# Patient Record
Sex: FEMALE | Race: WHITE | NOT HISPANIC OR LATINO | Employment: OTHER | URBAN - METROPOLITAN AREA
[De-identification: names, ages, dates, MRNs, and addresses within clinical notes are randomized per-mention and may not be internally consistent; named-entity substitution may affect disease eponyms.]

---

## 2017-04-11 ENCOUNTER — OFFICE VISIT (OUTPATIENT)
Dept: RHEUMATOLOGY | Facility: PHYSICIAN GROUP | Age: 72
End: 2017-04-11
Payer: MEDICARE

## 2017-04-11 VITALS
SYSTOLIC BLOOD PRESSURE: 150 MMHG | DIASTOLIC BLOOD PRESSURE: 80 MMHG | TEMPERATURE: 98.8 F | OXYGEN SATURATION: 98 % | RESPIRATION RATE: 14 BRPM | BODY MASS INDEX: 25.22 KG/M2 | HEART RATE: 72 BPM | WEIGHT: 147 LBS

## 2017-04-11 DIAGNOSIS — M15.9 PRIMARY OSTEOARTHRITIS INVOLVING MULTIPLE JOINTS: ICD-10-CM

## 2017-04-11 DIAGNOSIS — Z96.643 HISTORY OF BILATERAL HIP REPLACEMENTS: ICD-10-CM

## 2017-04-11 DIAGNOSIS — E03.9 HYPOTHYROIDISM, UNSPECIFIED TYPE: ICD-10-CM

## 2017-04-11 DIAGNOSIS — M81.0 OSTEOPOROSIS, POST-MENOPAUSAL: ICD-10-CM

## 2017-04-11 PROCEDURE — 1036F TOBACCO NON-USER: CPT | Performed by: INTERNAL MEDICINE

## 2017-04-11 PROCEDURE — G8432 DEP SCR NOT DOC, RNG: HCPCS | Performed by: INTERNAL MEDICINE

## 2017-04-11 PROCEDURE — 99213 OFFICE O/P EST LOW 20 MIN: CPT | Performed by: INTERNAL MEDICINE

## 2017-04-11 PROCEDURE — 4040F PNEUMOC VAC/ADMIN/RCVD: CPT | Performed by: INTERNAL MEDICINE

## 2017-04-11 PROCEDURE — 1101F PT FALLS ASSESS-DOCD LE1/YR: CPT | Mod: 8P | Performed by: INTERNAL MEDICINE

## 2017-04-11 PROCEDURE — 3017F COLORECTAL CA SCREEN DOC REV: CPT | Mod: 8P | Performed by: INTERNAL MEDICINE

## 2017-04-11 PROCEDURE — 3014F SCREEN MAMMO DOC REV: CPT | Performed by: INTERNAL MEDICINE

## 2017-04-11 PROCEDURE — G8419 CALC BMI OUT NRM PARAM NOF/U: HCPCS | Performed by: INTERNAL MEDICINE

## 2017-04-11 RX ORDER — LISINOPRIL 20 MG/1
20 TABLET ORAL DAILY
COMMUNITY
End: 2023-04-06 | Stop reason: SDUPTHER

## 2017-04-11 NOTE — MR AVS SNAPSHOT
Sara Sandoval   2017 9:45 AM   Office Visit   MRN: [id number]    Department:  Rheumatology [facility]   Dept Phone:  [phone]    Description:  Female : 1945   Provider:  Kacey Leonard M.D.           Reason for Visit     Follow-Up           Allergies as of 2017     No Known Allergies      Vital Signs     Blood Pressure Pulse Temperature Respirations Weight Oxygen Saturation    150/80 mmHg 72 37.1 °C (98.8 °F) 14 66.679 kg (147 lb) 98%    Smoking Status                   Never Smoker            Basic Information     Date Of Birth Sex Race Ethnicity Preferred Language    1945 Female White Non- English      Your appointments     2018  9:45 AM   Follow Up Visit with Kacey Leonard M.D.   [facility] (--)    [address] [place] [address], [address]  [facility] [id number] [phone]           You will be receiving a confirmation call a few days before your appointment from our automated call confirmation system.              Problem List              ICD-10-CM Priority Class Noted - Resolved    Hypothyroid E03.9   2014 - Present    Vitamin D deficiency disease E55.9   2014 - Present    Osteoarthritis M19.90   2014 - Present    History of bilateral hip replacements Z96.643   2014 - Present    Post hysterectomy menopause E89.40, Z90.710   2014 - Present    Osteoporosis, post-menopausal M81.0   2014 - Present      Health Maintenance        Date Due Completion Dates    IMM DTaP/Tdap/Td Vaccine (1 - Tdap) 1962 ---    PAP SMEAR 1964 ---    COLONOSCOPY 1993 ---    IMM PNEUMOCOCCAL 65+ (ADULT) LOW/MEDIUM RISK SERIES (2 of 2 - PCV13) 11/10/2009 11/10/2008    MAMMOGRAM 6/10/2017 6/10/2016, 2015, 2014, 3/22/2013, 3/15/2012, 3/3/2011, 2007, 2007, 2007    BONE DENSITY 3/11/2021 3/11/2016            Current Immunizations     Influenza TIV (IM) 11/10/2012    Influenza Vaccine Adult HD 10/25/2016 10:13 AM,  10/28/2014    Pneumococcal polysaccharide vaccine (PPSV-23) 11/10/2008    SHINGLES VACCINE 7/10/2008      Below and/or attached are the medications your provider expects you to take. Review all of your home medications and newly ordered medications with your provider and/or pharmacist. Follow medication instructions as directed by your provider and/or pharmacist. Please keep your medication list with you and share with your provider. Update the information when medications are discontinued, doses are changed, or new medications (including over-the-counter products) are added; and carry medication information at all times in the event of emergency situations     Allergies:  No Known Allergies          Medications  Valid as of: April 11, 2017 - 10:16 AM    Generic Name Brand Name Tablet Size Instructions for use    Aspirin (Chew Tab) ASA 81 MG Take 81 mg by mouth every day.          Calcium-Magnesium-Vitamin D   Take 1,000 mg by mouth every day.        Cholecalciferol (Tab) Vitamin D3 2000 UNITS Take  by mouth every day.        Ibuprofen (Tab) MOTRIN 800 MG Take 800 mg by mouth as needed.        Levothyroxine Sodium (Tab) SYNTHROID 50 MCG Take 50 mcg by mouth every day.        Lisinopril (Tab) PRINIVIL 20 MG Take 20 mg by mouth every day.        Lutein (Tab) Lutein 20 MG Take  by mouth every day.        Multiple Vitamins-Minerals   Take  by mouth every day.        Psyllium (Pack) METAMUCIL 58.12 % Take 1 Packet by mouth every day.        .                 Medicines prescribed today were sent to:     [id number] - [place], [place] - [address] [place] [id number]    Phone: [phone] Fax: [phone]    Open 24 Hours?: No      Medication refill instructions:       If your prescription bottle indicates you have medication refills left, it is not necessary to call your provider’s office. Please contact your pharmacy and they will refill your medication.    If your prescription bottle  indicates you do not have any refills left, you may request refills at any time through one of the following ways: The online Serviceful system (except Urgent Care), by calling your provider’s office, or by asking your pharmacy to contact your provider’s office with a refill request. Medication refills are processed only during regular business hours and may not be available until the next business day. Your provider may request additional information or to have a follow-up visit with you prior to refilling your medication.   *Please Note: Medication refills are assigned a new Rx number when refilled electronically. Your pharmacy may indicate that no refills were authorized even though a new prescription for the same medication is available at the pharmacy. Please request the medicine by name with the pharmacy before contacting your provider for a refill.           Serviceful Access Code: Activation code not generated  Current Serviceful Status: Active

## 2017-04-11 NOTE — Clinical Note
[facility]   [address], [address]  [place] [address]  Phone: [phone]  Fax: [phone]              Encounter Date: 4/11/2017    Dear Dr. Guajardo ref. provider found,    It was a pleasure seeing your patient, Sara Sandoval, on 4/11/2017. Diagnoses of Osteoporosis, post-menopausal, Primary osteoarthritis involving multiple joints, History of bilateral hip replacements, and Hypothyroidism, unspecified type were pertinent to this visit.     Please find attached progress note which includes the history I obtained from Ms. Sandoval, my physical examination findings, my impression and recommendations.      Once again, it was a pleasure participating in your patient's care.  Please feel free to contact me if you have any questions or if I can be of any further assistance to your patients.      Sincerely,    Kacey Leonard M.D.  Electronically Signed          PROGRESS NOTE:  No notes on file

## 2017-04-11 NOTE — PROGRESS NOTES
Chief Complaint- joint pain    Subjective:   Sara Sandoval is a 71 y.o. female here today for follow up of rheumatological issues    This is a follow-up visit for this patient who had been previously followed by Dr Felix, patient has had problems with osteoporosis in the past, l  At last visit, we scheduled patient to get a bone density scan to evaluate if patient needs to go on bisphosphonates for alternative such as Prolia or Forteo, patient here to follow DEXA scan which shows T-scores of -2.0 and -1.9, much improvement since previous DEXA scan. Of note patient status post bilateral hip replacements and was told by her orthopedic surgeon that it was imperative that she keep her bones strong so as to maintain her bilateral hip replacements. Patient doesn't really have much complaints, is doing exercise programs to maintain her strength, has a lot of friends who are having chills but patient's doing really quite well. Patient continues to take calcium 1200 mg by mouth daily with magnesium 400 mg by mouth daily and vitamin D about 2000 units a day.    Bilateral CHLOE    S/p fosamax [id number]  S/p actonel until 2010    DEXA 1/2014 T scores -1.5, -3.0  DEXA 1/2016 T scores -2.0, -1.9        Current medicines (including changes today)  Current Outpatient Prescriptions   Medication Sig Dispense Refill   • lisinopril (PRINIVIL) 20 MG Tab Take 20 mg by mouth every day.     • psyllium (METAMUCIL) 58.12 % PACK Take 1 Packet by mouth every day.     • levothyroxine (SYNTHROID) 50 MCG TABS Take 50 mcg by mouth every day.     • Cholecalciferol (VITAMIN D3) 2000 UNIT TABS Take  by mouth every day.     • Calcium-Magnesium-Vitamin D (CITRACAL CALCIUM+D PO) Take 1,000 mg by mouth every day.     • Multiple Vitamin (MULTIVITAMIN PO) Take  by mouth every day.     • Lutein 20 MG TABS Take  by mouth every day.     • ibuprofen (MOTRIN) 800 MG TABS Take 800 mg by mouth as needed.     • aspirin (ASA) 81 MG CHEW Take 81 mg by mouth every  day.         No current facility-administered medications for this visit.     She  has a past medical history of Arthritis; Shingles (2010); Unspecified disorder of thyroid; and Pain (6/11/12).    ROS   Other than what is mentioned in HPI or physical exam, there is no history of headaches, double vision or blurred vision. No temporal tenderness or jaw claudication. No history of cataracts or glaucoma. No trouble swallowing difficulties or sore throats. No history of thyroid disease. No chest complaints including chest pain, cough or sputum production. No shortness of breath. No GI complaints including nausea, vomiting, change in bowel habits, or past peptic ulcer disease. No history of blood in the stools. No urinary complaints including dysuria or frequency. No history of rash including psoriasis. No history of alopecia, photosensitivity, oral ulcerations, Raynaud's phenomena, or swollen joints. No history of gout. No back complaints. No history of low blood counts.       Objective:     Blood pressure 150/80, pulse 72, temperature 37.1 °C (98.8 °F), resp. rate 14, weight 66.679 kg (147 lb), SpO2 98 %. Body mass index is 25.22 kg/(m^2).   Physical Exam:  GENERAL: Alert and oriented, No apparent distress. HEENT: Unremarkable. NECK: Supple. No Jugular venous distension, lymphadenopathy, or thyromegaly. No Bruits appreciated. Good range of motion. THROAT: Clear.LYMPH: No supraclavicular lymphadenopathy, no cervical lymphadenopathy, and no axillary lymphadenopathy LUNGS: Clear to auscultation and palpation. COR: Regular rate and rhythm. No murmurs, rubs or gallops appreciated.  ABDOMEN: Soft non tender, non distended. Normal active bowel sounds. No organomegaly. No masses appreciated. EXTREMITIES: No clubbing, cyanosis, edema. : Not done. RECTAL. Not done.MUSCULOSKELETAL: The neck has a full range of motion. Shoulders elbows wrists and small joints of the hands are unremarkable other than evidence of mild Heberden's  nodes on the DIP joints of both hands. No evidence of dowager's hump No tophi or nodules. Hips, knees, ankles and feet are unremarkable. The low back is unremarkable. There is no tenderness to compression or palpation. Flexion is good. Well-healed bilateral, gait without antalgia and without foot drop NEUROLOGICAL:Intact. PULSES: Intact.    Lab Results   Component Value Date/Time    SODIUM 139 10/05/2016 08:17 AM    POTASSIUM 5.1 10/05/2016 08:17 AM    CHLORIDE 108 10/05/2016 08:17 AM    CO2 28 10/05/2016 08:17 AM    GLUCOSE 86 10/05/2016 08:17 AM    BUN 12 10/05/2016 08:17 AM    CREATININE 0.82 10/05/2016 08:17 AM      Lab Results   Component Value Date/Time    WBC 7.9 12/11/2012 05:53 AM    RBC 3.27* 12/11/2012 05:53 AM    HEMOGLOBIN 10.4* 12/11/2012 05:53 AM    HEMATOCRIT 31.2* 12/11/2012 05:53 AM    MCV 95.4 12/11/2012 05:53 AM    MCH 31.8 12/11/2012 05:53 AM    MCHC 33.4 12/11/2012 05:53 AM    MPV 6.8 12/11/2012 05:53 AM      Lab Results   Component Value Date/Time    CALCIUM 9.3 10/05/2016 08:17 AM    AST(SGOT) 17 10/05/2016 08:17 AM    ALT(SGPT) 13 10/05/2016 08:17 AM    ALKALINE PHOSPHATASE 57 10/05/2016 08:17 AM    TOTAL BILIRUBIN 0.3 10/05/2016 08:17 AM    ALBUMIN 3.8 10/05/2016 08:17 AM    TOTAL PROTEIN 7.1 10/05/2016 08:17 AM     Lab Results   Component Value Date/Time    GLYCOHEMOGLOBIN 5.4 02/02/2016 11:15 AM     Results for orders placed during the hospital encounter of 03/11/16   DS-BONE DENSITY STUDY (DEXA)    Impression According to the World Health Organization classification, bone mineral density of this patient is osteopenic.        FRAX score not obtained for this patient.          INTERPRETING LOCATION:  [address] [place] [address], [id number]     Results for orders placed during the hospital encounter of 04/08/10   DX-KNEES-AP BILATERAL STANDING     Results for orders placed during the hospital encounter of 12/10/12   DX-PELVIS-1 OR 2 VIEWS    Impression Left hip arthroplasty appears within normal  limits     Results for orders placed during the hospital encounter of 04/08/10   DX-KNEES-AP BILATERAL STANDING     Results for orders placed during the hospital encounter of 12/10/12   DX-PELVIS-1 OR 2 VIEWS    Impression Left hip arthroplasty appears within normal limits     Results for orders placed in visit on 04/03/14   DX-ANKLE 3+ VIEWS    Impression Soft tissue swelling. No acute fracture identified.     Assessment and Plan:     1. Osteoporosis, post-menopausal  Next DEXA due about 1/2018, patient to follow-up about February 2018 where we can schedule the DEXA   continue calcium 1200 mg by mouth daily and vitamin D about 1000 units by mouth daily and magnesium 400 mg by mouth daily    2. Primary osteoarthritis involving multiple joints  Patient takes over-the-counter Aleve or Advil when necessary, did advise patient regarding tumeric 400-600 mg 3 times a day when necessary to help with inflammation as well    3. History of bilateral hip replacements  Stable    4. Hypothyroidism, unspecified type  Can exacerbate joint pain, I recommend monitoring thyroid on a regular basis to assure it is not contributing to the patient's joint pain    Followup: Return in about 1 year (around 4/11/2018). or sooner prn    Patient was seen 30 minutes face-to-face of which more than 50% of the time was spent counseling the patient (excluding time for procedures)  regarding  rheumatological condition and care. Therapy was discussed in detail.    Please note that this dictation was created using voice recognition software. I have made every reasonable attempt to correct obvious errors, but I expect that there are errors of grammar and possibly content that I did not discover before finalizing the note.

## 2017-07-05 ENCOUNTER — HOSPITAL ENCOUNTER (OUTPATIENT)
Dept: RADIOLOGY | Facility: MEDICAL CENTER | Age: 72
End: 2017-07-05
Attending: FAMILY MEDICINE
Payer: MEDICARE

## 2017-07-05 DIAGNOSIS — Z12.31 ENCOUNTER FOR MAMMOGRAM TO ESTABLISH BASELINE MAMMOGRAM: ICD-10-CM

## 2017-07-05 PROCEDURE — 77063 BREAST TOMOSYNTHESIS BI: CPT

## 2017-10-10 ENCOUNTER — HOSPITAL ENCOUNTER (OUTPATIENT)
Dept: LAB | Facility: MEDICAL CENTER | Age: 72
End: 2017-10-10
Attending: NURSE PRACTITIONER
Payer: MEDICARE

## 2017-10-10 LAB
ALBUMIN SERPL BCP-MCNC: 3.9 G/DL (ref 3.2–4.9)
ALBUMIN/GLOB SERPL: 1.3 G/DL
ALP SERPL-CCNC: 53 U/L (ref 30–99)
ALT SERPL-CCNC: 13 U/L (ref 2–50)
ANION GAP SERPL CALC-SCNC: 6 MMOL/L (ref 0–11.9)
AST SERPL-CCNC: 17 U/L (ref 12–45)
BASOPHILS # BLD AUTO: 0.7 % (ref 0–1.8)
BASOPHILS # BLD: 0.04 K/UL (ref 0–0.12)
BILIRUB SERPL-MCNC: 0.4 MG/DL (ref 0.1–1.5)
BUN SERPL-MCNC: 17 MG/DL (ref 8–22)
CALCIUM SERPL-MCNC: 9.4 MG/DL (ref 8.5–10.5)
CHLORIDE SERPL-SCNC: 105 MMOL/L (ref 96–112)
CHOLEST SERPL-MCNC: 205 MG/DL (ref 100–199)
CO2 SERPL-SCNC: 26 MMOL/L (ref 20–33)
CREAT SERPL-MCNC: 1 MG/DL (ref 0.5–1.4)
EOSINOPHIL # BLD AUTO: 0.17 K/UL (ref 0–0.51)
EOSINOPHIL NFR BLD: 3 % (ref 0–6.9)
ERYTHROCYTE [DISTWIDTH] IN BLOOD BY AUTOMATED COUNT: 47.8 FL (ref 35.9–50)
EST. AVERAGE GLUCOSE BLD GHB EST-MCNC: 120 MG/DL
GFR SERPL CREATININE-BSD FRML MDRD: 54 ML/MIN/1.73 M 2
GLOBULIN SER CALC-MCNC: 3 G/DL (ref 1.9–3.5)
GLUCOSE SERPL-MCNC: 83 MG/DL (ref 65–99)
HBA1C MFR BLD: 5.8 % (ref 0–5.6)
HCT VFR BLD AUTO: 39.4 % (ref 37–47)
HCV AB SER QL: NEGATIVE
HDLC SERPL-MCNC: 56 MG/DL
HGB BLD-MCNC: 12.6 G/DL (ref 12–16)
IMM GRANULOCYTES # BLD AUTO: 0.01 K/UL (ref 0–0.11)
IMM GRANULOCYTES NFR BLD AUTO: 0.2 % (ref 0–0.9)
LDLC SERPL CALC-MCNC: 111 MG/DL
LYMPHOCYTES # BLD AUTO: 2.36 K/UL (ref 1–4.8)
LYMPHOCYTES NFR BLD: 41.6 % (ref 22–41)
MCH RBC QN AUTO: 30.9 PG (ref 27–33)
MCHC RBC AUTO-ENTMCNC: 32 G/DL (ref 33.6–35)
MCV RBC AUTO: 96.6 FL (ref 81.4–97.8)
MONOCYTES # BLD AUTO: 0.43 K/UL (ref 0–0.85)
MONOCYTES NFR BLD AUTO: 7.6 % (ref 0–13.4)
NEUTROPHILS # BLD AUTO: 2.66 K/UL (ref 2–7.15)
NEUTROPHILS NFR BLD: 46.9 % (ref 44–72)
NRBC # BLD AUTO: 0 K/UL
NRBC BLD AUTO-RTO: 0 /100 WBC
PLATELET # BLD AUTO: 288 K/UL (ref 164–446)
PMV BLD AUTO: 9.5 FL (ref 9–12.9)
POTASSIUM SERPL-SCNC: 4.7 MMOL/L (ref 3.6–5.5)
PROT SERPL-MCNC: 6.9 G/DL (ref 6–8.2)
RBC # BLD AUTO: 4.08 M/UL (ref 4.2–5.4)
SODIUM SERPL-SCNC: 137 MMOL/L (ref 135–145)
T4 FREE SERPL-MCNC: 0.94 NG/DL (ref 0.53–1.43)
TRIGL SERPL-MCNC: 192 MG/DL (ref 0–149)
TSH SERPL DL<=0.005 MIU/L-ACNC: 4.59 UIU/ML (ref 0.3–3.7)
WBC # BLD AUTO: 5.7 K/UL (ref 4.8–10.8)

## 2017-10-10 PROCEDURE — 85025 COMPLETE CBC W/AUTO DIFF WBC: CPT

## 2017-10-10 PROCEDURE — 36415 COLL VENOUS BLD VENIPUNCTURE: CPT

## 2017-10-10 PROCEDURE — 84443 ASSAY THYROID STIM HORMONE: CPT

## 2017-10-10 PROCEDURE — 80061 LIPID PANEL: CPT

## 2017-10-10 PROCEDURE — 86803 HEPATITIS C AB TEST: CPT

## 2017-10-10 PROCEDURE — 80053 COMPREHEN METABOLIC PANEL: CPT

## 2017-10-10 PROCEDURE — 83036 HEMOGLOBIN GLYCOSYLATED A1C: CPT | Mod: GA

## 2017-10-10 PROCEDURE — 84439 ASSAY OF FREE THYROXINE: CPT

## 2017-10-11 ENCOUNTER — HOSPITAL ENCOUNTER (OUTPATIENT)
Facility: MEDICAL CENTER | Age: 72
End: 2017-10-11
Attending: NURSE PRACTITIONER
Payer: MEDICARE

## 2017-10-11 LAB — HEMOCCULT STL QL: NEGATIVE

## 2017-10-11 PROCEDURE — 82272 OCCULT BLD FECES 1-3 TESTS: CPT | Mod: GA

## 2017-12-19 ENCOUNTER — HOSPITAL ENCOUNTER (OUTPATIENT)
Dept: LAB | Facility: MEDICAL CENTER | Age: 72
End: 2017-12-19
Attending: NURSE PRACTITIONER
Payer: MEDICARE

## 2017-12-19 ENCOUNTER — APPOINTMENT (OUTPATIENT)
Dept: LAB | Facility: MEDICAL CENTER | Age: 72
End: 2017-12-19
Payer: MEDICARE

## 2017-12-19 LAB
T4 FREE SERPL-MCNC: 1.12 NG/DL (ref 0.53–1.43)
TSH SERPL DL<=0.005 MIU/L-ACNC: 0.84 UIU/ML (ref 0.38–5.33)

## 2017-12-19 PROCEDURE — 84443 ASSAY THYROID STIM HORMONE: CPT

## 2017-12-19 PROCEDURE — 84439 ASSAY OF FREE THYROXINE: CPT

## 2017-12-19 PROCEDURE — 36415 COLL VENOUS BLD VENIPUNCTURE: CPT

## 2018-02-13 ENCOUNTER — OFFICE VISIT (OUTPATIENT)
Dept: RHEUMATOLOGY | Facility: PHYSICIAN GROUP | Age: 73
End: 2018-02-13
Payer: MEDICARE

## 2018-02-13 VITALS
OXYGEN SATURATION: 98 % | SYSTOLIC BLOOD PRESSURE: 140 MMHG | WEIGHT: 146 LBS | DIASTOLIC BLOOD PRESSURE: 78 MMHG | RESPIRATION RATE: 14 BRPM | HEART RATE: 68 BPM | TEMPERATURE: 98.6 F | BODY MASS INDEX: 25.06 KG/M2

## 2018-02-13 DIAGNOSIS — Z96.643 HISTORY OF BILATERAL HIP REPLACEMENTS: ICD-10-CM

## 2018-02-13 DIAGNOSIS — M15.9 PRIMARY OSTEOARTHRITIS INVOLVING MULTIPLE JOINTS: ICD-10-CM

## 2018-02-13 DIAGNOSIS — E03.9 HYPOTHYROIDISM, UNSPECIFIED TYPE: ICD-10-CM

## 2018-02-13 DIAGNOSIS — E55.9 VITAMIN D DEFICIENCY DISEASE: ICD-10-CM

## 2018-02-13 DIAGNOSIS — M81.0 OSTEOPOROSIS, POST-MENOPAUSAL: ICD-10-CM

## 2018-02-13 PROCEDURE — 99214 OFFICE O/P EST MOD 30 MIN: CPT | Performed by: INTERNAL MEDICINE

## 2018-02-13 NOTE — PROGRESS NOTES
Chief Complaint- joint pain    Subjective:   Sara Sandoval is a 72 y.o. female here today for follow up of rheumatological issues    This is a follow-up visit for this patient previously followed by Dr martinez who we follow for osteoporosis with recent bone density scan indicating osteopenia only. Patient's last DEXA scan almost 2 years ago, patient here to get a repeat bone density scan. Patient denies any fractures since last visit, continues to take calcium, vitamin D and magnesium. Also since last visit patient has been doing weightbearing exercises and walks every day.   Of note patient status post bilateral hip replacements and was told by her orthopedic surgeon that it was imperative that she keep her bones strong so as to maintain her bilateral hip replacements. Patient doesn't really have much complaints, is doing exercise programs to maintain her strength.     Bilateral CHLOE     S/p fosamax [id number]  S/p actonel until 2010     DEXA 1/2014 T scores -1.5, -3.0  DEXA 1/2016 T scores -2.0, -1.9      Current medicines (including changes today)  Current Outpatient Prescriptions   Medication Sig Dispense Refill   • lisinopril (PRINIVIL) 20 MG Tab Take 20 mg by mouth every day.     • psyllium (METAMUCIL) 58.12 % PACK Take 1 Packet by mouth every day.     • levothyroxine (SYNTHROID) 50 MCG TABS Take 50 mcg by mouth every day.     • Cholecalciferol (VITAMIN D3) 2000 UNIT TABS Take  by mouth every day.     • Calcium-Magnesium-Vitamin D (CITRACAL CALCIUM+D PO) Take 1,000 mg by mouth every day.     • Multiple Vitamin (MULTIVITAMIN PO) Take  by mouth every day.     • Lutein 20 MG TABS Take  by mouth every day.     • aspirin (ASA) 81 MG CHEW Take 81 mg by mouth every day.       • ibuprofen (MOTRIN) 800 MG TABS Take 800 mg by mouth as needed.       No current facility-administered medications for this visit.      She  has a past medical history of Arthritis; Pain (6/11/12); Shingles (2010); and Unspecified disorder of  thyroid.    ROS   Other than what is mentioned in HPI or physical exam, there is no history of headaches, double vision or blurred vision. No temporal tenderness or jaw claudication. No history of cataracts or glaucoma. No trouble swallowing difficulties or sore throats. No history of thyroid disease. No chest complaints including chest pain, cough or sputum production. No shortness of breath. No GI complaints including nausea, vomiting, change in bowel habits, or past peptic ulcer disease. No history of blood in the stools. No urinary complaints including dysuria or frequency. No history of rash including psoriasis. No history of alopecia, photosensitivity, oral ulcerations, Raynaud's phenomena, or swollen joints. No history of gout. No back complaints. No history of low blood counts.       Objective:     Blood pressure 140/78, pulse 68, temperature 37 °C (98.6 °F), resp. rate 14, weight 66.2 kg (146 lb), SpO2 98 %. Body mass index is 25.06 kg/m².   Physical Exam:  GENERAL: Alert and oriented, No apparent distress. HEENT: Unremarkable. NECK: Supple. No Jugular venous distension, lymphadenopathy, or thyromegaly. No bruits appreciated. Good range of motion. THROAT: Clear.LYMPH: No supraclavicular lymphadenopathy, no cervical lymphadenopathy, and no axillary lymphadenopathy LUNGS: Clear to auscultation and palpation. COR: Regular rate and rhythm. No murmurs, rubs or gallops appreciated.  ABDOMEN: Soft non tender, non distended. Normal active bowel sounds. No organomegaly. No masses appreciated. EXTREMITIES: No clubbing, cyanosis, edema. : Not done. RECTAL. Not done.MUSCULOSKELETAL: The neck has a full range of motion. Shoulders elbows wrists and small joints of the hands are unremarkable other than evidence of mild Heberden's nodes on the DIP joints of both hands. No evidence of dowager's hump No tophi or nodules. Hips, knees, ankles and feet are unremarkable. The low back is unremarkable. There is no tenderness to  compression or palpation. Flexion is good. Well-healed bilateral, gait without antalgia and without foot drop NEUROLOGICAL:Intact. PULSES: Intact.    Lab Results   Component Value Date/Time    HEPCAB Negative 10/10/2017 07:06 AM     Lab Results   Component Value Date/Time    SODIUM 137 10/10/2017 07:06 AM    POTASSIUM 4.7 10/10/2017 07:06 AM    CHLORIDE 105 10/10/2017 07:06 AM    CO2 26 10/10/2017 07:06 AM    GLUCOSE 83 10/10/2017 07:06 AM    BUN 17 10/10/2017 07:06 AM    CREATININE 1.00 10/10/2017 07:06 AM      Lab Results   Component Value Date/Time    WBC 5.7 10/10/2017 07:06 AM    RBC 4.08 (L) 10/10/2017 07:06 AM    HEMOGLOBIN 12.6 10/10/2017 07:06 AM    HEMATOCRIT 39.4 10/10/2017 07:06 AM    MCV 96.6 10/10/2017 07:06 AM    MCH 30.9 10/10/2017 07:06 AM    MCHC 32.0 (L) 10/10/2017 07:06 AM    MPV 9.5 10/10/2017 07:06 AM    NEUTSPOLYS 46.90 10/10/2017 07:06 AM    LYMPHOCYTES 41.60 (H) 10/10/2017 07:06 AM    MONOCYTES 7.60 10/10/2017 07:06 AM    EOSINOPHILS 3.00 10/10/2017 07:06 AM    BASOPHILS 0.70 10/10/2017 07:06 AM      Lab Results   Component Value Date/Time    CALCIUM 9.4 10/10/2017 07:06 AM    ASTSGOT 17 10/10/2017 07:06 AM    ALTSGPT 13 10/10/2017 07:06 AM    ALKPHOSPHAT 53 10/10/2017 07:06 AM    TBILIRUBIN 0.4 10/10/2017 07:06 AM    ALBUMIN 3.9 10/10/2017 07:06 AM    TOTPROTEIN 6.9 10/10/2017 07:06 AM     Lab Results   Component Value Date/Time    HBA1C 5.8 (H) 10/10/2017 07:06 AM     Results for orders placed during the hospital encounter of 03/11/16   DS-BONE DENSITY STUDY (DEXA)    Impression According to the World Health Organization classification, bone mineral density of this patient is osteopenic.        FRAX score not obtained for this patient.          INTERPRETING LOCATION:  [address] [place] [address] [place], [id number]     Results for orders placed during the hospital encounter of 04/08/10   DX-KNEES-AP BILATERAL STANDING     Results for orders placed during the hospital encounter of 12/10/12   DX-PELVIS-1  OR 2 VIEWS    Impression Left hip arthroplasty appears within normal limits     Results for orders placed during the hospital encounter of 04/08/10   DX-KNEES-AP BILATERAL STANDING     Results for orders placed during the hospital encounter of 12/10/12   DX-PELVIS-1 OR 2 VIEWS    Impression Left hip arthroplasty appears within normal limits     Results for orders placed in visit on 04/03/14   DX-ANKLE 3+ VIEWS    Impression Soft tissue swelling. No acute fracture identified.     Assessment and Plan:     1. Osteoporosis, post-menopausal  Last DEXA March 11, 2016 will schedule DEXA for evaluation of progression of osteopenia and osteoporosis  - DS-BONE DENSITY STUDY (DEXA); Future    2. Primary osteoarthritis involving multiple joints  Patient takes NSAIDs when necessary, discussed with patient the use of tumeric 400-6 and milligrams 3 times a day when necessary  - DS-BONE DENSITY STUDY (DEXA); Future    3. History of bilateral hip replacements  Stable, without a problem    4. Hypothyroidism, unspecified type  Can exacerbate joint pain, I recommend monitoring thyroid on a regular basis to assure it is not contributing to the patient's joint pain  - DS-BONE DENSITY STUDY (DEXA); Future    5. Vitamin D deficiency disease  If upcoming bone density scan indicates osteoporosis and multiple evaluate for vitamin D levels    Followup: Return in about 1 year (around 2/13/2019). or sooner prn    Patient was seen 30 minutes face-to-face of which more than 50% of the time was spent counseling the patient (excluding time for procedures)  regarding  rheumatological condition and care. Therapy was discussed in detail.    Please note that this dictation was created using voice recognition software. I have made every reasonable attempt to correct obvious errors, but I expect that there are errors of grammar and possibly content that I did not discover before finalizing the note.

## 2018-02-13 NOTE — LETTER
[facility]   [address], [address]  [place] [address]  Phone: [phone]  Fax: [phone]              Encounter Date: 2/13/2018    Dear Dr. Guajardo ref. provider found,    It was a pleasure seeing your patient, Sara Sandoval, on 2/13/2018. Diagnoses of Osteoporosis, post-menopausal, Primary osteoarthritis involving multiple joints, History of bilateral hip replacements, Hypothyroidism, unspecified type, and Vitamin D deficiency disease were pertinent to this visit.     Please find attached progress note which includes the history I obtained from Ms. Sandoval, my physical examination findings, my impression and recommendations.      Once again, it was a pleasure participating in your patient's care.  Please feel free to contact me if you have any questions or if I can be of any further assistance to your patients.      Sincerely,    Kacey Leonard M.D.  Electronically Signed          PROGRESS NOTE:  No notes on file

## 2018-03-20 ENCOUNTER — HOSPITAL ENCOUNTER (OUTPATIENT)
Dept: RADIOLOGY | Facility: MEDICAL CENTER | Age: 73
End: 2018-03-20
Attending: INTERNAL MEDICINE
Payer: MEDICARE

## 2018-03-20 DIAGNOSIS — E03.9 HYPOTHYROIDISM, UNSPECIFIED TYPE: ICD-10-CM

## 2018-03-20 DIAGNOSIS — M15.9 PRIMARY OSTEOARTHRITIS INVOLVING MULTIPLE JOINTS: ICD-10-CM

## 2018-03-20 DIAGNOSIS — M81.0 OSTEOPOROSIS, POST-MENOPAUSAL: ICD-10-CM

## 2018-03-20 PROCEDURE — 77080 DXA BONE DENSITY AXIAL: CPT

## 2018-03-23 ENCOUNTER — TELEPHONE (OUTPATIENT)
Dept: RHEUMATOLOGY | Facility: PHYSICIAN GROUP | Age: 73
End: 2018-03-23

## 2018-03-23 NOTE — TELEPHONE ENCOUNTER
Pt would like you to go over her Dexa Scan results with her, or just release them to [facility] for her to review.

## 2018-06-25 ENCOUNTER — APPOINTMENT (RX ONLY)
Dept: URBAN - METROPOLITAN AREA CLINIC 4 | Facility: CLINIC | Age: 73
Setting detail: DERMATOLOGY
End: 2018-06-25

## 2018-06-25 DIAGNOSIS — L70.8 OTHER ACNE: ICD-10-CM

## 2018-06-25 DIAGNOSIS — L57.0 ACTINIC KERATOSIS: ICD-10-CM

## 2018-06-25 DIAGNOSIS — D22 MELANOCYTIC NEVI: ICD-10-CM

## 2018-06-25 DIAGNOSIS — Z87.2 PERSONAL HISTORY OF DISEASES OF THE SKIN AND SUBCUTANEOUS TISSUE: ICD-10-CM

## 2018-06-25 DIAGNOSIS — Z71.89 OTHER SPECIFIED COUNSELING: ICD-10-CM

## 2018-06-25 DIAGNOSIS — B07.8 OTHER VIRAL WARTS: ICD-10-CM

## 2018-06-25 DIAGNOSIS — L82.1 OTHER SEBORRHEIC KERATOSIS: ICD-10-CM

## 2018-06-25 DIAGNOSIS — D18.0 HEMANGIOMA: ICD-10-CM

## 2018-06-25 DIAGNOSIS — L81.4 OTHER MELANIN HYPERPIGMENTATION: ICD-10-CM

## 2018-06-25 DIAGNOSIS — Z85.828 PERSONAL HISTORY OF OTHER MALIGNANT NEOPLASM OF SKIN: ICD-10-CM

## 2018-06-25 PROBLEM — D22.39 MELANOCYTIC NEVI OF OTHER PARTS OF FACE: Status: ACTIVE | Noted: 2018-06-25

## 2018-06-25 PROBLEM — D18.01 HEMANGIOMA OF SKIN AND SUBCUTANEOUS TISSUE: Status: ACTIVE | Noted: 2018-06-25

## 2018-06-25 PROBLEM — D22.62 MELANOCYTIC NEVI OF LEFT UPPER LIMB, INCLUDING SHOULDER: Status: ACTIVE | Noted: 2018-06-25

## 2018-06-25 PROBLEM — D22.71 MELANOCYTIC NEVI OF RIGHT LOWER LIMB, INCLUDING HIP: Status: ACTIVE | Noted: 2018-06-25

## 2018-06-25 PROBLEM — D22.61 MELANOCYTIC NEVI OF RIGHT UPPER LIMB, INCLUDING SHOULDER: Status: ACTIVE | Noted: 2018-06-25

## 2018-06-25 PROBLEM — D22.5 MELANOCYTIC NEVI OF TRUNK: Status: ACTIVE | Noted: 2018-06-25

## 2018-06-25 PROBLEM — D22.72 MELANOCYTIC NEVI OF LEFT LOWER LIMB, INCLUDING HIP: Status: ACTIVE | Noted: 2018-06-25

## 2018-06-25 PROCEDURE — ? LIQUID NITROGEN

## 2018-06-25 PROCEDURE — 99203 OFFICE O/P NEW LOW 30 MIN: CPT | Mod: 25

## 2018-06-25 PROCEDURE — ? COUNSELING

## 2018-06-25 PROCEDURE — 17000 DESTRUCT PREMALG LESION: CPT

## 2018-06-25 PROCEDURE — 17003 DESTRUCT PREMALG LES 2-14: CPT

## 2018-06-25 ASSESSMENT — LOCATION DETAILED DESCRIPTION DERM
LOCATION DETAILED: RIGHT PROXIMAL POSTERIOR THIGH
LOCATION DETAILED: LEFT PROXIMAL DORSAL FOREARM
LOCATION DETAILED: RIGHT RADIAL DORSAL HAND
LOCATION DETAILED: LEFT VENTRAL PROXIMAL FOREARM
LOCATION DETAILED: RIGHT DISTAL CALF
LOCATION DETAILED: LEFT ANTERIOR PROXIMAL UPPER ARM
LOCATION DETAILED: LEFT VENTRAL DISTAL FOREARM
LOCATION DETAILED: LEFT DISTAL POSTERIOR THIGH
LOCATION DETAILED: RIGHT PROXIMAL POSTERIOR UPPER ARM
LOCATION DETAILED: LOWER STERNUM
LOCATION DETAILED: LEFT DISTAL POSTERIOR UPPER ARM
LOCATION DETAILED: INFERIOR THORACIC SPINE
LOCATION DETAILED: MIDDLE STERNUM
LOCATION DETAILED: LEFT PROXIMAL CALF
LOCATION DETAILED: LEFT INFERIOR MEDIAL FOREHEAD
LOCATION DETAILED: RIGHT ANTERIOR DISTAL UPPER ARM
LOCATION DETAILED: LEFT ANTERIOR DISTAL UPPER ARM
LOCATION DETAILED: SUBXIPHOID
LOCATION DETAILED: LEFT ANTERIOR PROXIMAL THIGH
LOCATION DETAILED: RIGHT PROXIMAL DORSAL FOREARM
LOCATION DETAILED: RIGHT VENTRAL DISTAL FOREARM
LOCATION DETAILED: RIGHT ANTERIOR PROXIMAL UPPER ARM
LOCATION DETAILED: RIGHT ANTERIOR DISTAL THIGH
LOCATION DETAILED: RIGHT PROXIMAL PRETIBIAL REGION
LOCATION DETAILED: LEFT PROXIMAL POSTERIOR THIGH
LOCATION DETAILED: LEFT PROXIMAL POSTERIOR UPPER ARM
LOCATION DETAILED: SUPERIOR THORACIC SPINE
LOCATION DETAILED: RIGHT DISTAL POSTERIOR THIGH
LOCATION DETAILED: RIGHT POPLITEAL SKIN
LOCATION DETAILED: LEFT RADIAL DORSAL HAND
LOCATION DETAILED: RIGHT DISTAL DORSAL FOREARM
LOCATION DETAILED: LEFT SUPERIOR MEDIAL UPPER BACK
LOCATION DETAILED: RIGHT PROXIMAL CALF
LOCATION DETAILED: LEFT ANTERIOR DISTAL THIGH
LOCATION DETAILED: LEFT MEDIAL UPPER BACK
LOCATION DETAILED: STERNAL NOTCH
LOCATION DETAILED: LEFT PROXIMAL ULNAR THUMB
LOCATION DETAILED: LEFT MEDIAL CANTHUS
LOCATION DETAILED: LEFT MEDIAL FOREHEAD
LOCATION DETAILED: LEFT NASAL DORSUM
LOCATION DETAILED: EPIGASTRIC SKIN
LOCATION DETAILED: RIGHT DISTAL PRETIBIAL REGION
LOCATION DETAILED: LEFT DISTAL DORSAL FOREARM
LOCATION DETAILED: LEFT DISTAL CALF
LOCATION DETAILED: RIGHT VENTRAL PROXIMAL FOREARM
LOCATION DETAILED: LEFT PROXIMAL PRETIBIAL REGION
LOCATION DETAILED: RIGHT MEDIAL UPPER BACK
LOCATION DETAILED: RIGHT ANTERIOR PROXIMAL THIGH
LOCATION DETAILED: LEFT INFERIOR LATERAL NECK

## 2018-06-25 ASSESSMENT — LOCATION SIMPLE DESCRIPTION DERM
LOCATION SIMPLE: RIGHT HAND
LOCATION SIMPLE: RIGHT POPLITEAL SKIN
LOCATION SIMPLE: LEFT FOREHEAD
LOCATION SIMPLE: LEFT EYELID
LOCATION SIMPLE: LEFT POSTERIOR THIGH
LOCATION SIMPLE: RIGHT PRETIBIAL REGION
LOCATION SIMPLE: LEFT UPPER ARM
LOCATION SIMPLE: NOSE
LOCATION SIMPLE: RIGHT THIGH
LOCATION SIMPLE: RIGHT FOREARM
LOCATION SIMPLE: UPPER BACK
LOCATION SIMPLE: RIGHT POSTERIOR THIGH
LOCATION SIMPLE: CHEST
LOCATION SIMPLE: RIGHT UPPER BACK
LOCATION SIMPLE: LEFT FOREARM
LOCATION SIMPLE: LEFT CALF
LOCATION SIMPLE: LEFT THIGH
LOCATION SIMPLE: RIGHT CALF
LOCATION SIMPLE: LEFT UPPER BACK
LOCATION SIMPLE: LEFT PRETIBIAL REGION
LOCATION SIMPLE: RIGHT UPPER ARM
LOCATION SIMPLE: ABDOMEN
LOCATION SIMPLE: LEFT THUMB
LOCATION SIMPLE: LEFT POSTERIOR UPPER ARM
LOCATION SIMPLE: LEFT ANTERIOR NECK
LOCATION SIMPLE: RIGHT POSTERIOR UPPER ARM
LOCATION SIMPLE: LEFT HAND

## 2018-06-25 ASSESSMENT — LOCATION ZONE DERM
LOCATION ZONE: LEG
LOCATION ZONE: FINGER
LOCATION ZONE: FACE
LOCATION ZONE: TRUNK
LOCATION ZONE: ARM
LOCATION ZONE: NECK
LOCATION ZONE: EYELID
LOCATION ZONE: NOSE
LOCATION ZONE: HAND

## 2018-07-16 ENCOUNTER — HOSPITAL ENCOUNTER (OUTPATIENT)
Dept: LAB | Facility: MEDICAL CENTER | Age: 73
End: 2018-07-16
Attending: NURSE PRACTITIONER
Payer: MEDICARE

## 2018-07-16 LAB
BASOPHILS # BLD AUTO: 0.7 % (ref 0–1.8)
BASOPHILS # BLD: 0.04 K/UL (ref 0–0.12)
EOSINOPHIL # BLD AUTO: 0.12 K/UL (ref 0–0.51)
EOSINOPHIL NFR BLD: 2.2 % (ref 0–6.9)
ERYTHROCYTE [DISTWIDTH] IN BLOOD BY AUTOMATED COUNT: 47 FL (ref 35.9–50)
EST. AVERAGE GLUCOSE BLD GHB EST-MCNC: 128 MG/DL
HBA1C MFR BLD: 6.1 % (ref 0–5.6)
HCT VFR BLD AUTO: 39.4 % (ref 37–47)
HGB BLD-MCNC: 12.8 G/DL (ref 12–16)
IMM GRANULOCYTES # BLD AUTO: 0.01 K/UL (ref 0–0.11)
IMM GRANULOCYTES NFR BLD AUTO: 0.2 % (ref 0–0.9)
LYMPHOCYTES # BLD AUTO: 2.12 K/UL (ref 1–4.8)
LYMPHOCYTES NFR BLD: 39.4 % (ref 22–41)
MCH RBC QN AUTO: 31 PG (ref 27–33)
MCHC RBC AUTO-ENTMCNC: 32.5 G/DL (ref 33.6–35)
MCV RBC AUTO: 95.4 FL (ref 81.4–97.8)
MONOCYTES # BLD AUTO: 0.42 K/UL (ref 0–0.85)
MONOCYTES NFR BLD AUTO: 7.8 % (ref 0–13.4)
NEUTROPHILS # BLD AUTO: 2.67 K/UL (ref 2–7.15)
NEUTROPHILS NFR BLD: 49.7 % (ref 44–72)
NRBC # BLD AUTO: 0 K/UL
NRBC BLD-RTO: 0 /100 WBC
PLATELET # BLD AUTO: 279 K/UL (ref 164–446)
PMV BLD AUTO: 9.8 FL (ref 9–12.9)
RBC # BLD AUTO: 4.13 M/UL (ref 4.2–5.4)
T4 FREE SERPL-MCNC: 1.2 NG/DL (ref 0.53–1.43)
TSH SERPL DL<=0.005 MIU/L-ACNC: 0.66 UIU/ML (ref 0.38–5.33)
WBC # BLD AUTO: 5.4 K/UL (ref 4.8–10.8)

## 2018-07-16 PROCEDURE — 83036 HEMOGLOBIN GLYCOSYLATED A1C: CPT | Mod: GZ

## 2018-07-16 PROCEDURE — 84439 ASSAY OF FREE THYROXINE: CPT

## 2018-07-16 PROCEDURE — 80061 LIPID PANEL: CPT

## 2018-07-16 PROCEDURE — 84443 ASSAY THYROID STIM HORMONE: CPT

## 2018-07-16 PROCEDURE — 36415 COLL VENOUS BLD VENIPUNCTURE: CPT

## 2018-07-16 PROCEDURE — 85025 COMPLETE CBC W/AUTO DIFF WBC: CPT

## 2018-07-16 PROCEDURE — 80053 COMPREHEN METABOLIC PANEL: CPT

## 2018-07-17 LAB
ALBUMIN SERPL BCP-MCNC: 3.9 G/DL (ref 3.2–4.9)
ALBUMIN/GLOB SERPL: 1.3 G/DL
ALP SERPL-CCNC: 57 U/L (ref 30–99)
ALT SERPL-CCNC: 12 U/L (ref 2–50)
ANION GAP SERPL CALC-SCNC: 7 MMOL/L (ref 0–11.9)
AST SERPL-CCNC: 15 U/L (ref 12–45)
BILIRUB SERPL-MCNC: 0.5 MG/DL (ref 0.1–1.5)
BUN SERPL-MCNC: 19 MG/DL (ref 8–22)
CALCIUM SERPL-MCNC: 9.7 MG/DL (ref 8.5–10.5)
CHLORIDE SERPL-SCNC: 105 MMOL/L (ref 96–112)
CHOLEST SERPL-MCNC: 187 MG/DL (ref 100–199)
CO2 SERPL-SCNC: 28 MMOL/L (ref 20–33)
CREAT SERPL-MCNC: 1 MG/DL (ref 0.5–1.4)
GLOBULIN SER CALC-MCNC: 2.9 G/DL (ref 1.9–3.5)
GLUCOSE SERPL-MCNC: 84 MG/DL (ref 65–99)
HDLC SERPL-MCNC: 56 MG/DL
LDLC SERPL CALC-MCNC: 98 MG/DL
POTASSIUM SERPL-SCNC: 4.4 MMOL/L (ref 3.6–5.5)
PROT SERPL-MCNC: 6.8 G/DL (ref 6–8.2)
SODIUM SERPL-SCNC: 140 MMOL/L (ref 135–145)
TRIGL SERPL-MCNC: 165 MG/DL (ref 0–149)

## 2018-09-07 ENCOUNTER — HOSPITAL ENCOUNTER (OUTPATIENT)
Dept: LAB | Facility: MEDICAL CENTER | Age: 73
End: 2018-09-07
Attending: ORTHOPAEDIC SURGERY
Payer: MEDICARE

## 2018-09-07 LAB
CRP SERPL HS-MCNC: 0.12 MG/DL (ref 0–0.75)
ERYTHROCYTE [SEDIMENTATION RATE] IN BLOOD BY WESTERGREN METHOD: 16 MM/HOUR (ref 0–30)

## 2018-09-07 PROCEDURE — 86140 C-REACTIVE PROTEIN: CPT

## 2018-09-07 PROCEDURE — 36415 COLL VENOUS BLD VENIPUNCTURE: CPT

## 2018-09-07 PROCEDURE — 85652 RBC SED RATE AUTOMATED: CPT

## 2018-10-15 ENCOUNTER — HOSPITAL ENCOUNTER (OUTPATIENT)
Dept: RADIOLOGY | Facility: MEDICAL CENTER | Age: 73
End: 2018-10-15
Attending: FAMILY MEDICINE
Payer: MEDICARE

## 2018-10-15 DIAGNOSIS — Z12.31 VISIT FOR SCREENING MAMMOGRAM: ICD-10-CM

## 2018-10-15 PROCEDURE — 77067 SCR MAMMO BI INCL CAD: CPT

## 2018-10-30 ENCOUNTER — HOSPITAL ENCOUNTER (OUTPATIENT)
Dept: LAB | Facility: MEDICAL CENTER | Age: 73
End: 2018-10-30
Attending: NURSE PRACTITIONER
Payer: MEDICARE

## 2018-10-30 LAB
ALBUMIN SERPL BCP-MCNC: 4.1 G/DL (ref 3.2–4.9)
ALBUMIN/GLOB SERPL: 1.2 G/DL
ALP SERPL-CCNC: 53 U/L (ref 30–99)
ALT SERPL-CCNC: 15 U/L (ref 2–50)
ANION GAP SERPL CALC-SCNC: 7 MMOL/L (ref 0–11.9)
AST SERPL-CCNC: 16 U/L (ref 12–45)
BASOPHILS # BLD AUTO: 1.1 % (ref 0–1.8)
BASOPHILS # BLD: 0.05 K/UL (ref 0–0.12)
BILIRUB SERPL-MCNC: 0.5 MG/DL (ref 0.1–1.5)
BUN SERPL-MCNC: 21 MG/DL (ref 8–22)
CALCIUM SERPL-MCNC: 10.1 MG/DL (ref 8.5–10.5)
CHLORIDE SERPL-SCNC: 106 MMOL/L (ref 96–112)
CHOLEST SERPL-MCNC: 204 MG/DL (ref 100–199)
CO2 SERPL-SCNC: 26 MMOL/L (ref 20–33)
CREAT SERPL-MCNC: 0.92 MG/DL (ref 0.5–1.4)
EOSINOPHIL # BLD AUTO: 0.14 K/UL (ref 0–0.51)
EOSINOPHIL NFR BLD: 2.9 % (ref 0–6.9)
ERYTHROCYTE [DISTWIDTH] IN BLOOD BY AUTOMATED COUNT: 49.3 FL (ref 35.9–50)
EST. AVERAGE GLUCOSE BLD GHB EST-MCNC: 131 MG/DL
FASTING STATUS PATIENT QL REPORTED: NORMAL
GLOBULIN SER CALC-MCNC: 3.3 G/DL (ref 1.9–3.5)
GLUCOSE SERPL-MCNC: 90 MG/DL (ref 65–99)
HBA1C MFR BLD: 6.2 % (ref 0–5.6)
HCT VFR BLD AUTO: 40.9 % (ref 37–47)
HDLC SERPL-MCNC: 65 MG/DL
HGB BLD-MCNC: 12.7 G/DL (ref 12–16)
IMM GRANULOCYTES # BLD AUTO: 0.01 K/UL (ref 0–0.11)
IMM GRANULOCYTES NFR BLD AUTO: 0.2 % (ref 0–0.9)
LDLC SERPL CALC-MCNC: 119 MG/DL
LYMPHOCYTES # BLD AUTO: 2.05 K/UL (ref 1–4.8)
LYMPHOCYTES NFR BLD: 43.1 % (ref 22–41)
MCH RBC QN AUTO: 30.2 PG (ref 27–33)
MCHC RBC AUTO-ENTMCNC: 31.1 G/DL (ref 33.6–35)
MCV RBC AUTO: 97.4 FL (ref 81.4–97.8)
MONOCYTES # BLD AUTO: 0.36 K/UL (ref 0–0.85)
MONOCYTES NFR BLD AUTO: 7.6 % (ref 0–13.4)
NEUTROPHILS # BLD AUTO: 2.15 K/UL (ref 2–7.15)
NEUTROPHILS NFR BLD: 45.1 % (ref 44–72)
NRBC # BLD AUTO: 0 K/UL
NRBC BLD-RTO: 0 /100 WBC
PLATELET # BLD AUTO: 301 K/UL (ref 164–446)
PMV BLD AUTO: 9.5 FL (ref 9–12.9)
POTASSIUM SERPL-SCNC: 4.7 MMOL/L (ref 3.6–5.5)
PROT SERPL-MCNC: 7.4 G/DL (ref 6–8.2)
RBC # BLD AUTO: 4.2 M/UL (ref 4.2–5.4)
SODIUM SERPL-SCNC: 139 MMOL/L (ref 135–145)
T4 FREE SERPL-MCNC: 1.1 NG/DL (ref 0.53–1.43)
TRIGL SERPL-MCNC: 101 MG/DL (ref 0–149)
TSH SERPL DL<=0.005 MIU/L-ACNC: 0.72 UIU/ML (ref 0.38–5.33)
WBC # BLD AUTO: 4.8 K/UL (ref 4.8–10.8)

## 2018-10-30 PROCEDURE — 85025 COMPLETE CBC W/AUTO DIFF WBC: CPT

## 2018-10-30 PROCEDURE — 36415 COLL VENOUS BLD VENIPUNCTURE: CPT

## 2018-10-30 PROCEDURE — 84439 ASSAY OF FREE THYROXINE: CPT

## 2018-10-30 PROCEDURE — 80061 LIPID PANEL: CPT

## 2018-10-30 PROCEDURE — 83036 HEMOGLOBIN GLYCOSYLATED A1C: CPT | Mod: GA

## 2018-10-30 PROCEDURE — 80053 COMPREHEN METABOLIC PANEL: CPT

## 2018-10-30 PROCEDURE — 84443 ASSAY THYROID STIM HORMONE: CPT

## 2019-01-08 ENCOUNTER — APPOINTMENT (RX ONLY)
Dept: URBAN - METROPOLITAN AREA CLINIC 4 | Facility: CLINIC | Age: 74
Setting detail: DERMATOLOGY
End: 2019-01-08

## 2019-01-08 DIAGNOSIS — D22 MELANOCYTIC NEVI: ICD-10-CM

## 2019-01-08 DIAGNOSIS — Z71.89 OTHER SPECIFIED COUNSELING: ICD-10-CM

## 2019-01-08 DIAGNOSIS — L57.0 ACTINIC KERATOSIS: ICD-10-CM

## 2019-01-08 DIAGNOSIS — Z85.828 PERSONAL HISTORY OF OTHER MALIGNANT NEOPLASM OF SKIN: ICD-10-CM

## 2019-01-08 DIAGNOSIS — Z87.2 PERSONAL HISTORY OF DISEASES OF THE SKIN AND SUBCUTANEOUS TISSUE: ICD-10-CM

## 2019-01-08 DIAGNOSIS — L81.4 OTHER MELANIN HYPERPIGMENTATION: ICD-10-CM

## 2019-01-08 DIAGNOSIS — D18.0 HEMANGIOMA: ICD-10-CM

## 2019-01-08 DIAGNOSIS — L82.1 OTHER SEBORRHEIC KERATOSIS: ICD-10-CM

## 2019-01-08 DIAGNOSIS — L57.8 OTHER SKIN CHANGES DUE TO CHRONIC EXPOSURE TO NONIONIZING RADIATION: ICD-10-CM

## 2019-01-08 PROBLEM — D22.5 MELANOCYTIC NEVI OF TRUNK: Status: ACTIVE | Noted: 2019-01-08

## 2019-01-08 PROBLEM — D37.01 NEOPLASM OF UNCERTAIN BEHAVIOR OF LIP: Status: ACTIVE | Noted: 2019-01-08

## 2019-01-08 PROBLEM — D18.01 HEMANGIOMA OF SKIN AND SUBCUTANEOUS TISSUE: Status: ACTIVE | Noted: 2019-01-08

## 2019-01-08 PROCEDURE — 99213 OFFICE O/P EST LOW 20 MIN: CPT | Mod: 25

## 2019-01-08 PROCEDURE — ? COUNSELING

## 2019-01-08 PROCEDURE — 40490 BIOPSY OF LIP: CPT | Mod: 59

## 2019-01-08 PROCEDURE — ? LIQUID NITROGEN

## 2019-01-08 PROCEDURE — ? ADDITIONAL NOTES

## 2019-01-08 PROCEDURE — 17000 DESTRUCT PREMALG LESION: CPT

## 2019-01-08 PROCEDURE — ? BIOPSY BY SHAVE METHOD

## 2019-01-08 PROCEDURE — 17003 DESTRUCT PREMALG LES 2-14: CPT

## 2019-01-08 ASSESSMENT — LOCATION ZONE DERM
LOCATION ZONE: NECK
LOCATION ZONE: FACE
LOCATION ZONE: EYELID
LOCATION ZONE: TRUNK
LOCATION ZONE: ARM
LOCATION ZONE: HAND
LOCATION ZONE: LEG

## 2019-01-08 ASSESSMENT — LOCATION SIMPLE DESCRIPTION DERM
LOCATION SIMPLE: RIGHT HAND
LOCATION SIMPLE: ABDOMEN
LOCATION SIMPLE: RIGHT POPLITEAL SKIN
LOCATION SIMPLE: RIGHT LOWER BACK
LOCATION SIMPLE: RIGHT FOREARM
LOCATION SIMPLE: LEFT CHEEK
LOCATION SIMPLE: RIGHT CHEEK
LOCATION SIMPLE: RIGHT ANTERIOR NECK
LOCATION SIMPLE: CHEST
LOCATION SIMPLE: LEFT EYELID
LOCATION SIMPLE: LEFT HAND
LOCATION SIMPLE: LEFT LOWER BACK
LOCATION SIMPLE: LEFT FOREARM

## 2019-01-08 ASSESSMENT — LOCATION DETAILED DESCRIPTION DERM
LOCATION DETAILED: PERIUMBILICAL SKIN
LOCATION DETAILED: RIGHT SUPERIOR MEDIAL MIDBACK
LOCATION DETAILED: RIGHT POPLITEAL SKIN
LOCATION DETAILED: LEFT PROXIMAL DORSAL FOREARM
LOCATION DETAILED: RIGHT RADIAL DORSAL HAND
LOCATION DETAILED: RIGHT PROXIMAL DORSAL FOREARM
LOCATION DETAILED: LEFT SUPERIOR MEDIAL LOWER BACK
LOCATION DETAILED: LEFT RADIAL DORSAL HAND
LOCATION DETAILED: RIGHT INFERIOR LATERAL NECK
LOCATION DETAILED: LEFT DISTAL DORSAL FOREARM
LOCATION DETAILED: RIGHT SUPERIOR MEDIAL BUCCAL CHEEK
LOCATION DETAILED: STERNAL NOTCH
LOCATION DETAILED: LEFT MEDIAL CANTHUS
LOCATION DETAILED: LEFT SUPERIOR MEDIAL BUCCAL CHEEK

## 2019-01-08 NOTE — PROCEDURE: ADDITIONAL NOTES
Additional Notes: Includes spots of concern on intake; abdomen.
Detail Level: Simple
Additional Notes: Includes spots of concern on intake; lip.

## 2019-01-08 NOTE — PROCEDURE: BIOPSY BY SHAVE METHOD
Electrodesiccation Text: The wound bed was treated with electrodesiccation after the biopsy was performed.
Biopsy Type: H and E
Billing Type: Third-Party Bill
Type Of Destruction Used: Curettage
Bill 53249 For Specimen Handling/Conveyance To Laboratory?: no
Post-Care Instructions: I reviewed with the patient in detail post-care instructions. Patient is to keep the biopsy site dry overnight, and then apply bacitracin twice daily until healed. Patient may apply hydrogen peroxide soaks to remove any crusting.
Anesthesia Volume In Cc: 0
Was A Bandage Applied: Yes
Detail Level: Detailed
Biopsy Method: 15 blade
Electrodesiccation And Curettage Text: The wound bed was treated with electrodesiccation and curettage after the biopsy was performed.
Dressing: Band-Aid
Hemostasis: Aluminum Chloride and Electrocautery
Silver Nitrate Text: The wound bed was treated with silver nitrate after the biopsy was performed.
Size Of Lesion In Cm: 0.5
Notification Instructions: Patient will be notified of biopsy results. However, patient instructed to call the office if not contacted within 2 weeks.
Curettage Text: The wound bed was treated with curettage after the biopsy was performed.
Depth Of Biopsy: dermis
Lab Facility: 
Lab: [id number]
Wound Care: Aquaphor
Consent: Written consent was obtained and risks were reviewed including but not limited to scarring, infection, bleeding, scabbing, incomplete removal, nerve damage and allergy to anesthesia.
Cryotherapy Text: The wound bed was treated with cryotherapy after the biopsy was performed.
Anesthesia Type: 1% lidocaine with epinephrine

## 2019-02-12 ENCOUNTER — OFFICE VISIT (OUTPATIENT)
Dept: RHEUMATOLOGY | Facility: MEDICAL CENTER | Age: 74
End: 2019-02-12
Payer: MEDICARE

## 2019-02-12 VITALS
DIASTOLIC BLOOD PRESSURE: 82 MMHG | TEMPERATURE: 97.9 F | SYSTOLIC BLOOD PRESSURE: 150 MMHG | OXYGEN SATURATION: 98 % | BODY MASS INDEX: 24.72 KG/M2 | RESPIRATION RATE: 12 BRPM | HEART RATE: 72 BPM | WEIGHT: 144 LBS

## 2019-02-12 DIAGNOSIS — Z96.643 HISTORY OF BILATERAL HIP REPLACEMENTS: ICD-10-CM

## 2019-02-12 DIAGNOSIS — E55.9 VITAMIN D DEFICIENCY: ICD-10-CM

## 2019-02-12 DIAGNOSIS — E03.9 HYPOTHYROIDISM, UNSPECIFIED TYPE: ICD-10-CM

## 2019-02-12 DIAGNOSIS — R79.0 LOW MAGNESIUM LEVEL: ICD-10-CM

## 2019-02-12 DIAGNOSIS — M85.89 OSTEOPENIA OF MULTIPLE SITES: ICD-10-CM

## 2019-02-12 DIAGNOSIS — M15.9 PRIMARY OSTEOARTHRITIS INVOLVING MULTIPLE JOINTS: ICD-10-CM

## 2019-02-12 PROCEDURE — 99213 OFFICE O/P EST LOW 20 MIN: CPT | Performed by: INTERNAL MEDICINE

## 2019-02-12 NOTE — PROGRESS NOTES
Chief Complaint- joint pain    Subjective:   Sara Sandoval is a 73 y.o. female here today for follow up of rheumatological issues    This is a follow-up visit for this patient who was seen in this clinic for osteoporosis previously followed by Dr Felix, patient is currently only with osteopenia, had been on bisphosphonates in the past but bone density scans indicate osteopenia only.  Patient is actively working on maintaining integrity of her bone structure, patient does weightbearing exercises, tries to eat well and avoids any issues that may compromise the integrity of her bone density.      Of note patient status post bilateral hip replacements and was told by her orthopedic surgeon that it was imperative that she keep her bones strong so as to maintain her bilateral hip replacements. Patient doesn't really have much complaints, is doing exercise programs to maintain her strength.     Bilateral CHLOE     S/p fosamax [id number]  S/p actonel until 2010     DEXA 1/2014 T scores -1.5, -3.0  DEXA 1/2016 T scores -2.0, -1.9     DEXA 3/20/2018 T scores -2.2, -2.0    Current medicines (including changes today)  Current Outpatient Prescriptions   Medication Sig Dispense Refill   • lisinopril (PRINIVIL) 20 MG Tab Take 20 mg by mouth every day.     • psyllium (METAMUCIL) 58.12 % PACK Take 1 Packet by mouth every day.     • levothyroxine (SYNTHROID) 50 MCG TABS Take 75 mcg by mouth every day.     • Cholecalciferol (VITAMIN D3) 2000 UNIT TABS Take  by mouth every day.     • Calcium-Magnesium-Vitamin D (CITRACAL CALCIUM+D PO) Take 1,000 mg by mouth every day.     • Multiple Vitamin (MULTIVITAMIN PO) Take  by mouth every day.     • Lutein 20 MG TABS Take  by mouth every day.     • aspirin (ASA) 81 MG CHEW Take 81 mg by mouth every day.       • ibuprofen (MOTRIN) 800 MG TABS Take 800 mg by mouth as needed.       No current facility-administered medications for this visit.      She  has a past medical history of Arthritis;  Pain (6/11/12); Shingles (2010); and Unspecified disorder of thyroid.    ROS   Other than what is mentioned in HPI or physical exam, there is no history of headaches, double vision or blurred vision. No temporal tenderness or jaw claudication. No history of cataracts or glaucoma. No trouble swallowing difficulties or sore throats.  No chest complaints including chest pain, cough or sputum production. No GI complaints including nausea, vomiting, change in bowel habits, or past peptic ulcer disease. No history of blood in the stools. No urinary complaints including dysuria or frequency. No history of alopecia, photosensitivity, oral ulcerations, Raynaud's phenomena.       Objective:     Blood pressure 150/82, pulse 72, temperature 36.6 °C (97.9 °F), temperature source Temporal, resp. rate 12, weight 65.3 kg (144 lb), SpO2 98 %, not currently breastfeeding. Body mass index is 24.72 kg/m².   Physical Exam:    Constitutional: Alert and oriented X3, patient is talkative with good eye contact.Skin: Warm, dry, good turgor, no rashes in visible areas.Eye: Equal, round and reactive, conjunctiva clear, lids normal EOM intactENMT: Lips without lesions, good dentition, no oropharyngeal ulcers, moist buccal mucosa, pinna without deformityNeck: Trachea midline, no masses, no thyromegaly.Lymph:  No cervical lymphadenopathy, no axillary lymphadenopathy, no supraclavicular lymphadenopathyRespiratory: Unlabored respiratory effort, lungs clear to auscultation, no wheezes, no ronchi.Cardiovascular: Normal S1, S2, no murmur, no edema.Abdomen: Soft, non-tender, no masses, no hepatosplenomegaly.Psych: Alert and oriented x3, normal affect and mood.Neuro: Cranial nerves 2-12 are grossly intact, no loss of sensation LEExt:no joint laxity noted in bilateral arms, no joint laxity noted in bilateral legs, shoulders elbows wrists with good range of motion, small joints of the hands are unremarkable other than some mild Heberden's nodes on the DIP  joints, no dowagers hump no tophi no nodules, patient has a well-healed scar for bilateral THAs, gait without antalgia and without foot drop    Lab Results   Component Value Date/Time    HEPCAB Negative 10/10/2017 07:06 AM     Lab Results   Component Value Date/Time    SODIUM 139 10/30/2018 07:29 AM    POTASSIUM 4.7 10/30/2018 07:29 AM    CHLORIDE 106 10/30/2018 07:29 AM    CO2 26 10/30/2018 07:29 AM    GLUCOSE 90 10/30/2018 07:29 AM    BUN 21 10/30/2018 07:29 AM    CREATININE 0.92 10/30/2018 07:29 AM      Lab Results   Component Value Date/Time    WBC 4.8 10/30/2018 07:29 AM    RBC 4.20 10/30/2018 07:29 AM    HEMOGLOBIN 12.7 10/30/2018 07:29 AM    HEMATOCRIT 40.9 10/30/2018 07:29 AM    MCV 97.4 10/30/2018 07:29 AM    MCH 30.2 10/30/2018 07:29 AM    MCHC 31.1 (L) 10/30/2018 07:29 AM    MPV 9.5 10/30/2018 07:29 AM    NEUTSPOLYS 45.10 10/30/2018 07:29 AM    LYMPHOCYTES 43.10 (H) 10/30/2018 07:29 AM    MONOCYTES 7.60 10/30/2018 07:29 AM    EOSINOPHILS 2.90 10/30/2018 07:29 AM    BASOPHILS 1.10 10/30/2018 07:29 AM      Lab Results   Component Value Date/Time    CALCIUM 10.1 10/30/2018 07:29 AM    ASTSGOT 16 10/30/2018 07:29 AM    ALTSGPT 15 10/30/2018 07:29 AM    ALKPHOSPHAT 53 10/30/2018 07:29 AM    TBILIRUBIN 0.5 10/30/2018 07:29 AM    ALBUMIN 4.1 10/30/2018 07:29 AM    TOTPROTEIN 7.4 10/30/2018 07:29 AM     Lab Results   Component Value Date/Time    SEDRATEWES 16 09/07/2018 02:39 PM     Lab Results   Component Value Date/Time    HBA1C 6.2 (H) 10/30/2018 07:29 AM     Results for orders placed during the hospital encounter of 03/20/18   DS-BONE DENSITY STUDY (DEXA)    Impression According to the World Health Organization classification, bone mineral density of this patient is osteopenic.        FRAX score not obtained for this patient.            INTERPRETING LOCATION:  [address] [place], [place] [address], [address]     Results for orders placed during the hospital encounter of 04/08/10   DX-KNEES-AP BILATERAL STANDING     Results  for orders placed during the hospital encounter of 12/10/12   DX-PELVIS-1 OR 2 VIEWS    Impression Left hip arthroplasty appears within normal limits     Results for orders placed during the hospital encounter of 04/08/10   DX-KNEES-AP BILATERAL STANDING     Results for orders placed during the hospital encounter of 12/10/12   DX-PELVIS-1 OR 2 VIEWS    Impression Left hip arthroplasty appears within normal limits     Results for orders placed in visit on 04/03/14   DX-ANKLE 3+ VIEWS    Impression Soft tissue swelling. No acute fracture identified.     Assessment and Plan:     1. Osteopenia, post-menopausal  Bone density scan indicates osteopenia only at this point, we will also check vitamin D level and magnesium to assure out  - VITAMIN D,25 HYDROXY; Future  - MAGNESIUM; Future    2. Primary osteoarthritis involving multiple joints  Stable, patient states no pain  - VITAMIN D,25 HYDROXY; Future  - MAGNESIUM; Future    3. Vitamin D deficiency  Today check vitamin D levels  - VITAMIN D,25 HYDROXY; Future  - MAGNESIUM; Future    4. Low magnesium level  Today check magnesium level  - VITAMIN D,25 HYDROXY; Future  - MAGNESIUM; Future    5. Hypothyroidism, unspecified type  Can exacerbate joint pain, I recommend monitoring thyroid on a regular basis to assure it is not contributing to the patient's joint pain  - VITAMIN D,25 HYDROXY; Future  - MAGNESIUM; Future    6. History of bilateral hip replacements  Doing quite well per patient report    Followup: Return in about 1 year (around 2/12/2020). or sooner jraett Sandoval was seen 30 minutes face-to-face of which more than 50% of the time was spent counseling the patient (excluding time for procedures)  regarding  rheumatological condition and care. Therapy was discussed in detail.    Please note that this dictation was created using voice recognition software. I have made every reasonable attempt to correct obvious errors, but I expect that there are errors of  grammar and possibly content that I did not discover before finalizing the note.

## 2019-02-12 NOTE — LETTER
[facility]   [address] [place] [address], [address]  [place] [address]  Phone: [phone]  Fax: [phone]              Encounter Date: 2/12/2019    Dear Dr. Guajardo ref. provider found,    It was a pleasure seeing your patient, Sara Sandoval, on 2/12/2019. Diagnoses of Osteopenia of multiple sites, Primary osteoarthritis involving multiple joints, Vitamin D deficiency, Low magnesium level, Hypothyroidism, unspecified type, and History of bilateral hip replacements were pertinent to this visit.     Please find attached progress note which includes the history I obtained from Ms. Sandoval, my physical examination findings, my impression and recommendations.      Once again, it was a pleasure participating in your patient's care.  Please feel free to contact me if you have any questions or if I can be of any further assistance to your patients.      Sincerely,    Kacey Leonard M.D.  Electronically Signed          PROGRESS NOTE:  No notes on file

## 2019-02-19 ENCOUNTER — APPOINTMENT (RX ONLY)
Dept: URBAN - METROPOLITAN AREA CLINIC 4 | Facility: CLINIC | Age: 74
Setting detail: DERMATOLOGY
End: 2019-02-19

## 2019-02-19 DIAGNOSIS — L57.0 ACTINIC KERATOSIS: ICD-10-CM

## 2019-02-19 DIAGNOSIS — L82.1 OTHER SEBORRHEIC KERATOSIS: ICD-10-CM

## 2019-02-19 PROCEDURE — ? ADDITIONAL NOTES

## 2019-02-19 PROCEDURE — 99212 OFFICE O/P EST SF 10 MIN: CPT | Mod: 25

## 2019-02-19 PROCEDURE — ? COUNSELING

## 2019-02-19 PROCEDURE — 17000 DESTRUCT PREMALG LESION: CPT

## 2019-02-19 PROCEDURE — ? LIQUID NITROGEN

## 2019-02-19 ASSESSMENT — LOCATION SIMPLE DESCRIPTION DERM
LOCATION SIMPLE: LEFT LOWER LIP
LOCATION SIMPLE: ABDOMEN

## 2019-02-19 ASSESSMENT — LOCATION DETAILED DESCRIPTION DERM
LOCATION DETAILED: LEFT INFERIOR VERMILION BORDER
LOCATION DETAILED: RIGHT LATERAL ABDOMEN

## 2019-02-19 ASSESSMENT — LOCATION ZONE DERM
LOCATION ZONE: LIP
LOCATION ZONE: TRUNK

## 2019-02-19 NOTE — PROCEDURE: ADDITIONAL NOTES
Detail Level: Simple
Additional Notes: [id number]
Additional Notes: Includes spot of concern mentioned on intake \\nReassure and observe for changes

## 2019-02-19 NOTE — PROCEDURE: LIQUID NITROGEN
Post-Care Instructions: I reviewed with the patient in detail post-care instructions. Patient is to wear sunprotection, and avoid picking at any of the treated lesions. Pt may apply Vaseline  or Aquaphor to crusted or scabbing areas.
Consent: The patient's consent was obtained including but not limited to risks of crusting, scabbing, blistering, scarring, darker or lighter pigmentary change, recurrence, incomplete removal and infection.
Duration Of Freeze Thaw-Cycle (Seconds): 0
Render Post-Care Instructions In Note?: no
Detail Level: Detailed

## 2019-02-23 ENCOUNTER — HOSPITAL ENCOUNTER (OUTPATIENT)
Dept: LAB | Facility: MEDICAL CENTER | Age: 74
End: 2019-02-23
Attending: INTERNAL MEDICINE
Payer: MEDICARE

## 2019-02-23 DIAGNOSIS — E03.9 HYPOTHYROIDISM, UNSPECIFIED TYPE: ICD-10-CM

## 2019-02-23 DIAGNOSIS — R79.0 LOW MAGNESIUM LEVEL: ICD-10-CM

## 2019-02-23 DIAGNOSIS — E55.9 VITAMIN D DEFICIENCY: ICD-10-CM

## 2019-02-23 DIAGNOSIS — M15.9 PRIMARY OSTEOARTHRITIS INVOLVING MULTIPLE JOINTS: ICD-10-CM

## 2019-02-23 LAB
25(OH)D3 SERPL-MCNC: 49 NG/ML (ref 30–100)
MAGNESIUM SERPL-MCNC: 2 MG/DL (ref 1.5–2.5)

## 2019-02-23 PROCEDURE — 83735 ASSAY OF MAGNESIUM: CPT

## 2019-02-23 PROCEDURE — 36415 COLL VENOUS BLD VENIPUNCTURE: CPT

## 2019-02-23 PROCEDURE — 82306 VITAMIN D 25 HYDROXY: CPT

## 2019-07-16 ENCOUNTER — APPOINTMENT (RX ONLY)
Dept: URBAN - METROPOLITAN AREA CLINIC 4 | Facility: CLINIC | Age: 74
Setting detail: DERMATOLOGY
End: 2019-07-16

## 2019-07-16 DIAGNOSIS — Z85.828 PERSONAL HISTORY OF OTHER MALIGNANT NEOPLASM OF SKIN: ICD-10-CM

## 2019-07-16 DIAGNOSIS — Z87.2 PERSONAL HISTORY OF DISEASES OF THE SKIN AND SUBCUTANEOUS TISSUE: ICD-10-CM

## 2019-07-16 DIAGNOSIS — L81.4 OTHER MELANIN HYPERPIGMENTATION: ICD-10-CM

## 2019-07-16 DIAGNOSIS — L57.0 ACTINIC KERATOSIS: ICD-10-CM

## 2019-07-16 DIAGNOSIS — L30.9 DERMATITIS, UNSPECIFIED: ICD-10-CM

## 2019-07-16 DIAGNOSIS — L82.1 OTHER SEBORRHEIC KERATOSIS: ICD-10-CM

## 2019-07-16 DIAGNOSIS — D18.0 HEMANGIOMA: ICD-10-CM

## 2019-07-16 DIAGNOSIS — Z71.89 OTHER SPECIFIED COUNSELING: ICD-10-CM

## 2019-07-16 DIAGNOSIS — D22 MELANOCYTIC NEVI: ICD-10-CM

## 2019-07-16 DIAGNOSIS — L57.8 OTHER SKIN CHANGES DUE TO CHRONIC EXPOSURE TO NONIONIZING RADIATION: ICD-10-CM

## 2019-07-16 PROBLEM — D22.5 MELANOCYTIC NEVI OF TRUNK: Status: ACTIVE | Noted: 2019-07-16

## 2019-07-16 PROBLEM — D18.01 HEMANGIOMA OF SKIN AND SUBCUTANEOUS TISSUE: Status: ACTIVE | Noted: 2019-07-16

## 2019-07-16 PROCEDURE — 17000 DESTRUCT PREMALG LESION: CPT

## 2019-07-16 PROCEDURE — 99214 OFFICE O/P EST MOD 30 MIN: CPT | Mod: 25

## 2019-07-16 PROCEDURE — ? LIQUID NITROGEN

## 2019-07-16 PROCEDURE — ? ADDITIONAL NOTES

## 2019-07-16 PROCEDURE — 17003 DESTRUCT PREMALG LES 2-14: CPT

## 2019-07-16 PROCEDURE — ? COUNSELING

## 2019-07-16 ASSESSMENT — LOCATION ZONE DERM
LOCATION ZONE: EYELID
LOCATION ZONE: ARM
LOCATION ZONE: AXILLAE
LOCATION ZONE: LEG
LOCATION ZONE: NOSE
LOCATION ZONE: FACE
LOCATION ZONE: HAND
LOCATION ZONE: TRUNK

## 2019-07-16 ASSESSMENT — LOCATION DETAILED DESCRIPTION DERM
LOCATION DETAILED: LEFT PROXIMAL DORSAL FOREARM
LOCATION DETAILED: RIGHT POPLITEAL SKIN
LOCATION DETAILED: LEFT MEDIAL CANTHUS
LOCATION DETAILED: UPPER STERNUM
LOCATION DETAILED: INFERIOR THORACIC SPINE
LOCATION DETAILED: LEFT DISTAL DORSAL FOREARM
LOCATION DETAILED: LEFT INFERIOR CENTRAL MALAR CHEEK
LOCATION DETAILED: RIGHT INFERIOR CENTRAL MALAR CHEEK
LOCATION DETAILED: RIGHT PROXIMAL DORSAL FOREARM
LOCATION DETAILED: STERNAL NOTCH
LOCATION DETAILED: RIGHT DISTAL DORSAL FOREARM
LOCATION DETAILED: RIGHT SUPERIOR MEDIAL MIDBACK
LOCATION DETAILED: NASAL DORSUM
LOCATION DETAILED: RIGHT AXILLARY VAULT
LOCATION DETAILED: LEFT INFERIOR MEDIAL MIDBACK
LOCATION DETAILED: LEFT AXILLARY VAULT
LOCATION DETAILED: RIGHT RADIAL DORSAL HAND

## 2019-07-16 ASSESSMENT — LOCATION SIMPLE DESCRIPTION DERM
LOCATION SIMPLE: RIGHT FOREARM
LOCATION SIMPLE: RIGHT POPLITEAL SKIN
LOCATION SIMPLE: UPPER BACK
LOCATION SIMPLE: RIGHT LOWER BACK
LOCATION SIMPLE: LEFT LOWER BACK
LOCATION SIMPLE: LEFT EYELID
LOCATION SIMPLE: RIGHT CHEEK
LOCATION SIMPLE: RIGHT AXILLARY VAULT
LOCATION SIMPLE: NOSE
LOCATION SIMPLE: RIGHT HAND
LOCATION SIMPLE: CHEST
LOCATION SIMPLE: LEFT CHEEK
LOCATION SIMPLE: LEFT FOREARM
LOCATION SIMPLE: LEFT AXILLARY VAULT

## 2019-07-16 NOTE — PROCEDURE: ADDITIONAL NOTES
Additional Notes: Includes spot of concern mentioned on intake.
Detail Level: Simple
Additional Notes: Recommend Brayden’s Deodorant or OTC 1% Hydrocortisone Cream if itchy.

## 2019-07-16 NOTE — PROCEDURE: LIQUID NITROGEN
Detail Level: Detailed
Duration Of Freeze Thaw-Cycle (Seconds): 0
Render Note In Bullet Format When Appropriate: No
Post-Care Instructions: I reviewed with the patient in detail post-care instructions. Patient is to wear sunprotection, and avoid picking at any of the treated lesions. Pt may apply Vaseline  or Aquaphor to crusted or scabbing areas.
Consent: The patient's consent was obtained including but not limited to risks of crusting, scabbing, blistering, scarring, darker or lighter pigmentary change, recurrence, incomplete removal and infection.

## 2019-10-21 ENCOUNTER — HOSPITAL ENCOUNTER (OUTPATIENT)
Dept: RADIOLOGY | Facility: MEDICAL CENTER | Age: 74
End: 2019-10-21
Attending: FAMILY MEDICINE
Payer: MEDICARE

## 2019-10-21 DIAGNOSIS — Z12.39 BREAST SCREENING: ICD-10-CM

## 2019-10-21 PROCEDURE — 77063 BREAST TOMOSYNTHESIS BI: CPT

## 2019-11-06 ENCOUNTER — HOSPITAL ENCOUNTER (OUTPATIENT)
Dept: LAB | Facility: MEDICAL CENTER | Age: 74
End: 2019-11-06
Attending: PHYSICIAN ASSISTANT
Payer: MEDICARE

## 2019-11-06 LAB
ALBUMIN SERPL BCP-MCNC: 4.3 G/DL (ref 3.2–4.9)
ALBUMIN/GLOB SERPL: 1.4 G/DL
ALP SERPL-CCNC: 59 U/L (ref 30–99)
ALT SERPL-CCNC: 13 U/L (ref 2–50)
ANION GAP SERPL CALC-SCNC: 7 MMOL/L (ref 0–11.9)
AST SERPL-CCNC: 17 U/L (ref 12–45)
BASOPHILS # BLD AUTO: 1.3 % (ref 0–1.8)
BASOPHILS # BLD: 0.06 K/UL (ref 0–0.12)
BILIRUB SERPL-MCNC: 0.6 MG/DL (ref 0.1–1.5)
BUN SERPL-MCNC: 17 MG/DL (ref 8–22)
CALCIUM SERPL-MCNC: 9.4 MG/DL (ref 8.5–10.5)
CHLORIDE SERPL-SCNC: 106 MMOL/L (ref 96–112)
CHOLEST SERPL-MCNC: 222 MG/DL (ref 100–199)
CO2 SERPL-SCNC: 25 MMOL/L (ref 20–33)
CREAT SERPL-MCNC: 0.97 MG/DL (ref 0.5–1.4)
EOSINOPHIL # BLD AUTO: 0.14 K/UL (ref 0–0.51)
EOSINOPHIL NFR BLD: 3 % (ref 0–6.9)
ERYTHROCYTE [DISTWIDTH] IN BLOOD BY AUTOMATED COUNT: 47.9 FL (ref 35.9–50)
EST. AVERAGE GLUCOSE BLD GHB EST-MCNC: 117 MG/DL
FASTING STATUS PATIENT QL REPORTED: NORMAL
GLOBULIN SER CALC-MCNC: 3 G/DL (ref 1.9–3.5)
GLUCOSE SERPL-MCNC: 89 MG/DL (ref 65–99)
HBA1C MFR BLD: 5.7 % (ref 0–5.6)
HCT VFR BLD AUTO: 40.5 % (ref 37–47)
HDLC SERPL-MCNC: 59 MG/DL
HGB BLD-MCNC: 12.8 G/DL (ref 12–16)
IMM GRANULOCYTES # BLD AUTO: 0.01 K/UL (ref 0–0.11)
IMM GRANULOCYTES NFR BLD AUTO: 0.2 % (ref 0–0.9)
LDLC SERPL CALC-MCNC: 141 MG/DL
LYMPHOCYTES # BLD AUTO: 1.96 K/UL (ref 1–4.8)
LYMPHOCYTES NFR BLD: 42.2 % (ref 22–41)
MCH RBC QN AUTO: 31.1 PG (ref 27–33)
MCHC RBC AUTO-ENTMCNC: 31.6 G/DL (ref 33.6–35)
MCV RBC AUTO: 98.5 FL (ref 81.4–97.8)
MONOCYTES # BLD AUTO: 0.38 K/UL (ref 0–0.85)
MONOCYTES NFR BLD AUTO: 8.2 % (ref 0–13.4)
NEUTROPHILS # BLD AUTO: 2.1 K/UL (ref 2–7.15)
NEUTROPHILS NFR BLD: 45.1 % (ref 44–72)
NRBC # BLD AUTO: 0 K/UL
NRBC BLD-RTO: 0 /100 WBC
PLATELET # BLD AUTO: 304 K/UL (ref 164–446)
PMV BLD AUTO: 9.7 FL (ref 9–12.9)
POTASSIUM SERPL-SCNC: 4.7 MMOL/L (ref 3.6–5.5)
PROT SERPL-MCNC: 7.3 G/DL (ref 6–8.2)
RBC # BLD AUTO: 4.11 M/UL (ref 4.2–5.4)
SODIUM SERPL-SCNC: 138 MMOL/L (ref 135–145)
T3FREE SERPL-MCNC: 4.47 PG/ML (ref 2.4–4.2)
T4 FREE SERPL-MCNC: 1.49 NG/DL (ref 0.53–1.43)
TRIGL SERPL-MCNC: 111 MG/DL (ref 0–149)
TSH SERPL DL<=0.005 MIU/L-ACNC: 0.51 UIU/ML (ref 0.38–5.33)
WBC # BLD AUTO: 4.7 K/UL (ref 4.8–10.8)

## 2019-11-06 PROCEDURE — 80061 LIPID PANEL: CPT

## 2019-11-06 PROCEDURE — 80053 COMPREHEN METABOLIC PANEL: CPT

## 2019-11-06 PROCEDURE — 36415 COLL VENOUS BLD VENIPUNCTURE: CPT

## 2019-11-06 PROCEDURE — 84439 ASSAY OF FREE THYROXINE: CPT

## 2019-11-06 PROCEDURE — 83036 HEMOGLOBIN GLYCOSYLATED A1C: CPT | Mod: GA

## 2019-11-06 PROCEDURE — 84481 FREE ASSAY (FT-3): CPT

## 2019-11-06 PROCEDURE — 85025 COMPLETE CBC W/AUTO DIFF WBC: CPT

## 2019-11-06 PROCEDURE — 84443 ASSAY THYROID STIM HORMONE: CPT

## 2020-01-14 ENCOUNTER — APPOINTMENT (RX ONLY)
Dept: URBAN - METROPOLITAN AREA CLINIC 4 | Facility: CLINIC | Age: 75
Setting detail: DERMATOLOGY
End: 2020-01-14

## 2020-01-14 DIAGNOSIS — Z85.828 PERSONAL HISTORY OF OTHER MALIGNANT NEOPLASM OF SKIN: ICD-10-CM

## 2020-01-14 DIAGNOSIS — D18.0 HEMANGIOMA: ICD-10-CM

## 2020-01-14 DIAGNOSIS — L57.8 OTHER SKIN CHANGES DUE TO CHRONIC EXPOSURE TO NONIONIZING RADIATION: ICD-10-CM

## 2020-01-14 DIAGNOSIS — L82.1 OTHER SEBORRHEIC KERATOSIS: ICD-10-CM

## 2020-01-14 DIAGNOSIS — L70.8 OTHER ACNE: ICD-10-CM

## 2020-01-14 DIAGNOSIS — D22 MELANOCYTIC NEVI: ICD-10-CM

## 2020-01-14 DIAGNOSIS — Z71.89 OTHER SPECIFIED COUNSELING: ICD-10-CM

## 2020-01-14 DIAGNOSIS — M67.4 GANGLION: ICD-10-CM

## 2020-01-14 DIAGNOSIS — L81.4 OTHER MELANIN HYPERPIGMENTATION: ICD-10-CM

## 2020-01-14 DIAGNOSIS — Z87.2 PERSONAL HISTORY OF DISEASES OF THE SKIN AND SUBCUTANEOUS TISSUE: ICD-10-CM

## 2020-01-14 PROBLEM — D18.01 HEMANGIOMA OF SKIN AND SUBCUTANEOUS TISSUE: Status: ACTIVE | Noted: 2020-01-14

## 2020-01-14 PROBLEM — M67.441 GANGLION, RIGHT HAND: Status: ACTIVE | Noted: 2020-01-14

## 2020-01-14 PROBLEM — D22.5 MELANOCYTIC NEVI OF TRUNK: Status: ACTIVE | Noted: 2020-01-14

## 2020-01-14 PROCEDURE — ? COUNSELING

## 2020-01-14 PROCEDURE — 99214 OFFICE O/P EST MOD 30 MIN: CPT

## 2020-01-14 PROCEDURE — ? OBSERVATION AND MEASURE

## 2020-01-14 PROCEDURE — ? ADDITIONAL NOTES

## 2020-01-14 ASSESSMENT — LOCATION DETAILED DESCRIPTION DERM
LOCATION DETAILED: RIGHT INFERIOR CENTRAL MALAR CHEEK
LOCATION DETAILED: RIGHT SUPERIOR MEDIAL MIDBACK
LOCATION DETAILED: RIGHT POPLITEAL SKIN
LOCATION DETAILED: RIGHT PROXIMAL DORSAL FOREARM
LOCATION DETAILED: RIGHT PROXIMAL PALMAR RING FINGER
LOCATION DETAILED: LEFT PROXIMAL DORSAL FOREARM
LOCATION DETAILED: LEFT INFERIOR MEDIAL MIDBACK
LOCATION DETAILED: UPPER STERNUM
LOCATION DETAILED: LEFT INFERIOR CENTRAL MALAR CHEEK
LOCATION DETAILED: INFERIOR THORACIC SPINE
LOCATION DETAILED: STERNAL NOTCH
LOCATION DETAILED: RIGHT DISTAL DORSAL FOREARM
LOCATION DETAILED: LEFT INFERIOR LATERAL NECK
LOCATION DETAILED: LEFT MEDIAL CANTHUS
LOCATION DETAILED: LEFT DISTAL DORSAL FOREARM

## 2020-01-14 ASSESSMENT — LOCATION SIMPLE DESCRIPTION DERM
LOCATION SIMPLE: RIGHT POPLITEAL SKIN
LOCATION SIMPLE: LEFT CHEEK
LOCATION SIMPLE: RIGHT FOREARM
LOCATION SIMPLE: RIGHT CHEEK
LOCATION SIMPLE: RIGHT LOWER BACK
LOCATION SIMPLE: CHEST
LOCATION SIMPLE: LEFT ANTERIOR NECK
LOCATION SIMPLE: UPPER BACK
LOCATION SIMPLE: LEFT FOREARM
LOCATION SIMPLE: LEFT LOWER BACK
LOCATION SIMPLE: LEFT EYELID
LOCATION SIMPLE: RIGHT RING FINGER

## 2020-01-14 ASSESSMENT — LOCATION ZONE DERM
LOCATION ZONE: LEG
LOCATION ZONE: NECK
LOCATION ZONE: ARM
LOCATION ZONE: FACE
LOCATION ZONE: TRUNK
LOCATION ZONE: EYELID
LOCATION ZONE: FINGER

## 2020-01-14 NOTE — PROCEDURE: MIPS QUALITY
Quality 130: Documentation Of Current Medications In The Medical Record: Current Medications Documented
Detail Level: Detailed
Quality 226: Preventive Care And Screening: Tobacco Use: Screening And Cessation Intervention: Patient screened for tobacco use and is an ex/non-smoker
Quality 111:Pneumonia Vaccination Status For Older Adults: Pneumococcal Vaccination Previously Received

## 2020-02-18 ENCOUNTER — OFFICE VISIT (OUTPATIENT)
Dept: RHEUMATOLOGY | Facility: MEDICAL CENTER | Age: 75
End: 2020-02-18
Payer: MEDICARE

## 2020-02-18 VITALS
OXYGEN SATURATION: 97 % | RESPIRATION RATE: 14 BRPM | TEMPERATURE: 98.2 F | SYSTOLIC BLOOD PRESSURE: 140 MMHG | HEART RATE: 74 BPM | BODY MASS INDEX: 24.72 KG/M2 | WEIGHT: 144 LBS | DIASTOLIC BLOOD PRESSURE: 80 MMHG

## 2020-02-18 DIAGNOSIS — M81.0 OSTEOPOROSIS, POST-MENOPAUSAL: ICD-10-CM

## 2020-02-18 DIAGNOSIS — M15.9 PRIMARY OSTEOARTHRITIS INVOLVING MULTIPLE JOINTS: ICD-10-CM

## 2020-02-18 DIAGNOSIS — E03.9 HYPOTHYROIDISM, UNSPECIFIED TYPE: ICD-10-CM

## 2020-02-18 PROCEDURE — 99213 OFFICE O/P EST LOW 20 MIN: CPT | Performed by: INTERNAL MEDICINE

## 2020-02-18 NOTE — PROGRESS NOTES
Chief Complaint- joint pain     Subjective:   Sara Sandoval is a 74 y.o. female here today for follow up of rheumatological issues    This is a follow-up visit for this patient who is seen in this clinic for osteoporosis.  Patient was previously followed by Dr. Felix.  Last bone density scan only indicates osteopenia, patient currently not on any bisphosphonates or bone modulating medications.  Patient does continue to take  calcium citrate 1200 mg by mouth daily and vitamin D about 2000 units by mouth daily and magnesium 200 mg by mouth daily.  Patient is also actively exercising with weight lifting to try and help keep her bone density strong.  Patient denies any fractures.     Of note patient status post bilateral hip replacements and was told by her orthopedic surgeon that it was imperative that she keep her bones strong so as to maintain her bilateral hip replacements. Patient doesn't really have much complaints, is doing exercise programs to maintain her strength.     Bilateral CHLOE     S/p fosamax [id number]  S/p actonel until 2010     DEXA 1/2014 T scores -1.5, -3.0  DEXA 1/2016 T scores -2.0, -1.9     DEXA 3/20/2018 T scores -2.2, -2.0     Addendum 6/2/2020  DEXA 6/2/2020 T scores -2.5, -1.8  FRAX 6/2/2020 not done    Current medicines (including changes today)  Current Outpatient Medications   Medication Sig Dispense Refill   • lisinopril (PRINIVIL) 20 MG Tab Take 20 mg by mouth every day.     • aspirin (ASA) 81 MG CHEW Take 81 mg by mouth every day.       • psyllium (METAMUCIL) 58.12 % PACK Take 1 Packet by mouth every day.     • levothyroxine (SYNTHROID) 50 MCG TABS Take 75 mcg by mouth every day.     • Cholecalciferol (VITAMIN D3) 2000 UNIT TABS Take  by mouth every day.     • Calcium-Magnesium-Vitamin D (CITRACAL CALCIUM+D PO) Take 1,000 mg by mouth every day.     • Multiple Vitamin (MULTIVITAMIN PO) Take  by mouth every day.     • Lutein 20 MG TABS Take  by mouth every day.     • ibuprofen  (MOTRIN) 800 MG TABS Take 800 mg by mouth as needed.       No current facility-administered medications for this visit.      She  has a past medical history of Arthritis, Pain (6/11/12), Shingles (2010), and Unspecified disorder of thyroid.    ROS   Other than what is mentioned in HPI or physical exam, there is no history of headaches, double vision or blurred vision. No temporal tenderness or jaw claudication. No trouble swallowing difficulties or sore throats.  No chest complaints including chest pain, cough or sputum production. No GI complaints including nausea, vomiting, change in bowel habits, or past peptic ulcer disease. No history of blood in the stools. No urinary complaints including dysuria or frequency. No history of alopecia, photosensitivity, oral ulcerations, Raynaud's phenomena.       Objective:     /80   Pulse 74   Temp 36.8 °C (98.2 °F) (Temporal)   Resp 14   Wt 65.3 kg (144 lb)   SpO2 97%  Body mass index is 24.72 kg/m².   Physical Exam:    Constitutional: Alert and oriented X3, patient is talkative with good eye contact.Skin: Warm, dry, good turgor, no rashes in visible areas.Eye: Equal, round and reactive, conjunctiva clear, lids normal EOM intactENMT: Lips without lesions, good dentition, no oropharyngeal ulcers, moist buccal mucosa, pinna without deformityNeck: Trachea midline, no masses, no thyromegaly.Lymph:  No cervical lymphadenopathy, no axillary lymphadenopathy, no supraclavicular lymphadenopathyRespiratory: Unlabored respiratory effort, lungs clear to auscultation, no wheezes, no ronchi.Cardiovascular: Normal S1, S2, no murmur, no edema.Abdomen: Soft, non-tender, no masses, no hepatosplenomegaly.Psych: Alert and oriented x3, normal affect and mood.Neuro: Cranial nerves 2-12 are grossly intact, no loss of sensation LEExt:no joint laxity noted in bilateral arms, no joint laxity noted in bilateral legs, small joints of the hands are unremarkable other than Heberden's nodes on  some DIP joints, no dowagers hump, gait without tilting without foot drop, well-healed scar from bilateral THAs.    Lab Results   Component Value Date/Time    HEPCAB Negative 10/10/2017 07:06 AM     Lab Results   Component Value Date/Time    SODIUM 138 11/06/2019 07:18 AM    POTASSIUM 4.7 11/06/2019 07:18 AM    CHLORIDE 106 11/06/2019 07:18 AM    CO2 25 11/06/2019 07:18 AM    GLUCOSE 89 11/06/2019 07:18 AM    BUN 17 11/06/2019 07:18 AM    CREATININE 0.97 11/06/2019 07:18 AM      Lab Results   Component Value Date/Time    WBC 4.7 (L) 11/06/2019 07:18 AM    RBC 4.11 (L) 11/06/2019 07:18 AM    HEMOGLOBIN 12.8 11/06/2019 07:18 AM    HEMATOCRIT 40.5 11/06/2019 07:18 AM    MCV 98.5 (H) 11/06/2019 07:18 AM    MCH 31.1 11/06/2019 07:18 AM    MCHC 31.6 (L) 11/06/2019 07:18 AM    MPV 9.7 11/06/2019 07:18 AM    NEUTSPOLYS 45.10 11/06/2019 07:18 AM    LYMPHOCYTES 42.20 (H) 11/06/2019 07:18 AM    MONOCYTES 8.20 11/06/2019 07:18 AM    EOSINOPHILS 3.00 11/06/2019 07:18 AM    BASOPHILS 1.30 11/06/2019 07:18 AM      Lab Results   Component Value Date/Time    CALCIUM 9.4 11/06/2019 07:18 AM    ASTSGOT 17 11/06/2019 07:18 AM    ALTSGPT 13 11/06/2019 07:18 AM    ALKPHOSPHAT 59 11/06/2019 07:18 AM    TBILIRUBIN 0.6 11/06/2019 07:18 AM    ALBUMIN 4.3 11/06/2019 07:18 AM    TOTPROTEIN 7.3 11/06/2019 07:18 AM     Lab Results   Component Value Date/Time    SEDRATEWES 16 09/07/2018 02:39 PM     Lab Results   Component Value Date/Time    HBA1C 5.7 (H) 11/06/2019 07:18 AM     Results for orders placed during the hospital encounter of 03/20/18   DS-BONE DENSITY STUDY (DEXA)    Impression According to the World Health Organization classification, bone mineral density of this patient is osteopenic.        FRAX score not obtained for this patient.            INTERPRETING LOCATION:  [address] [place], [place] [address], [address]     Results for orders placed during the hospital encounter of 04/08/10   DX-KNEES-AP BILATERAL STANDING     Results for orders placed  during the hospital encounter of 12/10/12   DX-PELVIS-1 OR 2 VIEWS    Impression Left hip arthroplasty appears within normal limits     Results for orders placed during the hospital encounter of 04/08/10   DX-KNEES-AP BILATERAL STANDING     Results for orders placed during the hospital encounter of 12/10/12   DX-PELVIS-1 OR 2 VIEWS    Impression Left hip arthroplasty appears within normal limits     Results for orders placed in visit on 04/03/14   DX-ANKLE 3+ VIEWS    Impression Soft tissue swelling. No acute fracture identified.     Assessment and Plan:     1. Osteoporosis, post-menopausal  Last DEXA March 2018 patient due for DEXA March 2020 we will schedule for patient   continue calcium citrate 1200 mg by mouth daily and vitamin D about 2000 units by mouth daily and magnesium 200 mg by mouth daily  - DS-BONE DENSITY STUDY (DEXA); Future    2. Primary osteoarthritis involving multiple joints  Tylenol or NSAIDs PRN    3. Hypothyroidism, unspecified type  Can exacerbate joint pain, I recommend monitoring thyroid on a regular basis to assure it is not contributing to the patient's joint pain    Followup: Return in about 1 year (around 2/18/2021). or sooner prn    Sara Sandoval  was seen 30 minutes face-to-face of which more than 50% of the time was spent counseling the patient (excluding time for procedures)  regarding  rheumatological condition and care. Therapy was discussed in detail.      Please note that this dictation was created using voice recognition software. I have made every reasonable attempt to correct obvious errors, but I expect that there are errors of grammar and possibly content that I did not discover before finalizing the note.

## 2020-06-02 ENCOUNTER — HOSPITAL ENCOUNTER (OUTPATIENT)
Dept: RADIOLOGY | Facility: MEDICAL CENTER | Age: 75
End: 2020-06-02
Attending: INTERNAL MEDICINE
Payer: MEDICARE

## 2020-06-02 DIAGNOSIS — M81.0 OSTEOPOROSIS, POST-MENOPAUSAL: ICD-10-CM

## 2020-06-02 PROCEDURE — 77080 DXA BONE DENSITY AXIAL: CPT

## 2020-06-03 ENCOUNTER — TELEPHONE (OUTPATIENT)
Dept: RHEUMATOLOGY | Facility: MEDICAL CENTER | Age: 75
End: 2020-06-03

## 2020-06-03 NOTE — TELEPHONE ENCOUNTER
----- Message from Tamiko Sandoval sent at 6/3/2020 10:56 AM PDT -----  Regarding: RE:DEXA scan  Contact: [phone]  Thank you for your quick reply!  My mom had mild osteoporosis but lived to 84 with no broken bones.   As we discussed before,  I take the calcium and Vit D recommended and exercise daily.   I wear a weighted vest when walking too.  I will do what you think but really don't want to take any bone drugs................I was on fosamax for 8 years.  Could you please send me my results?  Thank you and stay safe!  Tamiko

## 2020-07-06 ENCOUNTER — APPOINTMENT (RX ONLY)
Dept: URBAN - METROPOLITAN AREA CLINIC 4 | Facility: CLINIC | Age: 75
Setting detail: DERMATOLOGY
End: 2020-07-06

## 2020-07-06 DIAGNOSIS — Z71.89 OTHER SPECIFIED COUNSELING: ICD-10-CM

## 2020-07-06 DIAGNOSIS — L82.1 OTHER SEBORRHEIC KERATOSIS: ICD-10-CM

## 2020-07-06 DIAGNOSIS — Z87.2 PERSONAL HISTORY OF DISEASES OF THE SKIN AND SUBCUTANEOUS TISSUE: ICD-10-CM

## 2020-07-06 DIAGNOSIS — L82.0 INFLAMED SEBORRHEIC KERATOSIS: ICD-10-CM

## 2020-07-06 DIAGNOSIS — Z85.828 PERSONAL HISTORY OF OTHER MALIGNANT NEOPLASM OF SKIN: ICD-10-CM

## 2020-07-06 DIAGNOSIS — L57.0 ACTINIC KERATOSIS: ICD-10-CM

## 2020-07-06 PROBLEM — D48.5 NEOPLASM OF UNCERTAIN BEHAVIOR OF SKIN: Status: ACTIVE | Noted: 2020-07-06

## 2020-07-06 PROCEDURE — 99213 OFFICE O/P EST LOW 20 MIN: CPT | Mod: 25

## 2020-07-06 PROCEDURE — ? LIQUID NITROGEN

## 2020-07-06 PROCEDURE — ? BIOPSY BY SHAVE METHOD

## 2020-07-06 PROCEDURE — 17000 DESTRUCT PREMALG LESION: CPT | Mod: 59

## 2020-07-06 PROCEDURE — 11102 TANGNTL BX SKIN SINGLE LES: CPT

## 2020-07-06 PROCEDURE — ? COUNSELING

## 2020-07-06 ASSESSMENT — LOCATION DETAILED DESCRIPTION DERM
LOCATION DETAILED: RIGHT RADIAL DORSAL HAND
LOCATION DETAILED: RIGHT POPLITEAL SKIN
LOCATION DETAILED: RIGHT SUPERIOR LATERAL UPPER BACK
LOCATION DETAILED: RIGHT PROXIMAL RADIAL DORSAL FOREARM
LOCATION DETAILED: LEFT MEDIAL CANTHUS
LOCATION DETAILED: UPPER STERNUM

## 2020-07-06 ASSESSMENT — LOCATION SIMPLE DESCRIPTION DERM
LOCATION SIMPLE: RIGHT FOREARM
LOCATION SIMPLE: RIGHT HAND
LOCATION SIMPLE: RIGHT POPLITEAL SKIN
LOCATION SIMPLE: CHEST
LOCATION SIMPLE: LEFT EYELID
LOCATION SIMPLE: RIGHT BACK

## 2020-07-06 ASSESSMENT — LOCATION ZONE DERM
LOCATION ZONE: LEG
LOCATION ZONE: ARM
LOCATION ZONE: TRUNK
LOCATION ZONE: EYELID
LOCATION ZONE: HAND

## 2020-07-06 NOTE — PROCEDURE: BIOPSY BY SHAVE METHOD

## 2020-07-06 NOTE — PROCEDURE: REASSURANCE
Detail Level: Simple
Additional Notes (Optional): Epithelial and spindle cell proliferation
Hide Additional Notes?: No
Additional Note: Reassure and observe for changes.
Detail Level: Zone

## 2020-07-06 NOTE — PROCEDURE: LIQUID NITROGEN
Render Post-Care Instructions In Note?: no
Post-Care Instructions: I reviewed with the patient in detail post-care instructions. Patient is to wear sunprotection, and avoid picking at any of the treated lesions. Pt may apply Vaseline  or Aquaphor to crusted or scabbing areas.
Detail Level: Detailed
Consent: The patient's consent was obtained including but not limited to risks of crusting, scabbing, blistering, scarring, darker or lighter pigmentary change, recurrence, incomplete removal and infection.
Duration Of Freeze Thaw-Cycle (Seconds): 0

## 2020-07-14 ENCOUNTER — APPOINTMENT (RX ONLY)
Dept: URBAN - METROPOLITAN AREA CLINIC 4 | Facility: CLINIC | Age: 75
Setting detail: DERMATOLOGY
End: 2020-07-14

## 2020-07-14 DIAGNOSIS — Z71.89 OTHER SPECIFIED COUNSELING: ICD-10-CM

## 2020-07-14 DIAGNOSIS — L259 CONTACT DERMATITIS AND OTHER ECZEMA, UNSPECIFIED CAUSE: ICD-10-CM

## 2020-07-14 PROBLEM — L23.9 ALLERGIC CONTACT DERMATITIS, UNSPECIFIED CAUSE: Status: ACTIVE | Noted: 2020-07-14

## 2020-07-14 PROCEDURE — 99213 OFFICE O/P EST LOW 20 MIN: CPT | Mod: 24

## 2020-07-14 PROCEDURE — ? COUNSELING

## 2020-07-14 PROCEDURE — ? ADDITIONAL NOTES

## 2020-07-14 PROCEDURE — ? PRESCRIPTION

## 2020-07-14 RX ORDER — TRIAMCINOLONE ACETONIDE 1 MG/G
1 OINTMENT TOPICAL QD
Qty: 1 | Refills: 0 | Status: ERX | COMMUNITY
Start: 2020-07-14

## 2020-07-14 RX ADMIN — TRIAMCINOLONE ACETONIDE 1: 1 OINTMENT TOPICAL at 00:00

## 2020-07-14 NOTE — PROCEDURE: ADDITIONAL NOTES
Additional Notes: Advised patient wound is still healing. \\nPatient has reaction from bandage. \\nDiscussed pathology results and will schedule once PA is approved.\\nWill send Rx for Triamcinolone Ointment today.
Detail Level: Simple

## 2020-07-16 ENCOUNTER — HOSPITAL ENCOUNTER (OUTPATIENT)
Dept: LAB | Facility: MEDICAL CENTER | Age: 75
End: 2020-07-16
Attending: PHYSICIAN ASSISTANT
Payer: MEDICARE

## 2020-07-16 LAB
25(OH)D3 SERPL-MCNC: 76 NG/ML (ref 30–100)
ALBUMIN SERPL BCP-MCNC: 4.4 G/DL (ref 3.2–4.9)
ALBUMIN/GLOB SERPL: 1.6 G/DL
ALP SERPL-CCNC: 58 U/L (ref 30–99)
ALT SERPL-CCNC: 15 U/L (ref 2–50)
ANION GAP SERPL CALC-SCNC: 14 MMOL/L (ref 7–16)
AST SERPL-CCNC: 20 U/L (ref 12–45)
BASOPHILS # BLD AUTO: 1.1 % (ref 0–1.8)
BASOPHILS # BLD: 0.06 K/UL (ref 0–0.12)
BILIRUB SERPL-MCNC: 0.4 MG/DL (ref 0.1–1.5)
BUN SERPL-MCNC: 13 MG/DL (ref 8–22)
CALCIUM SERPL-MCNC: 9.5 MG/DL (ref 8.5–10.5)
CHLORIDE SERPL-SCNC: 100 MMOL/L (ref 96–112)
CHOLEST SERPL-MCNC: 191 MG/DL (ref 100–199)
CO2 SERPL-SCNC: 21 MMOL/L (ref 20–33)
CREAT SERPL-MCNC: 1 MG/DL (ref 0.5–1.4)
CREAT UR-MCNC: 135.8 MG/DL
EOSINOPHIL # BLD AUTO: 0.05 K/UL (ref 0–0.51)
EOSINOPHIL NFR BLD: 0.9 % (ref 0–6.9)
ERYTHROCYTE [DISTWIDTH] IN BLOOD BY AUTOMATED COUNT: 49.1 FL (ref 35.9–50)
EST. AVERAGE GLUCOSE BLD GHB EST-MCNC: 117 MG/DL
FASTING STATUS PATIENT QL REPORTED: NORMAL
FERRITIN SERPL-MCNC: 74.4 NG/ML (ref 10–291)
GLOBULIN SER CALC-MCNC: 2.7 G/DL (ref 1.9–3.5)
GLUCOSE SERPL-MCNC: 91 MG/DL (ref 65–99)
HBA1C MFR BLD: 5.7 % (ref 0–5.6)
HCT VFR BLD AUTO: 39.3 % (ref 37–47)
HDLC SERPL-MCNC: 60 MG/DL
HGB BLD-MCNC: 12.8 G/DL (ref 12–16)
IMM GRANULOCYTES # BLD AUTO: 0.01 K/UL (ref 0–0.11)
IMM GRANULOCYTES NFR BLD AUTO: 0.2 % (ref 0–0.9)
LDLC SERPL CALC-MCNC: 114 MG/DL
LYMPHOCYTES # BLD AUTO: 2.34 K/UL (ref 1–4.8)
LYMPHOCYTES NFR BLD: 42.8 % (ref 22–41)
MCH RBC QN AUTO: 31.9 PG (ref 27–33)
MCHC RBC AUTO-ENTMCNC: 32.6 G/DL (ref 33.6–35)
MCV RBC AUTO: 98 FL (ref 81.4–97.8)
MICROALBUMIN UR-MCNC: <1.2 MG/DL
MICROALBUMIN/CREAT UR: NORMAL MG/G (ref 0–30)
MONOCYTES # BLD AUTO: 0.36 K/UL (ref 0–0.85)
MONOCYTES NFR BLD AUTO: 6.6 % (ref 0–13.4)
NEUTROPHILS # BLD AUTO: 2.65 K/UL (ref 2–7.15)
NEUTROPHILS NFR BLD: 48.4 % (ref 44–72)
NRBC # BLD AUTO: 0 K/UL
NRBC BLD-RTO: 0 /100 WBC
PLATELET # BLD AUTO: 264 K/UL (ref 164–446)
PMV BLD AUTO: 9.8 FL (ref 9–12.9)
POTASSIUM SERPL-SCNC: 5.1 MMOL/L (ref 3.6–5.5)
PROT SERPL-MCNC: 7.1 G/DL (ref 6–8.2)
PTH-INTACT SERPL-MCNC: 51.4 PG/ML (ref 14–72)
RBC # BLD AUTO: 4.01 M/UL (ref 4.2–5.4)
SODIUM SERPL-SCNC: 135 MMOL/L (ref 135–145)
T3FREE SERPL-MCNC: 2.27 PG/ML (ref 2–4.4)
T4 FREE SERPL-MCNC: 1.5 NG/DL (ref 0.93–1.7)
TRIGL SERPL-MCNC: 83 MG/DL (ref 0–149)
TSH SERPL DL<=0.005 MIU/L-ACNC: 0.85 UIU/ML (ref 0.38–5.33)
WBC # BLD AUTO: 5.5 K/UL (ref 4.8–10.8)

## 2020-07-16 PROCEDURE — 84443 ASSAY THYROID STIM HORMONE: CPT

## 2020-07-16 PROCEDURE — 82306 VITAMIN D 25 HYDROXY: CPT

## 2020-07-16 PROCEDURE — 85025 COMPLETE CBC W/AUTO DIFF WBC: CPT

## 2020-07-16 PROCEDURE — 84439 ASSAY OF FREE THYROXINE: CPT

## 2020-07-16 PROCEDURE — 36415 COLL VENOUS BLD VENIPUNCTURE: CPT

## 2020-07-16 PROCEDURE — 84481 FREE ASSAY (FT-3): CPT

## 2020-07-16 PROCEDURE — 80061 LIPID PANEL: CPT

## 2020-07-16 PROCEDURE — 82043 UR ALBUMIN QUANTITATIVE: CPT

## 2020-07-16 PROCEDURE — 82570 ASSAY OF URINE CREATININE: CPT

## 2020-07-16 PROCEDURE — 82728 ASSAY OF FERRITIN: CPT

## 2020-07-16 PROCEDURE — 83970 ASSAY OF PARATHORMONE: CPT

## 2020-07-16 PROCEDURE — 80053 COMPREHEN METABOLIC PANEL: CPT

## 2020-07-16 PROCEDURE — 83036 HEMOGLOBIN GLYCOSYLATED A1C: CPT | Mod: GA

## 2020-07-20 ENCOUNTER — APPOINTMENT (RX ONLY)
Dept: URBAN - METROPOLITAN AREA CLINIC 4 | Facility: CLINIC | Age: 75
Setting detail: DERMATOLOGY
End: 2020-07-20

## 2020-07-20 PROBLEM — C44.529 SQUAMOUS CELL CARCINOMA OF SKIN OF OTHER PART OF TRUNK: Status: ACTIVE | Noted: 2020-07-20

## 2020-07-20 PROCEDURE — ? EXCISION

## 2020-07-20 PROCEDURE — 13101 CMPLX RPR TRUNK 2.6-7.5 CM: CPT

## 2020-07-20 PROCEDURE — 11603 EXC TR-EXT MAL+MARG 2.1-3 CM: CPT

## 2020-07-20 NOTE — PROCEDURE: EXCISION
Surgeon (Optional): Rosalia
Biopsy Photograph Reviewed: Yes
Size Of Lesion In Cm: 2.1
X Size Of Lesion In Cm (Optional): 0
Size Of Margin In Cm: 0.2
Excision Method: Elliptical
Anesthesia Volume In Cc: 12
Did You Provide Opioid Counseling: No
Repair Type: Complex
Suturegard Retention Suture: 2-0 Nylon
Retention Suture Bite Size: 3 mm
Length To Time In Minutes Device Was In Place: 10
Number Of Hemigard Strips Per Side: 1
Intermediate / Complex Repair - Final Wound Length In Cm: 5.1
Complex/Intermediate Repair Variations: Crescentic
Width Of Defect Perpendicular To Closure In Cm (Required): 1.6
Distance Of Undermining In Cm (Required): 2.2
Undermining Type: Entire Wound
Debridement Text: The wound edges were debrided prior to proceeding with the closure to facilitate wound healing.
Helical Rim Text: The closure involved the helical rim.
Vermilion Border Text: The closure involved the vermilion border.
Nostril Rim Text: The closure involved the nostril rim.
Retention Suture Text: Retention sutures were placed to support the closure and prevent dehiscence.
Lab: [id number]
Lab Facility: 
Graft Donor Site Bandage (Optional-Leave Blank If You Don't Want In Note): Steri-strips and a pressure bandage were applied to the donor site.
Epidermal Closure Graft Donor Site (Optional): simple interrupted
Billing Type: Third-Party Bill
Excision Depth: adipose tissue
Scalpel Size: 15 blade
Anesthesia Type: 1% lidocaine with epinephrine
Additional Anesthesia Volume In Cc: 6
Hemostasis: Electrocautery
Estimated Blood Loss (Cc): minimal
Detail Level: Detailed
Repair Anesthesia Method: local infiltration
Undermining Location (Optional): in the superficial subcutaneous fat
Deep Sutures: 4-0 Vicryl
Dermal Closure: buried vertical mattress
Epidermal Sutures: 5-0 Caprosyn
Epidermal Closure: running subcuticular
Wound Care: Bacitracin
Dressing: dry sterile dressing
Suturegard Intro: Intraoperative tissue expansion was performed, utilizing the SUTUREGARD device, in order to reduce wound tension.
Suturegard Body: The suture ends were repeatedly re-tightened and re-clamped to achieve the desired tissue expansion.
Hemigard Intro: Due to skin fragility and wound tension, it was decided to use HEMIGARD adhesive retention suture devices to permit a linear closure. The skin was cleaned and dried for a 6cm distance away from the wound. Excessive hair, if present, was removed to allow for adhesion.
Hemigard Postcare Instructions: The HEMIGARD strips are to remain completely dry for at least 5-7 days.
Positioning (Leave Blank If You Do Not Want): The patient was placed in a comfortable position exposing the surgical site.
Pre-Excision Curettage Text (Leave Blank If You Do Not Want): Prior to drawing the surgical margin the visible lesion was removed with electrodesiccation and curettage to clearly define the lesion size.
Complex Repair Preamble Text (Leave Blank If You Do Not Want): Extensive wide undermining was performed.
Intermediate Repair Preamble Text (Leave Blank If You Do Not Want): Undermining was performed with blunt dissection.
Curvilinear Excision Additional Text (Leave Blank If You Do Not Want): The margin was drawn around the clinically apparent lesion.  A curvilinear shape was then drawn on the skin incorporating the lesion and margins.  Incisions were then made along these lines to the appropriate tissue plane and the lesion was extirpated.
Fusiform Excision Additional Text (Leave Blank If You Do Not Want): The margin was drawn around the clinically apparent lesion.  A fusiform shape was then drawn on the skin incorporating the lesion and margins.  Incisions were then made along these lines to the appropriate tissue plane and the lesion was extirpated.
Elliptical Excision Additional Text (Leave Blank If You Do Not Want): The margin was drawn around the clinically apparent lesion.  An elliptical shape was then drawn on the skin incorporating the lesion and margins.  Incisions were then made along these lines to the appropriate tissue plane and the lesion was extirpated.
Saucerization Excision Additional Text (Leave Blank If You Do Not Want): The margin was drawn around the clinically apparent lesion.  Incisions were then made along these lines, in a tangential fashion, to the appropriate tissue plane and the lesion was extirpated.
Slit Excision Additional Text (Leave Blank If You Do Not Want): A linear line was drawn on the skin overlying the lesion. An incision was made slowly until the lesion was visualized.  Once visualized, the lesion was removed with blunt dissection.
Excisional Biopsy Additional Text (Leave Blank If You Do Not Want): The margin was drawn around the clinically apparent lesion. An elliptical shape was then drawn on the skin incorporating the lesion and margins.  Incisions were then made along these lines to the appropriate tissue plane and the lesion was extirpated.
Perilesional Excision Additional Text (Leave Blank If You Do Not Want): The margin was drawn around the clinically apparent lesion. Incisions were then made along these lines to the appropriate tissue plane and the lesion was extirpated.
Repair Performed By Another Provider Text (Leave Blank If You Do Not Want): After the tissue was excised the defect was repaired by another provider.
No Repair - Repaired With Adjacent Surgical Defect Text (Leave Blank If You Do Not Want): After the excision the defect was repaired concurrently with another surgical defect which was in close approximation.
Advancement Flap (Single) Text: The defect edges were debeveled with a #15 scalpel blade.  Given the location of the defect and the proximity to free margins a single advancement flap was deemed most appropriate.  Using a sterile surgical marker, an appropriate advancement flap was drawn incorporating the defect and placing the expected incisions within the relaxed skin tension lines where possible.    The area thus outlined was incised deep to adipose tissue with a #15 scalpel blade.  The skin margins were undermined to an appropriate distance in all directions utilizing iris scissors.
Advancement Flap (Double) Text: The defect edges were debeveled with a #15 scalpel blade.  Given the location of the defect and the proximity to free margins a double advancement flap was deemed most appropriate.  Using a sterile surgical marker, the appropriate advancement flaps were drawn incorporating the defect and placing the expected incisions within the relaxed skin tension lines where possible.    The area thus outlined was incised deep to adipose tissue with a #15 scalpel blade.  The skin margins were undermined to an appropriate distance in all directions utilizing iris scissors.
Burow's Advancement Flap Text: The defect edges were debeveled with a #15 scalpel blade.  Given the location of the defect and the proximity to free margins a Burow's advancement flap was deemed most appropriate.  Using a sterile surgical marker, the appropriate advancement flap was drawn incorporating the defect and placing the expected incisions within the relaxed skin tension lines where possible.    The area thus outlined was incised deep to adipose tissue with a #15 scalpel blade.  The skin margins were undermined to an appropriate distance in all directions utilizing iris scissors.
Chonodrocutaneous Helical Advancement Flap Text: The defect edges were debeveled with a #15 scalpel blade.  Given the location of the defect and the proximity to free margins a chondrocutaneous helical advancement flap was deemed most appropriate.  Using a sterile surgical marker, the appropriate advancement flap was drawn incorporating the defect and placing the expected incisions within the relaxed skin tension lines where possible.    The area thus outlined was incised deep to adipose tissue with a #15 scalpel blade.  The skin margins were undermined to an appropriate distance in all directions utilizing iris scissors.
Crescentic Advancement Flap Text: The defect edges were debeveled with a #15 scalpel blade.  Given the location of the defect and the proximity to free margins a crescentic advancement flap was deemed most appropriate.  Using a sterile surgical marker, the appropriate advancement flap was drawn incorporating the defect and placing the expected incisions within the relaxed skin tension lines where possible.    The area thus outlined was incised deep to adipose tissue with a #15 scalpel blade.  The skin margins were undermined to an appropriate distance in all directions utilizing iris scissors.
A-T Advancement Flap Text: The defect edges were debeveled with a #15 scalpel blade.  Given the location of the defect, shape of the defect and the proximity to free margins an A-T advancement flap was deemed most appropriate.  Using a sterile surgical marker, an appropriate advancement flap was drawn incorporating the defect and placing the expected incisions within the relaxed skin tension lines where possible.    The area thus outlined was incised deep to adipose tissue with a #15 scalpel blade.  The skin margins were undermined to an appropriate distance in all directions utilizing iris scissors.
O-T Advancement Flap Text: The defect edges were debeveled with a #15 scalpel blade.  Given the location of the defect, shape of the defect and the proximity to free margins an O-T advancement flap was deemed most appropriate.  Using a sterile surgical marker, an appropriate advancement flap was drawn incorporating the defect and placing the expected incisions within the relaxed skin tension lines where possible.    The area thus outlined was incised deep to adipose tissue with a #15 scalpel blade.  The skin margins were undermined to an appropriate distance in all directions utilizing iris scissors.
O-L Flap Text: The defect edges were debeveled with a #15 scalpel blade.  Given the location of the defect, shape of the defect and the proximity to free margins an O-L flap was deemed most appropriate.  Using a sterile surgical marker, an appropriate advancement flap was drawn incorporating the defect and placing the expected incisions within the relaxed skin tension lines where possible.    The area thus outlined was incised deep to adipose tissue with a #15 scalpel blade.  The skin margins were undermined to an appropriate distance in all directions utilizing iris scissors.
O-Z Flap Text: The defect edges were debeveled with a #15 scalpel blade.  Given the location of the defect, shape of the defect and the proximity to free margins an O-Z flap was deemed most appropriate.  Using a sterile surgical marker, an appropriate transposition flap was drawn incorporating the defect and placing the expected incisions within the relaxed skin tension lines where possible. The area thus outlined was incised deep to adipose tissue with a #15 scalpel blade.  The skin margins were undermined to an appropriate distance in all directions utilizing iris scissors.
Double O-Z Flap Text: The defect edges were debeveled with a #15 scalpel blade.  Given the location of the defect, shape of the defect and the proximity to free margins a Double O-Z flap was deemed most appropriate.  Using a sterile surgical marker, an appropriate transposition flap was drawn incorporating the defect and placing the expected incisions within the relaxed skin tension lines where possible. The area thus outlined was incised deep to adipose tissue with a #15 scalpel blade.  The skin margins were undermined to an appropriate distance in all directions utilizing iris scissors.
V-Y Flap Text: The defect edges were debeveled with a #15 scalpel blade.  Given the location of the defect, shape of the defect and the proximity to free margins a V-Y flap was deemed most appropriate.  Using a sterile surgical marker, an appropriate advancement flap was drawn incorporating the defect and placing the expected incisions within the relaxed skin tension lines where possible.    The area thus outlined was incised deep to adipose tissue with a #15 scalpel blade.  The skin margins were undermined to an appropriate distance in all directions utilizing iris scissors.
Advancement-Rotation Flap Text: The defect edges were debeveled with a #15 scalpel blade.  Given the location of the defect, shape of the defect and the proximity to free margins an advancement-rotation flap was deemed most appropriate.  Using a sterile surgical marker, an appropriate flap was drawn incorporating the defect and placing the expected incisions within the relaxed skin tension lines where possible. The area thus outlined was incised deep to adipose tissue with a #15 scalpel blade.  The skin margins were undermined to an appropriate distance in all directions utilizing iris scissors.
Mercedes Flap Text: The defect edges were debeveled with a #15 scalpel blade.  Given the location of the defect, shape of the defect and the proximity to free margins a Mercedes flap was deemed most appropriate.  Using a sterile surgical marker, an appropriate advancement flap was drawn incorporating the defect and placing the expected incisions within the relaxed skin tension lines where possible. The area thus outlined was incised deep to adipose tissue with a #15 scalpel blade.  The skin margins were undermined to an appropriate distance in all directions utilizing iris scissors.
Modified Advancement Flap Text: The defect edges were debeveled with a #15 scalpel blade.  Given the location of the defect, shape of the defect and the proximity to free margins a modified advancement flap was deemed most appropriate.  Using a sterile surgical marker, an appropriate advancement flap was drawn incorporating the defect and placing the expected incisions within the relaxed skin tension lines where possible.    The area thus outlined was incised deep to adipose tissue with a #15 scalpel blade.  The skin margins were undermined to an appropriate distance in all directions utilizing iris scissors.
Mucosal Advancement Flap Text: Given the location of the defect, shape of the defect and the proximity to free margins a mucosal advancement flap was deemed most appropriate. Incisions were made with a 15 blade scalpel in the appropriate fashion along the cutaneous vermilion border and the mucosal lip. The remaining actinically damaged mucosal tissue was excised.  The mucosal advancement flap was then elevated to the gingival sulcus with care taken to preserve the neurovascular structures and advanced into the primary defect. Care was taken to ensure that precise realignment of the vermilion border was achieved.
Peng Advancement Flap Text: The defect edges were debeveled with a #15 scalpel blade.  Given the location of the defect, shape of the defect and the proximity to free margins a Peng advancement flap was deemed most appropriate.  Using a sterile surgical marker, an appropriate advancement flap was drawn incorporating the defect and placing the expected incisions within the relaxed skin tension lines where possible. The area thus outlined was incised deep to adipose tissue with a #15 scalpel blade.  The skin margins were undermined to an appropriate distance in all directions utilizing iris scissors.
Hatchet Flap Text: The defect edges were debeveled with a #15 scalpel blade.  Given the location of the defect, shape of the defect and the proximity to free margins a hatchet flap was deemed most appropriate.  Using a sterile surgical marker, an appropriate hatchet flap was drawn incorporating the defect and placing the expected incisions within the relaxed skin tension lines where possible.    The area thus outlined was incised deep to adipose tissue with a #15 scalpel blade.  The skin margins were undermined to an appropriate distance in all directions utilizing iris scissors.
Rotation Flap Text: The defect edges were debeveled with a #15 scalpel blade.  Given the location of the defect, shape of the defect and the proximity to free margins a rotation flap was deemed most appropriate.  Using a sterile surgical marker, an appropriate rotation flap was drawn incorporating the defect and placing the expected incisions within the relaxed skin tension lines where possible.    The area thus outlined was incised deep to adipose tissue with a #15 scalpel blade.  The skin margins were undermined to an appropriate distance in all directions utilizing iris scissors.
Spiral Flap Text: The defect edges were debeveled with a #15 scalpel blade.  Given the location of the defect, shape of the defect and the proximity to free margins a spiral flap was deemed most appropriate.  Using a sterile surgical marker, an appropriate rotation flap was drawn incorporating the defect and placing the expected incisions within the relaxed skin tension lines where possible. The area thus outlined was incised deep to adipose tissue with a #15 scalpel blade.  The skin margins were undermined to an appropriate distance in all directions utilizing iris scissors.
Star Wedge Flap Text: The defect edges were debeveled with a #15 scalpel blade.  Given the location of the defect, shape of the defect and the proximity to free margins a star wedge flap was deemed most appropriate.  Using a sterile surgical marker, an appropriate rotation flap was drawn incorporating the defect and placing the expected incisions within the relaxed skin tension lines where possible. The area thus outlined was incised deep to adipose tissue with a #15 scalpel blade.  The skin margins were undermined to an appropriate distance in all directions utilizing iris scissors.
Transposition Flap Text: The defect edges were debeveled with a #15 scalpel blade.  Given the location of the defect and the proximity to free margins a transposition flap was deemed most appropriate.  Using a sterile surgical marker, an appropriate transposition flap was drawn incorporating the defect.    The area thus outlined was incised deep to adipose tissue with a #15 scalpel blade.  The skin margins were undermined to an appropriate distance in all directions utilizing iris scissors.
Muscle Hinge Flap Text: The defect edges were debeveled with a #15 scalpel blade.  Given the size, depth and location of the defect and the proximity to free margins a muscle hinge flap was deemed most appropriate.  Using a sterile surgical marker, an appropriate hinge flap was drawn incorporating the defect. The area thus outlined was incised with a #15 scalpel blade.  The skin margins were undermined to an appropriate distance in all directions utilizing iris scissors.
Melolabial Transposition Flap Text: The defect edges were debeveled with a #15 scalpel blade.  Given the location of the defect and the proximity to free margins a melolabial flap was deemed most appropriate.  Using a sterile surgical marker, an appropriate melolabial transposition flap was drawn incorporating the defect.    The area thus outlined was incised deep to adipose tissue with a #15 scalpel blade.  The skin margins were undermined to an appropriate distance in all directions utilizing iris scissors.
Rhombic Flap Text: The defect edges were debeveled with a #15 scalpel blade.  Given the location of the defect and the proximity to free margins a rhombic flap was deemed most appropriate.  Using a sterile surgical marker, an appropriate rhombic flap was drawn incorporating the defect.    The area thus outlined was incised deep to adipose tissue with a #15 scalpel blade.  The skin margins were undermined to an appropriate distance in all directions utilizing iris scissors.
Rhomboid Transposition Flap Text: The defect edges were debeveled with a #15 scalpel blade.  Given the location of the defect and the proximity to free margins a rhomboid transposition flap was deemed most appropriate.  Using a sterile surgical marker, an appropriate rhomboid flap was drawn incorporating the defect.    The area thus outlined was incised deep to adipose tissue with a #15 scalpel blade.  The skin margins were undermined to an appropriate distance in all directions utilizing iris scissors.
Bi-Rhombic Flap Text: The defect edges were debeveled with a #15 scalpel blade.  Given the location of the defect and the proximity to free margins a bi-rhombic flap was deemed most appropriate.  Using a sterile surgical marker, an appropriate rhombic flap was drawn incorporating the defect. The area thus outlined was incised deep to adipose tissue with a #15 scalpel blade.  The skin margins were undermined to an appropriate distance in all directions utilizing iris scissors.
Helical Rim Advancement Flap Text: The defect edges were debeveled with a #15 blade scalpel.  Given the location of the defect and the proximity to free margins (helical rim) a double helical rim advancement flap was deemed most appropriate.  Using a sterile surgical marker, the appropriate advancement flaps were drawn incorporating the defect and placing the expected incisions between the helical rim and antihelix where possible.  The area thus outlined was incised through and through with a #15 scalpel blade.  With a skin hook and iris scissors, the flaps were gently and sharply undermined and freed up.
Bilateral Helical Rim Advancement Flap Text: The defect edges were debeveled with a #15 blade scalpel.  Given the location of the defect and the proximity to free margins (helical rim) a bilateral helical rim advancement flap was deemed most appropriate.  Using a sterile surgical marker, the appropriate advancement flaps were drawn incorporating the defect and placing the expected incisions between the helical rim and antihelix where possible.  The area thus outlined was incised through and through with a #15 scalpel blade.  With a skin hook and iris scissors, the flaps were gently and sharply undermined and freed up.
Ear Star Wedge Flap Text: The defect edges were debeveled with a #15 blade scalpel.  Given the location of the defect and the proximity to free margins (helical rim) an ear star wedge flap was deemed most appropriate.  Using a sterile surgical marker, the appropriate flap was drawn incorporating the defect and placing the expected incisions between the helical rim and antihelix where possible.  The area thus outlined was incised through and through with a #15 scalpel blade.
Banner Transposition Flap Text: The defect edges were debeveled with a #15 scalpel blade.  Given the location of the defect and the proximity to free margins a Banner transposition flap was deemed most appropriate.  Using a sterile surgical marker, an appropriate flap drawn around the defect. The area thus outlined was incised deep to adipose tissue with a #15 scalpel blade.  The skin margins were undermined to an appropriate distance in all directions utilizing iris scissors.
Bilobed Flap Text: The defect edges were debeveled with a #15 scalpel blade.  Given the location of the defect and the proximity to free margins a bilobe flap was deemed most appropriate.  Using a sterile surgical marker, an appropriate bilobe flap drawn around the defect.    The area thus outlined was incised deep to adipose tissue with a #15 scalpel blade.  The skin margins were undermined to an appropriate distance in all directions utilizing iris scissors.
Bilobed Transposition Flap Text: The defect edges were debeveled with a #15 scalpel blade.  Given the location of the defect and the proximity to free margins a bilobed transposition flap was deemed most appropriate.  Using a sterile surgical marker, an appropriate bilobe flap drawn around the defect.    The area thus outlined was incised deep to adipose tissue with a #15 scalpel blade.  The skin margins were undermined to an appropriate distance in all directions utilizing iris scissors.
Trilobed Flap Text: The defect edges were debeveled with a #15 scalpel blade.  Given the location of the defect and the proximity to free margins a trilobed flap was deemed most appropriate.  Using a sterile surgical marker, an appropriate trilobed flap drawn around the defect.    The area thus outlined was incised deep to adipose tissue with a #15 scalpel blade.  The skin margins were undermined to an appropriate distance in all directions utilizing iris scissors.
Dorsal Nasal Flap Text: The defect edges were debeveled with a #15 scalpel blade.  Given the location of the defect and the proximity to free margins a dorsal nasal flap was deemed most appropriate.  Using a sterile surgical marker, an appropriate dorsal nasal flap was drawn around the defect.    The area thus outlined was incised deep to adipose tissue with a #15 scalpel blade.  The skin margins were undermined to an appropriate distance in all directions utilizing iris scissors.
Island Pedicle Flap Text: The defect edges were debeveled with a #15 scalpel blade.  Given the location of the defect, shape of the defect and the proximity to free margins an island pedicle advancement flap was deemed most appropriate.  Using a sterile surgical marker, an appropriate advancement flap was drawn incorporating the defect, outlining the appropriate donor tissue and placing the expected incisions within the relaxed skin tension lines where possible.    The area thus outlined was incised deep to adipose tissue with a #15 scalpel blade.  The skin margins were undermined to an appropriate distance in all directions around the primary defect and laterally outward around the island pedicle utilizing iris scissors.  There was minimal undermining beneath the pedicle flap.
Island Pedicle Flap With Canthal Suspension Text: The defect edges were debeveled with a #15 scalpel blade.  Given the location of the defect, shape of the defect and the proximity to free margins an island pedicle advancement flap was deemed most appropriate.  Using a sterile surgical marker, an appropriate advancement flap was drawn incorporating the defect, outlining the appropriate donor tissue and placing the expected incisions within the relaxed skin tension lines where possible. The area thus outlined was incised deep to adipose tissue with a #15 scalpel blade.  The skin margins were undermined to an appropriate distance in all directions around the primary defect and laterally outward around the island pedicle utilizing iris scissors.  There was minimal undermining beneath the pedicle flap. A suspension suture was placed in the canthal tendon to prevent tension and prevent ectropion.
Alar Island Pedicle Flap Text: The defect edges were debeveled with a #15 scalpel blade.  Given the location of the defect, shape of the defect and the proximity to the alar rim an island pedicle advancement flap was deemed most appropriate.  Using a sterile surgical marker, an appropriate advancement flap was drawn incorporating the defect, outlining the appropriate donor tissue and placing the expected incisions within the nasal ala running parallel to the alar rim. The area thus outlined was incised with a #15 scalpel blade.  The skin margins were undermined minimally to an appropriate distance in all directions around the primary defect and laterally outward around the island pedicle utilizing iris scissors.  There was minimal undermining beneath the pedicle flap.
Double Island Pedicle Flap Text: The defect edges were debeveled with a #15 scalpel blade.  Given the location of the defect, shape of the defect and the proximity to free margins a double island pedicle advancement flap was deemed most appropriate.  Using a sterile surgical marker, an appropriate advancement flap was drawn incorporating the defect, outlining the appropriate donor tissue and placing the expected incisions within the relaxed skin tension lines where possible.    The area thus outlined was incised deep to adipose tissue with a #15 scalpel blade.  The skin margins were undermined to an appropriate distance in all directions around the primary defect and laterally outward around the island pedicle utilizing iris scissors.  There was minimal undermining beneath the pedicle flap.
Island Pedicle Flap-Requiring Vessel Identification Text: The defect edges were debeveled with a #15 scalpel blade.  Given the location of the defect, shape of the defect and the proximity to free margins an island pedicle advancement flap was deemed most appropriate.  Using a sterile surgical marker, an appropriate advancement flap was drawn, based on the axial vessel mentioned above, incorporating the defect, outlining the appropriate donor tissue and placing the expected incisions within the relaxed skin tension lines where possible.    The area thus outlined was incised deep to adipose tissue with a #15 scalpel blade.  The skin margins were undermined to an appropriate distance in all directions around the primary defect and laterally outward around the island pedicle utilizing iris scissors.  There was minimal undermining beneath the pedicle flap.
Keystone Flap Text: The defect edges were debeveled with a #15 scalpel blade.  Given the location of the defect, shape of the defect a keystone flap was deemed most appropriate.  Using a sterile surgical marker, an appropriate keystone flap was drawn incorporating the defect, outlining the appropriate donor tissue and placing the expected incisions within the relaxed skin tension lines where possible. The area thus outlined was incised deep to adipose tissue with a #15 scalpel blade.  The skin margins were undermined to an appropriate distance in all directions around the primary defect and laterally outward around the flap utilizing iris scissors.
O-T Plasty Text: The defect edges were debeveled with a #15 scalpel blade.  Given the location of the defect, shape of the defect and the proximity to free margins an O-T plasty was deemed most appropriate.  Using a sterile surgical marker, an appropriate O-T plasty was drawn incorporating the defect and placing the expected incisions within the relaxed skin tension lines where possible.    The area thus outlined was incised deep to adipose tissue with a #15 scalpel blade.  The skin margins were undermined to an appropriate distance in all directions utilizing iris scissors.
O-Z Plasty Text: The defect edges were debeveled with a #15 scalpel blade.  Given the location of the defect, shape of the defect and the proximity to free margins an O-Z plasty (double transposition flap) was deemed most appropriate.  Using a sterile surgical marker, the appropriate transposition flaps were drawn incorporating the defect and placing the expected incisions within the relaxed skin tension lines where possible.    The area thus outlined was incised deep to adipose tissue with a #15 scalpel blade.  The skin margins were undermined to an appropriate distance in all directions utilizing iris scissors.  Hemostasis was achieved with electrocautery.  The flaps were then transposed into place, one clockwise and the other counterclockwise, and anchored with interrupted buried subcutaneous sutures.
Double O-Z Plasty Text: The defect edges were debeveled with a #15 scalpel blade.  Given the location of the defect, shape of the defect and the proximity to free margins a Double O-Z plasty (double transposition flap) was deemed most appropriate.  Using a sterile surgical marker, the appropriate transposition flaps were drawn incorporating the defect and placing the expected incisions within the relaxed skin tension lines where possible. The area thus outlined was incised deep to adipose tissue with a #15 scalpel blade.  The skin margins were undermined to an appropriate distance in all directions utilizing iris scissors.  Hemostasis was achieved with electrocautery.  The flaps were then transposed into place, one clockwise and the other counterclockwise, and anchored with interrupted buried subcutaneous sutures.
V-Y Plasty Text: The defect edges were debeveled with a #15 scalpel blade.  Given the location of the defect, shape of the defect and the proximity to free margins an V-Y advancement flap was deemed most appropriate.  Using a sterile surgical marker, an appropriate advancement flap was drawn incorporating the defect and placing the expected incisions within the relaxed skin tension lines where possible.    The area thus outlined was incised deep to adipose tissue with a #15 scalpel blade.  The skin margins were undermined to an appropriate distance in all directions utilizing iris scissors.
H Plasty Text: Given the location of the defect, shape of the defect and the proximity to free margins a H-plasty was deemed most appropriate for repair.  Using a sterile surgical marker, the appropriate advancement arms of the H-plasty were drawn incorporating the defect and placing the expected incisions within the relaxed skin tension lines where possible. The area thus outlined was incised deep to adipose tissue with a #15 scalpel blade. The skin margins were undermined to an appropriate distance in all directions utilizing iris scissors.  The opposing advancement arms were then advanced into place in opposite direction and anchored with interrupted buried subcutaneous sutures.
W Plasty Text: The lesion was extirpated to the level of the fat with a #15 scalpel blade.  Given the location of the defect, shape of the defect and the proximity to free margins a W-plasty was deemed most appropriate for repair.  Using a sterile surgical marker, the appropriate transposition arms of the W-plasty were drawn incorporating the defect and placing the expected incisions within the relaxed skin tension lines where possible.    The area thus outlined was incised deep to adipose tissue with a #15 scalpel blade.  The skin margins were undermined to an appropriate distance in all directions utilizing iris scissors.  The opposing transposition arms were then transposed into place in opposite direction and anchored with interrupted buried subcutaneous sutures.
Z Plasty Text: The lesion was extirpated to the level of the fat with a #15 scalpel blade.  Given the location of the defect, shape of the defect and the proximity to free margins a Z-plasty was deemed most appropriate for repair.  Using a sterile surgical marker, the appropriate transposition arms of the Z-plasty were drawn incorporating the defect and placing the expected incisions within the relaxed skin tension lines where possible.    The area thus outlined was incised deep to adipose tissue with a #15 scalpel blade.  The skin margins were undermined to an appropriate distance in all directions utilizing iris scissors.  The opposing transposition arms were then transposed into place in opposite direction and anchored with interrupted buried subcutaneous sutures.
Zygomaticofacial Flap Text: Given the location of the defect, shape of the defect and the proximity to free margins a zygomaticofacial flap was deemed most appropriate for repair.  Using a sterile surgical marker, the appropriate flap was drawn incorporating the defect and placing the expected incisions within the relaxed skin tension lines where possible. The area thus outlined was incised deep to adipose tissue with a #15 scalpel blade with preservation of a vascular pedicle.  The skin margins were undermined to an appropriate distance in all directions utilizing iris scissors.  The flap was then placed into the defect and anchored with interrupted buried subcutaneous sutures.
Cheek Interpolation Flap Text: A decision was made to reconstruct the defect utilizing an interpolation axial flap and a staged reconstruction.  A telfa template was made of the defect.  This telfa template was then used to outline the Cheek Interpolation flap.  The donor area for the pedicle flap was then injected with anesthesia.  The flap was excised through the skin and subcutaneous tissue down to the layer of the underlying musculature.  The interpolation flap was carefully excised within this deep plane to maintain its blood supply.  The edges of the donor site were undermined.   The donor site was closed in a primary fashion.  The pedicle was then rotated into position and sutured.  Once the tube was sutured into place, adequate blood supply was confirmed with blanching and refill.  The pedicle was then wrapped with xeroform gauze and dressed appropriately with a telfa and gauze bandage to ensure continued blood supply and protect the attached pedicle.
Cheek-To-Nose Interpolation Flap Text: A decision was made to reconstruct the defect utilizing an interpolation axial flap and a staged reconstruction.  A telfa template was made of the defect.  This telfa template was then used to outline the Cheek-To-Nose Interpolation flap.  The donor area for the pedicle flap was then injected with anesthesia.  The flap was excised through the skin and subcutaneous tissue down to the layer of the underlying musculature.  The interpolation flap was carefully excised within this deep plane to maintain its blood supply.  The edges of the donor site were undermined.   The donor site was closed in a primary fashion.  The pedicle was then rotated into position and sutured.  Once the tube was sutured into place, adequate blood supply was confirmed with blanching and refill.  The pedicle was then wrapped with xeroform gauze and dressed appropriately with a telfa and gauze bandage to ensure continued blood supply and protect the attached pedicle.
Interpolation Flap Text: A decision was made to reconstruct the defect utilizing an interpolation axial flap and a staged reconstruction.  A telfa template was made of the defect.  This telfa template was then used to outline the interpolation flap.  The donor area for the pedicle flap was then injected with anesthesia.  The flap was excised through the skin and subcutaneous tissue down to the layer of the underlying musculature.  The interpolation flap was carefully excised within this deep plane to maintain its blood supply.  The edges of the donor site were undermined.   The donor site was closed in a primary fashion.  The pedicle was then rotated into position and sutured.  Once the tube was sutured into place, adequate blood supply was confirmed with blanching and refill.  The pedicle was then wrapped with xeroform gauze and dressed appropriately with a telfa and gauze bandage to ensure continued blood supply and protect the attached pedicle.
Melolabial Interpolation Flap Text: A decision was made to reconstruct the defect utilizing an interpolation axial flap and a staged reconstruction.  A telfa template was made of the defect.  This telfa template was then used to outline the melolabial interpolation flap.  The donor area for the pedicle flap was then injected with anesthesia.  The flap was excised through the skin and subcutaneous tissue down to the layer of the underlying musculature.  The pedicle flap was carefully excised within this deep plane to maintain its blood supply.  The edges of the donor site were undermined.   The donor site was closed in a primary fashion.  The pedicle was then rotated into position and sutured.  Once the tube was sutured into place, adequate blood supply was confirmed with blanching and refill.  The pedicle was then wrapped with xeroform gauze and dressed appropriately with a telfa and gauze bandage to ensure continued blood supply and protect the attached pedicle.
Mastoid Interpolation Flap Text: A decision was made to reconstruct the defect utilizing an interpolation axial flap and a staged reconstruction.  A telfa template was made of the defect.  This telfa template was then used to outline the mastoid interpolation flap.  The donor area for the pedicle flap was then injected with anesthesia.  The flap was excised through the skin and subcutaneous tissue down to the layer of the underlying musculature.  The pedicle flap was carefully excised within this deep plane to maintain its blood supply.  The edges of the donor site were undermined.   The donor site was closed in a primary fashion.  The pedicle was then rotated into position and sutured.  Once the tube was sutured into place, adequate blood supply was confirmed with blanching and refill.  The pedicle was then wrapped with xeroform gauze and dressed appropriately with a telfa and gauze bandage to ensure continued blood supply and protect the attached pedicle.
Posterior Auricular Interpolation Flap Text: A decision was made to reconstruct the defect utilizing an interpolation axial flap and a staged reconstruction.  A telfa template was made of the defect.  This telfa template was then used to outline the posterior auricular interpolation flap.  The donor area for the pedicle flap was then injected with anesthesia.  The flap was excised through the skin and subcutaneous tissue down to the layer of the underlying musculature.  The pedicle flap was carefully excised within this deep plane to maintain its blood supply.  The edges of the donor site were undermined.   The donor site was closed in a primary fashion.  The pedicle was then rotated into position and sutured.  Once the tube was sutured into place, adequate blood supply was confirmed with blanching and refill.  The pedicle was then wrapped with xeroform gauze and dressed appropriately with a telfa and gauze bandage to ensure continued blood supply and protect the attached pedicle.
Paramedian Forehead Flap Text: A decision was made to reconstruct the defect utilizing an interpolation axial flap and a staged reconstruction.  A telfa template was made of the defect.  This telfa template was then used to outline the paramedian forehead pedicle flap.  The donor area for the pedicle flap was then injected with anesthesia.  The flap was excised through the skin and subcutaneous tissue down to the layer of the underlying musculature.  The pedicle flap was carefully excised within this deep plane to maintain its blood supply.  The edges of the donor site were undermined.   The donor site was closed in a primary fashion.  The pedicle was then rotated into position and sutured.  Once the tube was sutured into place, adequate blood supply was confirmed with blanching and refill.  The pedicle was then wrapped with xeroform gauze and dressed appropriately with a telfa and gauze bandage to ensure continued blood supply and protect the attached pedicle.
Lip Wedge Excision Repair Text: Given the location of the defect and the proximity to free margins a full thickness wedge repair was deemed most appropriate.  Using a sterile surgical marker, the appropriate repair was drawn incorporating the defect and placing the expected incisions perpendicular to the vermilion border.  The vermilion border was also meticulously outlined to ensure appropriate reapproximation during the repair.  The area thus outlined was incised through and through with a #15 scalpel blade.  The muscularis and dermis were reaproximated with deep sutures following hemostasis. Care was taken to realign the vermilion border before proceeding with the superficial closure.  Once the vermilion was realigned the superfical and mucosal closure was finished.
Ftsg Text: The defect edges were debeveled with a #15 scalpel blade.  Given the location of the defect, shape of the defect and the proximity to free margins a full thickness skin graft was deemed most appropriate.  Using a sterile surgical marker, the primary defect shape was transferred to the donor site. The area thus outlined was incised deep to adipose tissue with a #15 scalpel blade.  The harvested graft was then trimmed of adipose tissue until only dermis and epidermis was left.  The skin margins of the secondary defect were undermined to an appropriate distance in all directions utilizing iris scissors.  The secondary defect was closed with interrupted buried subcutaneous sutures.  The skin edges were then re-apposed with running  sutures.  The skin graft was then placed in the primary defect and oriented appropriately.
Split-Thickness Skin Graft Text: The defect edges were debeveled with a #15 scalpel blade.  Given the location of the defect, shape of the defect and the proximity to free margins a split thickness skin graft was deemed most appropriate.  Using a sterile surgical marker, the primary defect shape was transferred to the donor site. The split thickness graft was then harvested.  The skin graft was then placed in the primary defect and oriented appropriately.
Burow's Graft Text: The defect edges were debeveled with a #15 scalpel blade.  Given the location of the defect, shape of the defect, the proximity to free margins and the presence of a standing cone deformity a Burow's skin graft was deemed most appropriate. The standing cone was removed and this tissue was then trimmed to the shape of the primary defect. The adipose tissue was also removed until only dermis and epidermis were left.  The skin margins of the secondary defect were undermined to an appropriate distance in all directions utilizing iris scissors.  The secondary defect was closed with interrupted buried subcutaneous sutures.  The skin edges were then re-apposed with running  sutures.  The skin graft was then placed in the primary defect and oriented appropriately.
Cartilage Graft Text: The defect edges were debeveled with a #15 scalpel blade.  Given the location of the defect, shape of the defect, the fact the defect involved a full thickness cartilage defect a cartilage graft was deemed most appropriate.  An appropriate donor site was identified, cleansed, and anesthetized. The cartilage graft was then harvested and transferred to the recipient site, oriented appropriately and then sutured into place.  The secondary defect was then repaired using a primary closure.
Composite Graft Text: The defect edges were debeveled with a #15 scalpel blade.  Given the location of the defect, shape of the defect, the proximity to free margins and the fact the defect was full thickness a composite graft was deemed most appropriate.  The defect was outline and then transferred to the donor site.  A full thickness graft was then excised from the donor site. The graft was then placed in the primary defect, oriented appropriately and then sutured into place.  The secondary defect was then repaired using a primary closure.
Epidermal Autograft Text: The defect edges were debeveled with a #15 scalpel blade.  Given the location of the defect, shape of the defect and the proximity to free margins an epidermal autograft was deemed most appropriate.  Using a sterile surgical marker, the primary defect shape was transferred to the donor site. The epidermal graft was then harvested.  The skin graft was then placed in the primary defect and oriented appropriately.
Dermal Autograft Text: The defect edges were debeveled with a #15 scalpel blade.  Given the location of the defect, shape of the defect and the proximity to free margins a dermal autograft was deemed most appropriate.  Using a sterile surgical marker, the primary defect shape was transferred to the donor site. The area thus outlined was incised deep to adipose tissue with a #15 scalpel blade.  The harvested graft was then trimmed of adipose and epidermal tissue until only dermis was left.  The skin graft was then placed in the primary defect and oriented appropriately.
Skin Substitute Text: The defect edges were debeveled with a #15 scalpel blade.  Given the location of the defect, shape of the defect and the proximity to free margins a skin substitute graft was deemed most appropriate.  The graft material was trimmed to fit the size of the defect. The graft was then placed in the primary defect and oriented appropriately.
Tissue Cultured Epidermal Autograft Text: The defect edges were debeveled with a #15 scalpel blade.  Given the location of the defect, shape of the defect and the proximity to free margins a tissue cultured epidermal autograft was deemed most appropriate.  The graft was then trimmed to fit the size of the defect.  The graft was then placed in the primary defect and oriented appropriately.
Xenograft Text: The defect edges were debeveled with a #15 scalpel blade.  Given the location of the defect, shape of the defect and the proximity to free margins a xenograft was deemed most appropriate.  The graft was then trimmed to fit the size of the defect.  The graft was then placed in the primary defect and oriented appropriately.
Purse String (Intermediate) Text: Given the location of the defect and the characteristics of the surrounding skin a purse string intermediate closure was deemed most appropriate.  Undermining was performed circumfirentially around the surgical defect.  A purse string suture was then placed and tightened.
Purse String (Simple) Text: Given the location of the defect and the characteristics of the surrounding skin a purse string simple closure was deemed most appropriate.  Undermining was performed circumferentially around the surgical defect.  A purse string suture was then placed and tightened.
Partial Purse String (Intermediate) Text: Given the location of the defect and the characteristics of the surrounding skin an intermediate purse string closure was deemed most appropriate.  Undermining was performed circumferentially around the surgical defect.  A purse string suture was then placed and tightened. Wound tension of the circular defect prevented complete closure of the wound.
Partial Purse String (Simple) Text: Given the location of the defect and the characteristics of the surrounding skin a simple purse string closure was deemed most appropriate.  Undermining was performed circumferentially around the surgical defect.  A purse string suture was then placed and tightened. Wound tension of the circular defect prevented complete closure of the wound.
Complex Repair And Single Advancement Flap Text: The defect edges were debeveled with a #15 scalpel blade.  The primary defect was closed partially with a complex linear closure.  Given the location of the remaining defect, shape of the defect and the proximity to free margins a single advancement flap was deemed most appropriate for complete closure of the defect.  Using a sterile surgical marker, an appropriate advancement flap was drawn incorporating the defect and placing the expected incisions within the relaxed skin tension lines where possible.    The area thus outlined was incised deep to adipose tissue with a #15 scalpel blade.  The skin margins were undermined to an appropriate distance in all directions utilizing iris scissors.
Complex Repair And Double Advancement Flap Text: The defect edges were debeveled with a #15 scalpel blade.  The primary defect was closed partially with a complex linear closure.  Given the location of the remaining defect, shape of the defect and the proximity to free margins a double advancement flap was deemed most appropriate for complete closure of the defect.  Using a sterile surgical marker, an appropriate advancement flap was drawn incorporating the defect and placing the expected incisions within the relaxed skin tension lines where possible.    The area thus outlined was incised deep to adipose tissue with a #15 scalpel blade.  The skin margins were undermined to an appropriate distance in all directions utilizing iris scissors.
Complex Repair And Modified Advancement Flap Text: The defect edges were debeveled with a #15 scalpel blade.  The primary defect was closed partially with a complex linear closure.  Given the location of the remaining defect, shape of the defect and the proximity to free margins a modified advancement flap was deemed most appropriate for complete closure of the defect.  Using a sterile surgical marker, an appropriate advancement flap was drawn incorporating the defect and placing the expected incisions within the relaxed skin tension lines where possible.    The area thus outlined was incised deep to adipose tissue with a #15 scalpel blade.  The skin margins were undermined to an appropriate distance in all directions utilizing iris scissors.
Complex Repair And A-T Advancement Flap Text: The defect edges were debeveled with a #15 scalpel blade.  The primary defect was closed partially with a complex linear closure.  Given the location of the remaining defect, shape of the defect and the proximity to free margins an A-T advancement flap was deemed most appropriate for complete closure of the defect.  Using a sterile surgical marker, an appropriate advancement flap was drawn incorporating the defect and placing the expected incisions within the relaxed skin tension lines where possible.    The area thus outlined was incised deep to adipose tissue with a #15 scalpel blade.  The skin margins were undermined to an appropriate distance in all directions utilizing iris scissors.
Complex Repair And O-T Advancement Flap Text: The defect edges were debeveled with a #15 scalpel blade.  The primary defect was closed partially with a complex linear closure.  Given the location of the remaining defect, shape of the defect and the proximity to free margins an O-T advancement flap was deemed most appropriate for complete closure of the defect.  Using a sterile surgical marker, an appropriate advancement flap was drawn incorporating the defect and placing the expected incisions within the relaxed skin tension lines where possible.    The area thus outlined was incised deep to adipose tissue with a #15 scalpel blade.  The skin margins were undermined to an appropriate distance in all directions utilizing iris scissors.
Complex Repair And O-L Flap Text: The defect edges were debeveled with a #15 scalpel blade.  The primary defect was closed partially with a complex linear closure.  Given the location of the remaining defect, shape of the defect and the proximity to free margins an O-L flap was deemed most appropriate for complete closure of the defect.  Using a sterile surgical marker, an appropriate flap was drawn incorporating the defect and placing the expected incisions within the relaxed skin tension lines where possible.    The area thus outlined was incised deep to adipose tissue with a #15 scalpel blade.  The skin margins were undermined to an appropriate distance in all directions utilizing iris scissors.
Complex Repair And Bilobe Flap Text: The defect edges were debeveled with a #15 scalpel blade.  The primary defect was closed partially with a complex linear closure.  Given the location of the remaining defect, shape of the defect and the proximity to free margins a bilobe flap was deemed most appropriate for complete closure of the defect.  Using a sterile surgical marker, an appropriate advancement flap was drawn incorporating the defect and placing the expected incisions within the relaxed skin tension lines where possible.    The area thus outlined was incised deep to adipose tissue with a #15 scalpel blade.  The skin margins were undermined to an appropriate distance in all directions utilizing iris scissors.
Complex Repair And Melolabial Flap Text: The defect edges were debeveled with a #15 scalpel blade.  The primary defect was closed partially with a complex linear closure.  Given the location of the remaining defect, shape of the defect and the proximity to free margins a melolabial flap was deemed most appropriate for complete closure of the defect.  Using a sterile surgical marker, an appropriate advancement flap was drawn incorporating the defect and placing the expected incisions within the relaxed skin tension lines where possible.    The area thus outlined was incised deep to adipose tissue with a #15 scalpel blade.  The skin margins were undermined to an appropriate distance in all directions utilizing iris scissors.
Complex Repair And Rotation Flap Text: The defect edges were debeveled with a #15 scalpel blade.  The primary defect was closed partially with a complex linear closure.  Given the location of the remaining defect, shape of the defect and the proximity to free margins a rotation flap was deemed most appropriate for complete closure of the defect.  Using a sterile surgical marker, an appropriate advancement flap was drawn incorporating the defect and placing the expected incisions within the relaxed skin tension lines where possible.    The area thus outlined was incised deep to adipose tissue with a #15 scalpel blade.  The skin margins were undermined to an appropriate distance in all directions utilizing iris scissors.
Complex Repair And Rhombic Flap Text: The defect edges were debeveled with a #15 scalpel blade.  The primary defect was closed partially with a complex linear closure.  Given the location of the remaining defect, shape of the defect and the proximity to free margins a rhombic flap was deemed most appropriate for complete closure of the defect.  Using a sterile surgical marker, an appropriate advancement flap was drawn incorporating the defect and placing the expected incisions within the relaxed skin tension lines where possible.    The area thus outlined was incised deep to adipose tissue with a #15 scalpel blade.  The skin margins were undermined to an appropriate distance in all directions utilizing iris scissors.
Complex Repair And Transposition Flap Text: The defect edges were debeveled with a #15 scalpel blade.  The primary defect was closed partially with a complex linear closure.  Given the location of the remaining defect, shape of the defect and the proximity to free margins a transposition flap was deemed most appropriate for complete closure of the defect.  Using a sterile surgical marker, an appropriate advancement flap was drawn incorporating the defect and placing the expected incisions within the relaxed skin tension lines where possible.    The area thus outlined was incised deep to adipose tissue with a #15 scalpel blade.  The skin margins were undermined to an appropriate distance in all directions utilizing iris scissors.
Complex Repair And V-Y Plasty Text: The defect edges were debeveled with a #15 scalpel blade.  The primary defect was closed partially with a complex linear closure.  Given the location of the remaining defect, shape of the defect and the proximity to free margins a V-Y plasty was deemed most appropriate for complete closure of the defect.  Using a sterile surgical marker, an appropriate advancement flap was drawn incorporating the defect and placing the expected incisions within the relaxed skin tension lines where possible.    The area thus outlined was incised deep to adipose tissue with a #15 scalpel blade.  The skin margins were undermined to an appropriate distance in all directions utilizing iris scissors.
Complex Repair And M Plasty Text: The defect edges were debeveled with a #15 scalpel blade.  The primary defect was closed partially with a complex linear closure.  Given the location of the remaining defect, shape of the defect and the proximity to free margins an M plasty was deemed most appropriate for complete closure of the defect.  Using a sterile surgical marker, an appropriate advancement flap was drawn incorporating the defect and placing the expected incisions within the relaxed skin tension lines where possible.    The area thus outlined was incised deep to adipose tissue with a #15 scalpel blade.  The skin margins were undermined to an appropriate distance in all directions utilizing iris scissors.
Complex Repair And Double M Plasty Text: The defect edges were debeveled with a #15 scalpel blade.  The primary defect was closed partially with a complex linear closure.  Given the location of the remaining defect, shape of the defect and the proximity to free margins a double M plasty was deemed most appropriate for complete closure of the defect.  Using a sterile surgical marker, an appropriate advancement flap was drawn incorporating the defect and placing the expected incisions within the relaxed skin tension lines where possible.    The area thus outlined was incised deep to adipose tissue with a #15 scalpel blade.  The skin margins were undermined to an appropriate distance in all directions utilizing iris scissors.
Complex Repair And W Plasty Text: The defect edges were debeveled with a #15 scalpel blade.  The primary defect was closed partially with a complex linear closure.  Given the location of the remaining defect, shape of the defect and the proximity to free margins a W plasty was deemed most appropriate for complete closure of the defect.  Using a sterile surgical marker, an appropriate advancement flap was drawn incorporating the defect and placing the expected incisions within the relaxed skin tension lines where possible.    The area thus outlined was incised deep to adipose tissue with a #15 scalpel blade.  The skin margins were undermined to an appropriate distance in all directions utilizing iris scissors.
Complex Repair And Z Plasty Text: The defect edges were debeveled with a #15 scalpel blade.  The primary defect was closed partially with a complex linear closure.  Given the location of the remaining defect, shape of the defect and the proximity to free margins a Z plasty was deemed most appropriate for complete closure of the defect.  Using a sterile surgical marker, an appropriate advancement flap was drawn incorporating the defect and placing the expected incisions within the relaxed skin tension lines where possible.    The area thus outlined was incised deep to adipose tissue with a #15 scalpel blade.  The skin margins were undermined to an appropriate distance in all directions utilizing iris scissors.
Complex Repair And Dorsal Nasal Flap Text: The defect edges were debeveled with a #15 scalpel blade.  The primary defect was closed partially with a complex linear closure.  Given the location of the remaining defect, shape of the defect and the proximity to free margins a dorsal nasal flap was deemed most appropriate for complete closure of the defect.  Using a sterile surgical marker, an appropriate flap was drawn incorporating the defect and placing the expected incisions within the relaxed skin tension lines where possible.    The area thus outlined was incised deep to adipose tissue with a #15 scalpel blade.  The skin margins were undermined to an appropriate distance in all directions utilizing iris scissors.
Complex Repair And Ftsg Text: The defect edges were debeveled with a #15 scalpel blade.  The primary defect was closed partially with a complex linear closure.  Given the location of the defect, shape of the defect and the proximity to free margins a full thickness skin graft was deemed most appropriate to repair the remaining defect.  The graft was trimmed to fit the size of the remaining defect.  The graft was then placed in the primary defect, oriented appropriately, and sutured into place.
Complex Repair And Burow's Graft Text: The defect edges were debeveled with a #15 scalpel blade.  The primary defect was closed partially with a complex linear closure.  Given the location of the defect, shape of the defect, the proximity to free margins and the presence of a standing cone deformity a Burow's graft was deemed most appropriate to repair the remaining defect.  The graft was trimmed to fit the size of the remaining defect.  The graft was then placed in the primary defect, oriented appropriately, and sutured into place.
Complex Repair And Split-Thickness Skin Graft Text: The defect edges were debeveled with a #15 scalpel blade.  The primary defect was closed partially with a complex linear closure.  Given the location of the defect, shape of the defect and the proximity to free margins a split thickness skin graft was deemed most appropriate to repair the remaining defect.  The graft was trimmed to fit the size of the remaining defect.  The graft was then placed in the primary defect, oriented appropriately, and sutured into place.
Complex Repair And Epidermal Autograft Text: The defect edges were debeveled with a #15 scalpel blade.  The primary defect was closed partially with a complex linear closure.  Given the location of the defect, shape of the defect and the proximity to free margins an epidermal autograft was deemed most appropriate to repair the remaining defect.  The graft was trimmed to fit the size of the remaining defect.  The graft was then placed in the primary defect, oriented appropriately, and sutured into place.
Complex Repair And Dermal Autograft Text: The defect edges were debeveled with a #15 scalpel blade.  The primary defect was closed partially with a complex linear closure.  Given the location of the defect, shape of the defect and the proximity to free margins an dermal autograft was deemed most appropriate to repair the remaining defect.  The graft was trimmed to fit the size of the remaining defect.  The graft was then placed in the primary defect, oriented appropriately, and sutured into place.
Complex Repair And Tissue Cultured Epidermal Autograft Text: The defect edges were debeveled with a #15 scalpel blade.  The primary defect was closed partially with a complex linear closure.  Given the location of the defect, shape of the defect and the proximity to free margins an tissue cultured epidermal autograft was deemed most appropriate to repair the remaining defect.  The graft was trimmed to fit the size of the remaining defect.  The graft was then placed in the primary defect, oriented appropriately, and sutured into place.
Complex Repair And Xenograft Text: The defect edges were debeveled with a #15 scalpel blade.  The primary defect was closed partially with a complex linear closure.  Given the location of the defect, shape of the defect and the proximity to free margins a xenograft was deemed most appropriate to repair the remaining defect.  The graft was trimmed to fit the size of the remaining defect.  The graft was then placed in the primary defect, oriented appropriately, and sutured into place.
Complex Repair And Skin Substitute Graft Text: The defect edges were debeveled with a #15 scalpel blade.  The primary defect was closed partially with a complex linear closure.  Given the location of the remaining defect, shape of the defect and the proximity to free margins a skin substitute graft was deemed most appropriate to repair the remaining defect.  The graft was trimmed to fit the size of the remaining defect.  The graft was then placed in the primary defect, oriented appropriately, and sutured into place.
Path Notes (To The Dermatopathologist): Please check margins.
Consent was obtained from the patient. The risks and benefits to therapy were discussed in detail. Specifically, the risks of infection, scarring, bleeding, prolonged wound healing, incomplete removal, allergy to anesthesia, nerve injury and recurrence were addressed. Prior to the procedure, the treatment site was clearly identified and confirmed by the patient. All components of Universal Protocol/PAUSE Rule completed.
Post-Care Instructions: I reviewed with the patient in detail post-care instructions:\\n1. Apply bacitracin over the steri-strips.  \\n2. Cut non-stick pad (Telfa) to cover the steri-strips\\n3. Apply tape (hypafix) over the non-stick pad\\n4. Change once per day for 5 days\\n5. Shower with bandage on, change bandage after shower\\n\\nPatient is not to engage in any heavy lifting, exercise, hot tub, or swimming for the next 14 days. Should the patient develop any fevers, chills, bleeding, severe pain patient will contact the office immediately.
Home Suture Removal Text: Patient was provided a home suture removal kit and will remove their sutures at home.  If they have any questions or difficulties they will call the office.
Where Do You Want The Question To Include Opioid Counseling Located?: Case Summary Tab
Information: Selecting Yes will display possible errors in your note based on the variables you have selected. This validation is only offered as a suggestion for you. PLEASE NOTE THAT THE VALIDATION TEXT WILL BE REMOVED WHEN YOU FINALIZE YOUR NOTE. IF YOU WANT TO FAX A PRELIMINARY NOTE YOU WILL NEED TO TOGGLE THIS TO 'NO' IF YOU DO NOT WANT IT IN YOUR FAXED NOTE.

## 2020-07-20 NOTE — PROCEDURE: MIPS QUALITY
Quality 265: Biopsy Follow-Up: Biopsy results reviewed, communicated, tracked, and documented
Detail Level: Detailed
Quality 130: Documentation Of Current Medications In The Medical Record: Current Medications Documented
Quality 111:Pneumonia Vaccination Status For Older Adults: Pneumococcal Vaccination Previously Received
Quality 226: Preventive Care And Screening: Tobacco Use: Screening And Cessation Intervention: Patient screened for tobacco use and is an ex/non-smoker

## 2020-12-08 ENCOUNTER — APPOINTMENT (RX ONLY)
Dept: URBAN - METROPOLITAN AREA CLINIC 4 | Facility: CLINIC | Age: 75
Setting detail: DERMATOLOGY
End: 2020-12-08

## 2020-12-08 DIAGNOSIS — Z87.2 PERSONAL HISTORY OF DISEASES OF THE SKIN AND SUBCUTANEOUS TISSUE: ICD-10-CM

## 2020-12-08 DIAGNOSIS — L82.0 INFLAMED SEBORRHEIC KERATOSIS: ICD-10-CM

## 2020-12-08 DIAGNOSIS — Z71.89 OTHER SPECIFIED COUNSELING: ICD-10-CM

## 2020-12-08 DIAGNOSIS — Z85.828 PERSONAL HISTORY OF OTHER MALIGNANT NEOPLASM OF SKIN: ICD-10-CM

## 2020-12-08 PROBLEM — D48.5 NEOPLASM OF UNCERTAIN BEHAVIOR OF SKIN: Status: ACTIVE | Noted: 2020-12-08

## 2020-12-08 PROCEDURE — 11102 TANGNTL BX SKIN SINGLE LES: CPT

## 2020-12-08 PROCEDURE — 99213 OFFICE O/P EST LOW 20 MIN: CPT | Mod: 25

## 2020-12-08 PROCEDURE — ? COUNSELING

## 2020-12-08 PROCEDURE — ? BIOPSY BY SHAVE METHOD

## 2020-12-08 ASSESSMENT — LOCATION DETAILED DESCRIPTION DERM
LOCATION DETAILED: LEFT MEDIAL CANTHUS
LOCATION DETAILED: RIGHT POPLITEAL SKIN
LOCATION DETAILED: RIGHT LATERAL ABDOMEN

## 2020-12-08 ASSESSMENT — LOCATION SIMPLE DESCRIPTION DERM
LOCATION SIMPLE: LEFT EYELID
LOCATION SIMPLE: ABDOMEN
LOCATION SIMPLE: RIGHT POPLITEAL SKIN

## 2020-12-08 ASSESSMENT — LOCATION ZONE DERM
LOCATION ZONE: TRUNK
LOCATION ZONE: EYELID
LOCATION ZONE: LEG

## 2021-01-13 DIAGNOSIS — Z23 NEED FOR VACCINATION: ICD-10-CM

## 2021-01-21 ENCOUNTER — IMMUNIZATION (OUTPATIENT)
Dept: FAMILY PLANNING/WOMEN'S HEALTH CLINIC | Facility: IMMUNIZATION CENTER | Age: 76
End: 2021-01-21
Attending: INTERNAL MEDICINE
Payer: MEDICARE

## 2021-01-21 DIAGNOSIS — Z23 NEED FOR VACCINATION: ICD-10-CM

## 2021-01-21 DIAGNOSIS — Z23 ENCOUNTER FOR VACCINATION: Primary | ICD-10-CM

## 2021-01-21 PROCEDURE — 0001A PFIZER SARS-COV-2 VACCINE: CPT

## 2021-01-21 PROCEDURE — 91300 PFIZER SARS-COV-2 VACCINE: CPT

## 2021-02-11 ENCOUNTER — IMMUNIZATION (OUTPATIENT)
Dept: FAMILY PLANNING/WOMEN'S HEALTH CLINIC | Facility: IMMUNIZATION CENTER | Age: 76
End: 2021-02-11
Attending: INTERNAL MEDICINE
Payer: MEDICARE

## 2021-02-11 DIAGNOSIS — Z23 ENCOUNTER FOR VACCINATION: Primary | ICD-10-CM

## 2021-02-11 PROCEDURE — 0002A PFIZER SARS-COV-2 VACCINE: CPT

## 2021-02-11 PROCEDURE — 91300 PFIZER SARS-COV-2 VACCINE: CPT

## 2021-02-16 ENCOUNTER — OFFICE VISIT (OUTPATIENT)
Dept: RHEUMATOLOGY | Facility: MEDICAL CENTER | Age: 76
End: 2021-02-16
Payer: MEDICARE

## 2021-02-16 VITALS
SYSTOLIC BLOOD PRESSURE: 162 MMHG | RESPIRATION RATE: 14 BRPM | HEART RATE: 78 BPM | WEIGHT: 136 LBS | DIASTOLIC BLOOD PRESSURE: 80 MMHG | TEMPERATURE: 96.9 F | BODY MASS INDEX: 23.34 KG/M2 | OXYGEN SATURATION: 98 %

## 2021-02-16 DIAGNOSIS — M15.9 PRIMARY OSTEOARTHRITIS INVOLVING MULTIPLE JOINTS: ICD-10-CM

## 2021-02-16 DIAGNOSIS — M81.0 OSTEOPOROSIS, POST-MENOPAUSAL: ICD-10-CM

## 2021-02-16 DIAGNOSIS — E03.9 HYPOTHYROIDISM, UNSPECIFIED TYPE: ICD-10-CM

## 2021-02-16 PROCEDURE — 99214 OFFICE O/P EST MOD 30 MIN: CPT | Performed by: INTERNAL MEDICINE

## 2021-02-16 RX ORDER — CHLORAL HYDRATE 500 MG
4000 CAPSULE ORAL DAILY
COMMUNITY

## 2021-02-16 ASSESSMENT — FIBROSIS 4 INDEX: FIB4 SCORE: 1.506160190191773233

## 2021-02-16 NOTE — PROGRESS NOTES
Chief Complaint- joint pain     Subjective:   Sara Sandoval is a 75 y.o. female here today for follow up of rheumatological issues    This is a follow-up visit for this patient who we see in this clinic for osteoporosis.  Patient recently status post bone density scan June 2020 that does show T score of -2.5.  We discussed about starting up bisphosphonates but patient now status post over 10 years of bisphosphonate.  Patient here to discuss other options, we discussed other options including Prolia, Forteo and even possibly Evista.  Patient extremely hesitant about starting any medication and indecisive.  Of note patient did have a cousin that did have jaw necrosis from the Prolia.  Patient currently denies any fractures.  Of note vitamin D level in normal range.     Of note patient status post bilateral hip replacements and was told by her orthopedic surgeon that it was imperative that she keep her bones strong so as to maintain her bilateral hip replacements. Patient doesn't really have much complaints, is doing exercise programs to maintain her strength.     Bilateral CHLOE     S/p fosamax [id number]  S/p actonel until 2010     DEXA 1/2014 T scores -1.5, -3.0  DEXA 1/2016 T scores -2.0, -1.9     DEXA 3/20/2018 T scores -2.2, -2.0    DEXA 6/2/2020 T scores -2.5, -1.8  FRAX 6/2/2020 not done     Current medicines (including changes today)  Current Outpatient Medications   Medication Sig Dispense Refill   • Omega-3 Fatty Acids (FISH OIL) 1000 MG Cap capsule Take 1,000 mg by mouth 3 times a day, with meals.     • lisinopril (PRINIVIL) 20 MG Tab Take 20 mg by mouth every day.     • psyllium (METAMUCIL) 58.12 % PACK Take 1 Packet by mouth every day.     • levothyroxine (SYNTHROID) 50 MCG TABS Take 75 mcg by mouth every day.     • Cholecalciferol (VITAMIN D3) 2000 UNIT TABS Take  by mouth every day.     • Calcium-Magnesium-Vitamin D (CITRACAL CALCIUM+D PO) Take 1,000 mg by mouth every day.     • Multiple Vitamin  (MULTIVITAMIN PO) Take  by mouth every day.     • Lutein 20 MG TABS Take  by mouth every day.     • aspirin (ASA) 81 MG CHEW Take 81 mg by mouth every day.       • ibuprofen (MOTRIN) 800 MG TABS Take 800 mg by mouth as needed.       No current facility-administered medications for this visit.     She  has a past medical history of Arthritis, Pain (6/11/12), Shingles (2010), and Unspecified disorder of thyroid.    ROS   Other than what is mentioned in HPI or physical exam, there is no history of headaches, double vision or blurred vision. No temporal tenderness or jaw claudication. No trouble swallowing difficulties or sore throats.  No chest complaints including chest pain, cough or sputum production. No GI complaints including nausea, vomiting, change in bowel habits, or past peptic ulcer disease. No history of blood in the stools. No urinary complaints including dysuria or frequency. No history of alopecia, photosensitivity, oral ulcerations, Raynaud's phenomena.       Objective:     BP (!) 162/80   Pulse 78   Temp 36.1 °C (96.9 °F) (Temporal)   Resp 14   Wt 61.7 kg (136 lb)   SpO2 98%  Body mass index is 23.34 kg/m².   Physical Exam:    Constitutional: Alert and oriented X3, patient is talkative with good eye contact.Skin: Warm, dry, good turgor, no rashes in visible areas.Eye: Equal, round and reactive, conjunctiva clear, lids normal EOM intactENMT: Lips without lesions, good dentition, no oropharyngeal ulcers, moist buccal mucosa, pinna without deformityNeck: Trachea midline, no masses, no thyromegaly.Lymph:  No cervical lymphadenopathy, no axillary lymphadenopathy, no supraclavicular lymphadenopathyRespiratory: Unlabored respiratory effort, lungs clear to auscultation, no wheezes, no ronchi.Cardiovascular: Normal S1, S2, no murmur, no edema.Abdomen: Soft, non-tender, no masses, no hepatosplenomegaly.Psych: Alert and oriented x3, normal affect and mood.Neuro: Cranial nerves 2-12 are grossly intact, no loss  of sensation LEExt:no joint laxity noted in bilateral arms, no joint laxity noted in bilateral legs, did not see any dowagers hump, patient does have well-healed scars from bilateral THAs, some Heberden's nodes on DIP joints, gait without antalgia without foot drop    Lab Results   Component Value Date/Time    HEPCAB Negative 10/10/2017 07:06 AM     Lab Results   Component Value Date/Time    SODIUM 135 07/16/2020 11:12 AM    POTASSIUM 5.1 07/16/2020 11:12 AM    CHLORIDE 100 07/16/2020 11:12 AM    CO2 21 07/16/2020 11:12 AM    GLUCOSE 91 07/16/2020 11:12 AM    BUN 13 07/16/2020 11:12 AM    CREATININE 1.00 07/16/2020 11:12 AM      Lab Results   Component Value Date/Time    WBC 5.5 07/16/2020 11:12 AM    RBC 4.01 (L) 07/16/2020 11:12 AM    HEMOGLOBIN 12.8 07/16/2020 11:12 AM    HEMATOCRIT 39.3 07/16/2020 11:12 AM    MCV 98.0 (H) 07/16/2020 11:12 AM    MCH 31.9 07/16/2020 11:12 AM    MCHC 32.6 (L) 07/16/2020 11:12 AM    MPV 9.8 07/16/2020 11:12 AM    NEUTSPOLYS 48.40 07/16/2020 11:12 AM    LYMPHOCYTES 42.80 (H) 07/16/2020 11:12 AM    MONOCYTES 6.60 07/16/2020 11:12 AM    EOSINOPHILS 0.90 07/16/2020 11:12 AM    BASOPHILS 1.10 07/16/2020 11:12 AM      Lab Results   Component Value Date/Time    CALCIUM 9.5 07/16/2020 11:12 AM    ASTSGOT 20 07/16/2020 11:12 AM    ALTSGPT 15 07/16/2020 11:12 AM    ALKPHOSPHAT 58 07/16/2020 11:12 AM    TBILIRUBIN 0.4 07/16/2020 11:12 AM    ALBUMIN 4.4 07/16/2020 11:12 AM    TOTPROTEIN 7.1 07/16/2020 11:12 AM     Lab Results   Component Value Date/Time    SEDRATEWES 16 09/07/2018 02:39 PM     Lab Results   Component Value Date/Time    HBA1C 5.7 (H) 07/16/2020 11:12 AM     Lab Results   Component Value Date/Time    PTHINTACT 51.4 07/16/2020 11:12 AM     Results for orders placed during the hospital encounter of 06/02/20   DS-BONE DENSITY STUDY (DEXA)    Impression According to the World Health Organization classification, bone mineral density of this patient is osteoporosis with high risk of  fracture in the lumbar spine and osteopenia with increased risk of fracture in the left femur. Percentage decrease in bone   mineral density in the lumbar spine is statistically significant.        FRAX score not obtained for this patient.            INTERPRETING LOCATION:  [address] [place] [address], [id number]     Results for orders placed during the hospital encounter of 04/08/10   DX-KNEES-AP BILATERAL STANDING     Results for orders placed during the hospital encounter of 12/10/12   DX-PELVIS-1 OR 2 VIEWS    Impression Left hip arthroplasty appears within normal limits     Results for orders placed during the hospital encounter of 04/08/10   DX-KNEES-AP BILATERAL STANDING     Results for orders placed during the hospital encounter of 12/10/12   DX-PELVIS-1 OR 2 VIEWS    Impression Left hip arthroplasty appears within normal limits     Results for orders placed in visit on 04/03/14   DX-ANKLE 3+ VIEWS    Impression Soft tissue swelling. No acute fracture identified.     Assessment and Plan:     1. Osteoporosis, post-menopausal  Last DEXA June 2020, T score -2.5, long discussion with patient regarding treatment options we discussed Prolia, Forteo possibly Evista, we did print information about Evista for evaluation by the patient.  Patient currently hesitant about starting medications, indecisive, patient will ponder for now.   continue calcium citrate 1200 mg by mouth daily and vitamin D about 2000 units by mouth daily and magnesium 200 mg by mouth daily  - Comp Metabolic Panel; Future    2. Primary osteoarthritis involving multiple joints  Patient uses Tylenol as needed  - Comp Metabolic Panel; Future    3. Hypothyroidism, unspecified type  Can exacerbate joint pain, I recommend monitoring thyroid on a regular basis to assure it is not contributing to the patient's joint pain    Followup: Return in about 1 year (around 2/16/2022). or sooner prhua Sandoval  was seen 30 minutes face-to-face of which more  than 50% of the time was spent counseling the patient (excluding time for procedures)  regarding  rheumatological condition and care. Therapy was discussed in detail.      Please note that this dictation was created using voice recognition software. I have made every reasonable attempt to correct obvious errors, but I expect that there are errors of grammar and possibly content that I did not discover before finalizing the note.

## 2021-02-17 ENCOUNTER — APPOINTMENT (RX ONLY)
Dept: URBAN - METROPOLITAN AREA CLINIC 20 | Facility: CLINIC | Age: 76
Setting detail: DERMATOLOGY
End: 2021-02-17

## 2021-02-17 DIAGNOSIS — Z85.828 PERSONAL HISTORY OF OTHER MALIGNANT NEOPLASM OF SKIN: ICD-10-CM

## 2021-02-17 DIAGNOSIS — D22 MELANOCYTIC NEVI: ICD-10-CM

## 2021-02-17 DIAGNOSIS — Z71.89 OTHER SPECIFIED COUNSELING: ICD-10-CM

## 2021-02-17 DIAGNOSIS — D18.0 HEMANGIOMA: ICD-10-CM

## 2021-02-17 DIAGNOSIS — L82.1 OTHER SEBORRHEIC KERATOSIS: ICD-10-CM

## 2021-02-17 DIAGNOSIS — L81.4 OTHER MELANIN HYPERPIGMENTATION: ICD-10-CM

## 2021-02-17 PROBLEM — D22.5 MELANOCYTIC NEVI OF TRUNK: Status: ACTIVE | Noted: 2021-02-17

## 2021-02-17 PROBLEM — D22.72 MELANOCYTIC NEVI OF LEFT LOWER LIMB, INCLUDING HIP: Status: ACTIVE | Noted: 2021-02-17

## 2021-02-17 PROBLEM — D22.62 MELANOCYTIC NEVI OF LEFT UPPER LIMB, INCLUDING SHOULDER: Status: ACTIVE | Noted: 2021-02-17

## 2021-02-17 PROBLEM — D22.61 MELANOCYTIC NEVI OF RIGHT UPPER LIMB, INCLUDING SHOULDER: Status: ACTIVE | Noted: 2021-02-17

## 2021-02-17 PROBLEM — D18.01 HEMANGIOMA OF SKIN AND SUBCUTANEOUS TISSUE: Status: ACTIVE | Noted: 2021-02-17

## 2021-02-17 PROBLEM — D22.71 MELANOCYTIC NEVI OF RIGHT LOWER LIMB, INCLUDING HIP: Status: ACTIVE | Noted: 2021-02-17

## 2021-02-17 PROCEDURE — 99213 OFFICE O/P EST LOW 20 MIN: CPT

## 2021-02-17 PROCEDURE — ? COUNSELING

## 2021-02-17 ASSESSMENT — LOCATION SIMPLE DESCRIPTION DERM
LOCATION SIMPLE: LEFT THIGH
LOCATION SIMPLE: RIGHT THIGH
LOCATION SIMPLE: RIGHT POPLITEAL SKIN
LOCATION SIMPLE: RIGHT POSTERIOR THIGH
LOCATION SIMPLE: LEFT EYELID
LOCATION SIMPLE: RIGHT PRETIBIAL REGION
LOCATION SIMPLE: LEFT FOREARM
LOCATION SIMPLE: LEFT PRETIBIAL REGION
LOCATION SIMPLE: RIGHT FOREARM
LOCATION SIMPLE: LEFT UPPER BACK
LOCATION SIMPLE: LEFT LOWER BACK
LOCATION SIMPLE: LEFT UPPER ARM
LOCATION SIMPLE: CHEST
LOCATION SIMPLE: LEFT POSTERIOR THIGH
LOCATION SIMPLE: LEFT CHEEK
LOCATION SIMPLE: RIGHT UPPER ARM
LOCATION SIMPLE: RIGHT UPPER BACK
LOCATION SIMPLE: ABDOMEN

## 2021-02-17 ASSESSMENT — LOCATION DETAILED DESCRIPTION DERM
LOCATION DETAILED: LEFT PROXIMAL PRETIBIAL REGION
LOCATION DETAILED: RIGHT PROXIMAL PRETIBIAL REGION
LOCATION DETAILED: LEFT INFERIOR CENTRAL MALAR CHEEK
LOCATION DETAILED: LEFT MEDIAL SUPERIOR CHEST
LOCATION DETAILED: RIGHT POPLITEAL SKIN
LOCATION DETAILED: LEFT SUPERIOR MEDIAL MIDBACK
LOCATION DETAILED: LEFT PROXIMAL POSTERIOR THIGH
LOCATION DETAILED: RIGHT PROXIMAL POSTERIOR THIGH
LOCATION DETAILED: RIGHT ANTERIOR DISTAL THIGH
LOCATION DETAILED: RIGHT DISTAL POSTERIOR THIGH
LOCATION DETAILED: LEFT VENTRAL DISTAL FOREARM
LOCATION DETAILED: EPIGASTRIC SKIN
LOCATION DETAILED: RIGHT VENTRAL PROXIMAL FOREARM
LOCATION DETAILED: RIGHT VENTRAL DISTAL FOREARM
LOCATION DETAILED: RIGHT PROXIMAL DORSAL FOREARM
LOCATION DETAILED: RIGHT INFERIOR MEDIAL UPPER BACK
LOCATION DETAILED: LEFT DISTAL POSTERIOR THIGH
LOCATION DETAILED: LEFT MEDIAL CANTHUS
LOCATION DETAILED: LEFT PROXIMAL DORSAL FOREARM
LOCATION DETAILED: LEFT VENTRAL PROXIMAL FOREARM
LOCATION DETAILED: RIGHT ANTERIOR DISTAL UPPER ARM
LOCATION DETAILED: LEFT MID-UPPER BACK
LOCATION DETAILED: LEFT ANTERIOR DISTAL UPPER ARM
LOCATION DETAILED: LEFT ANTERIOR DISTAL THIGH

## 2021-02-17 ASSESSMENT — LOCATION ZONE DERM
LOCATION ZONE: LEG
LOCATION ZONE: FACE
LOCATION ZONE: TRUNK
LOCATION ZONE: EYELID
LOCATION ZONE: ARM

## 2021-07-26 NOTE — PROCEDURE: REASSURANCE
Detail Level: Simple
Additional Notes (Optional): Epithelial and spindle cell proliferation
Danette: COVID currently, fever, sore throat, cough. No increase in baseline shortness of breath. During the daytime, mostly sits and watches TV. Doesn't walk around. Even before COVID, never really walked around much. Room air resting oxygen saturation 95%.

## 2021-08-17 ENCOUNTER — APPOINTMENT (RX ONLY)
Dept: URBAN - METROPOLITAN AREA CLINIC 20 | Facility: CLINIC | Age: 76
Setting detail: DERMATOLOGY
End: 2021-08-17

## 2021-08-17 DIAGNOSIS — D22 MELANOCYTIC NEVI: ICD-10-CM

## 2021-08-17 DIAGNOSIS — L82.1 OTHER SEBORRHEIC KERATOSIS: ICD-10-CM

## 2021-08-17 DIAGNOSIS — D18.0 HEMANGIOMA: ICD-10-CM

## 2021-08-17 DIAGNOSIS — D69.2 OTHER NONTHROMBOCYTOPENIC PURPURA: ICD-10-CM

## 2021-08-17 DIAGNOSIS — L57.0 ACTINIC KERATOSIS: ICD-10-CM

## 2021-08-17 DIAGNOSIS — L81.4 OTHER MELANIN HYPERPIGMENTATION: ICD-10-CM

## 2021-08-17 DIAGNOSIS — Z71.89 OTHER SPECIFIED COUNSELING: ICD-10-CM

## 2021-08-17 DIAGNOSIS — Z85.828 PERSONAL HISTORY OF OTHER MALIGNANT NEOPLASM OF SKIN: ICD-10-CM

## 2021-08-17 PROBLEM — D22.72 MELANOCYTIC NEVI OF LEFT LOWER LIMB, INCLUDING HIP: Status: ACTIVE | Noted: 2021-08-17

## 2021-08-17 PROBLEM — D22.71 MELANOCYTIC NEVI OF RIGHT LOWER LIMB, INCLUDING HIP: Status: ACTIVE | Noted: 2021-08-17

## 2021-08-17 PROBLEM — D22.5 MELANOCYTIC NEVI OF TRUNK: Status: ACTIVE | Noted: 2021-08-17

## 2021-08-17 PROBLEM — D22.61 MELANOCYTIC NEVI OF RIGHT UPPER LIMB, INCLUDING SHOULDER: Status: ACTIVE | Noted: 2021-08-17

## 2021-08-17 PROBLEM — D22.62 MELANOCYTIC NEVI OF LEFT UPPER LIMB, INCLUDING SHOULDER: Status: ACTIVE | Noted: 2021-08-17

## 2021-08-17 PROBLEM — D18.01 HEMANGIOMA OF SKIN AND SUBCUTANEOUS TISSUE: Status: ACTIVE | Noted: 2021-08-17

## 2021-08-17 PROCEDURE — ? COUNSELING

## 2021-08-17 PROCEDURE — 17003 DESTRUCT PREMALG LES 2-14: CPT

## 2021-08-17 PROCEDURE — ? LIQUID NITROGEN

## 2021-08-17 PROCEDURE — 17000 DESTRUCT PREMALG LESION: CPT

## 2021-08-17 PROCEDURE — 99213 OFFICE O/P EST LOW 20 MIN: CPT | Mod: 25

## 2021-08-17 ASSESSMENT — LOCATION SIMPLE DESCRIPTION DERM
LOCATION SIMPLE: NOSE
LOCATION SIMPLE: LEFT POSTERIOR THIGH
LOCATION SIMPLE: RIGHT UPPER ARM
LOCATION SIMPLE: RIGHT FOREHEAD
LOCATION SIMPLE: LEFT FOREARM
LOCATION SIMPLE: LEFT EYELID
LOCATION SIMPLE: RIGHT PRETIBIAL REGION
LOCATION SIMPLE: LEFT LOWER BACK
LOCATION SIMPLE: LEFT THIGH
LOCATION SIMPLE: RIGHT UPPER BACK
LOCATION SIMPLE: RIGHT SCALP
LOCATION SIMPLE: ABDOMEN
LOCATION SIMPLE: LEFT UPPER ARM
LOCATION SIMPLE: RIGHT POPLITEAL SKIN
LOCATION SIMPLE: LEFT PRETIBIAL REGION
LOCATION SIMPLE: RIGHT POSTERIOR THIGH
LOCATION SIMPLE: LEFT CHEEK
LOCATION SIMPLE: RIGHT FOREARM
LOCATION SIMPLE: LEFT UPPER BACK
LOCATION SIMPLE: RIGHT THIGH
LOCATION SIMPLE: CHEST

## 2021-08-17 ASSESSMENT — LOCATION DETAILED DESCRIPTION DERM
LOCATION DETAILED: LEFT ANTERIOR DISTAL THIGH
LOCATION DETAILED: LEFT PROXIMAL POSTERIOR THIGH
LOCATION DETAILED: LEFT PROXIMAL DORSAL FOREARM
LOCATION DETAILED: EPIGASTRIC SKIN
LOCATION DETAILED: LEFT SUPERIOR MEDIAL MIDBACK
LOCATION DETAILED: RIGHT PROXIMAL POSTERIOR THIGH
LOCATION DETAILED: RIGHT ANTERIOR DISTAL UPPER ARM
LOCATION DETAILED: RIGHT DISTAL POSTERIOR THIGH
LOCATION DETAILED: RIGHT ANTERIOR DISTAL THIGH
LOCATION DETAILED: LEFT VENTRAL DISTAL FOREARM
LOCATION DETAILED: RIGHT VENTRAL DISTAL FOREARM
LOCATION DETAILED: RIGHT PROXIMAL PRETIBIAL REGION
LOCATION DETAILED: RIGHT POPLITEAL SKIN
LOCATION DETAILED: LEFT VENTRAL PROXIMAL FOREARM
LOCATION DETAILED: LEFT INFERIOR CENTRAL MALAR CHEEK
LOCATION DETAILED: RIGHT VENTRAL PROXIMAL FOREARM
LOCATION DETAILED: LEFT PROXIMAL PRETIBIAL REGION
LOCATION DETAILED: RIGHT PROXIMAL RADIAL DORSAL FOREARM
LOCATION DETAILED: LEFT DISTAL POSTERIOR THIGH
LOCATION DETAILED: RIGHT MEDIAL FRONTAL SCALP
LOCATION DETAILED: RIGHT SUPERIOR FOREHEAD
LOCATION DETAILED: LEFT MEDIAL SUPERIOR CHEST
LOCATION DETAILED: RIGHT PROXIMAL DORSAL FOREARM
LOCATION DETAILED: NASAL SUPRATIP
LOCATION DETAILED: LEFT MID-UPPER BACK
LOCATION DETAILED: LEFT MEDIAL CANTHUS
LOCATION DETAILED: LEFT ANTERIOR DISTAL UPPER ARM
LOCATION DETAILED: RIGHT INFERIOR MEDIAL UPPER BACK

## 2021-08-17 ASSESSMENT — LOCATION ZONE DERM
LOCATION ZONE: TRUNK
LOCATION ZONE: EYELID
LOCATION ZONE: NOSE
LOCATION ZONE: ARM
LOCATION ZONE: LEG
LOCATION ZONE: FACE
LOCATION ZONE: SCALP

## 2022-01-10 ENCOUNTER — HOSPITAL ENCOUNTER (OUTPATIENT)
Dept: RADIOLOGY | Facility: MEDICAL CENTER | Age: 77
End: 2022-01-10
Attending: FAMILY MEDICINE
Payer: MEDICARE

## 2022-01-10 DIAGNOSIS — Z12.31 VISIT FOR SCREENING MAMMOGRAM: ICD-10-CM

## 2022-01-10 PROCEDURE — 77063 BREAST TOMOSYNTHESIS BI: CPT

## 2022-01-26 ENCOUNTER — APPOINTMENT (RX ONLY)
Dept: URBAN - METROPOLITAN AREA CLINIC 6 | Facility: CLINIC | Age: 77
Setting detail: DERMATOLOGY
End: 2022-01-26

## 2022-01-26 DIAGNOSIS — Z71.89 OTHER SPECIFIED COUNSELING: ICD-10-CM

## 2022-01-26 DIAGNOSIS — D22 MELANOCYTIC NEVI: ICD-10-CM

## 2022-01-26 DIAGNOSIS — D18.0 HEMANGIOMA: ICD-10-CM

## 2022-01-26 DIAGNOSIS — L81.4 OTHER MELANIN HYPERPIGMENTATION: ICD-10-CM

## 2022-01-26 DIAGNOSIS — Z85.828 PERSONAL HISTORY OF OTHER MALIGNANT NEOPLASM OF SKIN: ICD-10-CM

## 2022-01-26 DIAGNOSIS — L82.1 OTHER SEBORRHEIC KERATOSIS: ICD-10-CM

## 2022-01-26 DIAGNOSIS — L20.89 OTHER ATOPIC DERMATITIS: ICD-10-CM

## 2022-01-26 DIAGNOSIS — Z00.8 ENCOUNTER FOR OTHER GENERAL EXAMINATION: ICD-10-CM

## 2022-01-26 DIAGNOSIS — L57.0 ACTINIC KERATOSIS: ICD-10-CM

## 2022-01-26 PROBLEM — D18.01 HEMANGIOMA OF SKIN AND SUBCUTANEOUS TISSUE: Status: ACTIVE | Noted: 2022-01-26

## 2022-01-26 PROBLEM — L20.84 INTRINSIC (ALLERGIC) ECZEMA: Status: ACTIVE | Noted: 2022-01-26

## 2022-01-26 PROBLEM — D22.5 MELANOCYTIC NEVI OF TRUNK: Status: ACTIVE | Noted: 2022-01-26

## 2022-01-26 PROCEDURE — ? LIQUID NITROGEN

## 2022-01-26 PROCEDURE — 17000 DESTRUCT PREMALG LESION: CPT

## 2022-01-26 PROCEDURE — 99213 OFFICE O/P EST LOW 20 MIN: CPT | Mod: 25

## 2022-01-26 PROCEDURE — ? COUNSELING

## 2022-01-26 PROCEDURE — 17003 DESTRUCT PREMALG LES 2-14: CPT

## 2022-01-26 PROCEDURE — ? REFERRAL CORRESPONDENCE

## 2022-01-26 ASSESSMENT — LOCATION DETAILED DESCRIPTION DERM
LOCATION DETAILED: SUBXIPHOID
LOCATION DETAILED: NASAL DORSUM
LOCATION DETAILED: LEFT MEDIAL CANTHUS
LOCATION DETAILED: LEFT CENTRAL LATERAL NECK
LOCATION DETAILED: LEFT INFERIOR CENTRAL MALAR CHEEK
LOCATION DETAILED: RIGHT MEDIAL BREAST 3-4:00 REGION
LOCATION DETAILED: LEFT PROXIMAL DORSAL FOREARM
LOCATION DETAILED: MIDDLE STERNUM
LOCATION DETAILED: RIGHT CENTRAL MALAR CHEEK
LOCATION DETAILED: EPIGASTRIC SKIN
LOCATION DETAILED: UPPER STERNUM

## 2022-01-26 ASSESSMENT — LOCATION SIMPLE DESCRIPTION DERM
LOCATION SIMPLE: RIGHT CHEEK
LOCATION SIMPLE: ABDOMEN
LOCATION SIMPLE: NECK
LOCATION SIMPLE: RIGHT BREAST
LOCATION SIMPLE: NOSE
LOCATION SIMPLE: LEFT EYELID
LOCATION SIMPLE: LEFT CHEEK
LOCATION SIMPLE: LEFT FOREARM
LOCATION SIMPLE: CHEST

## 2022-01-26 ASSESSMENT — LOCATION ZONE DERM
LOCATION ZONE: NECK
LOCATION ZONE: NOSE
LOCATION ZONE: FACE
LOCATION ZONE: TRUNK
LOCATION ZONE: EYELID
LOCATION ZONE: ARM

## 2022-01-26 NOTE — PROCEDURE: COUNSELING
Detail Level: Generalized
Detail Level: Detailed
Detail Level: Zone
Patient Specific Counseling (Will Not Stick From Patient To Patient): recommended OTC hydrocortisone 1% cream BID x 2-3 days\\nRTC if does not improve

## 2022-01-26 NOTE — PROCEDURE: LIQUID NITROGEN
Post-Care Instructions: I reviewed with the patient in detail post-care instructions. Patient is to wear sunprotection, and avoid picking at any of the treated lesions. Pt may apply Vaseline to crusted or scabbing areas.
Show Applicator Variable?: Yes
Duration Of Freeze Thaw-Cycle (Seconds): 5
Render Note In Bullet Format When Appropriate: No
Consent: The patient's consent was obtained including but not limited to risks of crusting, scabbing, blistering, scarring, darker or lighter pigmentary change, recurrence, incomplete removal and infection.
Detail Level: Detailed
Number Of Freeze-Thaw Cycles: 2 freeze-thaw cycles

## 2022-02-15 ENCOUNTER — OFFICE VISIT (OUTPATIENT)
Dept: RHEUMATOLOGY | Facility: MEDICAL CENTER | Age: 77
End: 2022-02-15
Payer: MEDICARE

## 2022-02-15 VITALS
WEIGHT: 137 LBS | HEART RATE: 78 BPM | BODY MASS INDEX: 23.52 KG/M2 | RESPIRATION RATE: 14 BRPM | TEMPERATURE: 96.9 F | SYSTOLIC BLOOD PRESSURE: 140 MMHG | OXYGEN SATURATION: 99 % | DIASTOLIC BLOOD PRESSURE: 80 MMHG

## 2022-02-15 DIAGNOSIS — E03.9 HYPOTHYROIDISM, UNSPECIFIED TYPE: ICD-10-CM

## 2022-02-15 DIAGNOSIS — M81.0 OSTEOPOROSIS, POST-MENOPAUSAL: ICD-10-CM

## 2022-02-15 DIAGNOSIS — M15.9 PRIMARY OSTEOARTHRITIS INVOLVING MULTIPLE JOINTS: ICD-10-CM

## 2022-02-15 PROCEDURE — 99213 OFFICE O/P EST LOW 20 MIN: CPT | Performed by: INTERNAL MEDICINE

## 2022-02-15 ASSESSMENT — FIBROSIS 4 INDEX: FIB4 SCORE: 1.53

## 2022-02-15 NOTE — PROGRESS NOTES
Chief Complaint- joint pain     Subjective:   Sara Sandoval is a 76 y.o. female here today for follow up of rheumatological issues      This is a follow-up visit for this patient who is seen in this clinic for osteoporosis.  Patient was last seen in this clinic about a year ago and at that time a bone density scan with a T score of -2.5.  At that time we talked extensively about treatment for osteoporosis and patient was reluctant.  Patient comes in today stating that she is due for a bone density scan in June 2022 and wonders about treatment options again.  Patient denies any fractures, denies any falls.  Patient walks 4 miles a day.  Patient does take calcium 1200 mg a day and vitamin D 2000 units a day.    Of note patient status post bilateral hip replacements and was told by her orthopedic surgeon that it was imperative that she keep her bones strong so as to maintain her bilateral hip replacements. Patient doesn't really have much complaints, is doing exercise programs to maintain her strength.     Bilateral CHLOE     S/p fosamax [id number]  S/p actonel until 2010     DEXA 1/2014 T scores -1.5, -3.0  DEXA 1/2016 T scores -2.0, -1.9     DEXA 3/20/2018 T scores -2.2, -2.0    DEXA 6/2/2020 T scores -2.5, -1.8  FRAX 6/2/2020 not done     Addendum 6/21/2022  DEXA 6/21/2022 T scores -2.8, -2.5  FRAX 6/21/2022 not done      Current Outpatient Medications   Medication Sig Dispense Refill   • Omega-3 Fatty Acids (FISH OIL) 1000 MG Cap capsule Take 1,000 mg by mouth 3 times a day, with meals.     • lisinopril (PRINIVIL) 20 MG Tab Take 20 mg by mouth every day.     • psyllium (METAMUCIL) 58.12 % PACK Take 1 Packet by mouth every day.     • levothyroxine (SYNTHROID) 50 MCG TABS Take 75 mcg by mouth every day.     • Cholecalciferol (VITAMIN D3) 2000 UNIT TABS Take  by mouth every day.     • Calcium-Magnesium-Vitamin D (CITRACAL CALCIUM+D PO) Take 1,000 mg by mouth every day.     • Multiple Vitamin (MULTIVITAMIN PO) Take   by mouth every day.     • Lutein 20 MG TABS Take  by mouth every day.     • aspirin (ASA) 81 MG CHEW Take 81 mg by mouth every day.       • ibuprofen (MOTRIN) 800 MG TABS Take 800 mg by mouth as needed.       No current facility-administered medications for this visit.     She  has a past medical history of Arthritis, Pain (6/11/12), Shingles (2010), and Unspecified disorder of thyroid.    ROS   Other than what is mentioned in HPI or physical exam, there is no history of headaches, double vision or blurred vision. No temporal tenderness or jaw claudication. No trouble swallowing difficulties or sore throats.  No chest complaints including chest pain, cough or sputum production. No GI complaints including nausea, vomiting, change in bowel habits, or past peptic ulcer disease. No history of blood in the stools. No urinary complaints including dysuria or frequency. No history of alopecia, photosensitivity, oral ulcerations, Raynaud's phenomena.       Objective:     /80   Pulse 78   Temp 36.1 °C (96.9 °F) (Temporal)   Resp 14   Wt 62.1 kg (137 lb)   SpO2 99%  Body mass index is 23.52 kg/m².   Physical Exam:    Constitutional: Alert and oriented X3, patient is talkative with good eye contact.Skin: Warm, dry, good turgor, no rashes in visible areas.Eye: Equal, round and reactive, conjunctiva clear, lids normal EOM intactENMT: Lips without lesions, good dentition, no oropharyngeal ulcers, moist buccal mucosa, pinna without deformityNeck: Trachea midline, no masses, no thyromegaly.Lymph:  No cervical lymphadenopathy, no axillary lymphadenopathy, no supraclavicular lymphadenopathyRespiratory: Unlabored respiratory effort, lungs clear to auscultation, no wheezes, no ronchi.Cardiovascular: Normal S1, S2, .Abdomen: Soft, non-tender, no masses, no hepatosplenomegaly.Psych: Alert and oriented x3, normal affect and mood.Neuro: Cranial nerves 2-12 are grossly intact, no loss of sensation LEExt:no joint laxity noted in  bilateral arms, no joint laxity noted in bilateral legs    Lab Results   Component Value Date/Time    HEPCAB Negative 10/10/2017 07:06 AM     Lab Results   Component Value Date/Time    SODIUM 135 07/16/2020 11:12 AM    POTASSIUM 5.1 07/16/2020 11:12 AM    CHLORIDE 100 07/16/2020 11:12 AM    CO2 21 07/16/2020 11:12 AM    GLUCOSE 91 07/16/2020 11:12 AM    BUN 13 07/16/2020 11:12 AM    CREATININE 1.00 07/16/2020 11:12 AM      Lab Results   Component Value Date/Time    WBC 5.5 07/16/2020 11:12 AM    RBC 4.01 (L) 07/16/2020 11:12 AM    HEMOGLOBIN 12.8 07/16/2020 11:12 AM    HEMATOCRIT 39.3 07/16/2020 11:12 AM    MCV 98.0 (H) 07/16/2020 11:12 AM    MCH 31.9 07/16/2020 11:12 AM    MCHC 32.6 (L) 07/16/2020 11:12 AM    MPV 9.8 07/16/2020 11:12 AM    NEUTSPOLYS 48.40 07/16/2020 11:12 AM    LYMPHOCYTES 42.80 (H) 07/16/2020 11:12 AM    MONOCYTES 6.60 07/16/2020 11:12 AM    EOSINOPHILS 0.90 07/16/2020 11:12 AM    BASOPHILS 1.10 07/16/2020 11:12 AM      Lab Results   Component Value Date/Time    CALCIUM 9.5 07/16/2020 11:12 AM    ASTSGOT 20 07/16/2020 11:12 AM    ALTSGPT 15 07/16/2020 11:12 AM    ALKPHOSPHAT 58 07/16/2020 11:12 AM    TBILIRUBIN 0.4 07/16/2020 11:12 AM    ALBUMIN 4.4 07/16/2020 11:12 AM    TOTPROTEIN 7.1 07/16/2020 11:12 AM     Lab Results   Component Value Date/Time    SEDRATEWES 16 09/07/2018 02:39 PM     Lab Results   Component Value Date/Time    HBA1C 5.7 (H) 07/16/2020 11:12 AM     Lab Results   Component Value Date/Time    PTHINTACT 51.4 07/16/2020 11:12 AM       Assessment and Plan:     1. Osteoporosis, post-menopausal  Last DEXA June 2020 Next DEXA due June 2022 already ordered by patient's PCP, will have patient follow-up after her bone density scan for evaluate for need of any osteoporotic treatment.   continue calcium citrate 1200 mg by mouth daily and vitamin D about 2000 units by mouth daily and magnesium 200 mg by mouth daily    2. Primary osteoarthritis involving multiple joints  Patient uses  Tylenol as needed    3. Hypothyroidism, unspecified type  Followed by patients PCP, can exacerbate joint pain, I recommend monitoring thyroid on a regular basis to assure it is not contributing to the patient's joint pain    Followup: Return in about 5 months (around 7/7/2022). or sooner jarett Sandoval  was seen 30 minutes face-to-face of which more than 50% of the time was spent counseling the patient (excluding time for procedures)  regarding  rheumatological condition and care. Therapy was discussed in detail.      Please note that this dictation was created using voice recognition software. I have made every reasonable attempt to correct obvious errors, but I expect that there are errors of grammar and possibly content that I did not discover before finalizing the note.

## 2022-05-25 ENCOUNTER — HOSPITAL ENCOUNTER (OUTPATIENT)
Dept: LAB | Facility: MEDICAL CENTER | Age: 77
End: 2022-05-25
Attending: FAMILY MEDICINE
Payer: MEDICARE

## 2022-05-25 PROCEDURE — 36415 COLL VENOUS BLD VENIPUNCTURE: CPT

## 2022-05-25 PROCEDURE — 80053 COMPREHEN METABOLIC PANEL: CPT

## 2022-05-26 LAB
ALBUMIN SERPL BCP-MCNC: 4.2 G/DL (ref 3.2–4.9)
ALBUMIN/GLOB SERPL: 1.8 G/DL
ALP SERPL-CCNC: 58 U/L (ref 30–99)
ALT SERPL-CCNC: 15 U/L (ref 2–50)
ANION GAP SERPL CALC-SCNC: 9 MMOL/L (ref 7–16)
AST SERPL-CCNC: 23 U/L (ref 12–45)
BILIRUB SERPL-MCNC: 0.3 MG/DL (ref 0.1–1.5)
BUN SERPL-MCNC: 17 MG/DL (ref 8–22)
CALCIUM SERPL-MCNC: 8.9 MG/DL (ref 8.5–10.5)
CHLORIDE SERPL-SCNC: 106 MMOL/L (ref 96–112)
CO2 SERPL-SCNC: 24 MMOL/L (ref 20–33)
CREAT SERPL-MCNC: 0.95 MG/DL (ref 0.5–1.4)
FASTING STATUS PATIENT QL REPORTED: NORMAL
GFR SERPLBLD CREATININE-BSD FMLA CKD-EPI: 61 ML/MIN/1.73 M 2
GLOBULIN SER CALC-MCNC: 2.4 G/DL (ref 1.9–3.5)
GLUCOSE SERPL-MCNC: 93 MG/DL (ref 65–99)
POTASSIUM SERPL-SCNC: 4.7 MMOL/L (ref 3.6–5.5)
PROT SERPL-MCNC: 6.6 G/DL (ref 6–8.2)
SODIUM SERPL-SCNC: 139 MMOL/L (ref 135–145)

## 2022-06-21 ENCOUNTER — HOSPITAL ENCOUNTER (OUTPATIENT)
Dept: RADIOLOGY | Facility: MEDICAL CENTER | Age: 77
End: 2022-06-21
Attending: FAMILY MEDICINE
Payer: MEDICARE

## 2022-06-21 DIAGNOSIS — M81.0 SENILE OSTEOPOROSIS: ICD-10-CM

## 2022-06-21 DIAGNOSIS — N95.9 POSTMENOPAUSAL SYMPTOMS: ICD-10-CM

## 2022-06-21 PROCEDURE — 77080 DXA BONE DENSITY AXIAL: CPT

## 2022-07-12 ENCOUNTER — OFFICE VISIT (OUTPATIENT)
Dept: RHEUMATOLOGY | Facility: MEDICAL CENTER | Age: 77
End: 2022-07-12
Payer: MEDICARE

## 2022-07-12 VITALS
HEART RATE: 72 BPM | WEIGHT: 131 LBS | RESPIRATION RATE: 12 BRPM | SYSTOLIC BLOOD PRESSURE: 130 MMHG | OXYGEN SATURATION: 97 % | BODY MASS INDEX: 22.49 KG/M2 | TEMPERATURE: 97.5 F | DIASTOLIC BLOOD PRESSURE: 60 MMHG

## 2022-07-12 DIAGNOSIS — Z79.899 ENCOUNTER FOR MONITORING DENOSUMAB THERAPY: ICD-10-CM

## 2022-07-12 DIAGNOSIS — Z51.81 ENCOUNTER FOR MONITORING DENOSUMAB THERAPY: ICD-10-CM

## 2022-07-12 DIAGNOSIS — M15.9 PRIMARY OSTEOARTHRITIS INVOLVING MULTIPLE JOINTS: ICD-10-CM

## 2022-07-12 DIAGNOSIS — E03.9 HYPOTHYROIDISM, UNSPECIFIED TYPE: ICD-10-CM

## 2022-07-12 DIAGNOSIS — M81.0 OSTEOPOROSIS, POST-MENOPAUSAL: ICD-10-CM

## 2022-07-12 PROCEDURE — 99214 OFFICE O/P EST MOD 30 MIN: CPT | Performed by: INTERNAL MEDICINE

## 2022-07-12 RX ORDER — METHYLPREDNISOLONE SODIUM SUCCINATE 125 MG/2ML
125 INJECTION, POWDER, LYOPHILIZED, FOR SOLUTION INTRAMUSCULAR; INTRAVENOUS PRN
Status: CANCELLED | OUTPATIENT
Start: 2022-07-12

## 2022-07-12 RX ORDER — EPINEPHRINE 1 MG/ML(1)
0.5 AMPUL (ML) INJECTION PRN
Status: CANCELLED | OUTPATIENT
Start: 2022-07-12

## 2022-07-12 RX ORDER — DIPHENHYDRAMINE HYDROCHLORIDE 50 MG/ML
50 INJECTION INTRAMUSCULAR; INTRAVENOUS PRN
Status: CANCELLED | OUTPATIENT
Start: 2022-07-12

## 2022-07-12 ASSESSMENT — FIBROSIS 4 INDEX: FIB4 SCORE: 1.75

## 2022-07-12 NOTE — PROGRESS NOTES
Chief Complaint- joint pain     Subjective:   Sara Sandoval is a 76 y.o. female here today for follow up of rheumatological issues    This is a follow-up visit for this patient who we see in this clinic for osteoporosis.  Patient recently status post bone density scan June 2022 that does show osteoporosis by T-scores.  Patient here to see about starting osteoporosis medication.  Patient already status post over 5 years of Fosamax and Actonel as well.  At last visit we had talked extensively about Prolia patient is interested in starting Prolia now.  Patient's not had any fractures since the last visit.     Of note patient status post bilateral hip replacements and was told by her orthopedic surgeon that it was imperative that she keep her bones strong so as to maintain her bilateral hip replacements. Patient doesn't really have much complaints, is doing exercise programs to maintain her strength.    Since last visit patient's  committed suicide about 4 months ago, patient undergoing grieving process, patient states she has a plethora of support with friends and family.     Bilateral CHLOE     S/p fosamax [id number]  S/p actonel until 2010     DEXA 1/2014 T scores -1.5, -3.0  DEXA 1/2016 T scores -2.0, -1.9     DEXA 3/20/2018 T scores -2.2, -2.0    DEXA 6/2/2020 T scores -2.5, -1.8  FRAX 6/2/2020 not done   DEXA 6/21/2022 T scores -2.8, -2.5  FRAX 6/21/2022 not done    Current Outpatient Medications   Medication Sig Dispense Refill   • Omega-3 Fatty Acids (FISH OIL) 1000 MG Cap capsule Take 1,000 mg by mouth 3 times a day, with meals.     • lisinopril (PRINIVIL) 20 MG Tab Take 20 mg by mouth every day.     • psyllium (METAMUCIL) 58.12 % PACK Take 1 Packet by mouth every day.     • levothyroxine (SYNTHROID) 50 MCG TABS Take 75 mcg by mouth every day.     • Cholecalciferol (VITAMIN D3) 2000 UNIT TABS Take  by mouth every day.     • Calcium-Magnesium-Vitamin D (CITRACAL CALCIUM+D PO) Take 1,000 mg by mouth  every day.     • Multiple Vitamin (MULTIVITAMIN PO) Take  by mouth every day.     • Lutein 20 MG TABS Take  by mouth every day.     • aspirin (ASA) 81 MG CHEW Take 81 mg by mouth every day.       • ibuprofen (MOTRIN) 800 MG TABS Take 800 mg by mouth as needed.       No current facility-administered medications for this visit.     She  has a past medical history of Arthritis, Pain (6/11/12), Shingles (2010), and Unspecified disorder of thyroid.    ROS   Other than what is mentioned in HPI or physical exam, there is no history of headaches, double vision or blurred vision. No temporal tenderness or jaw claudication. No trouble swallowing difficulties or sore throats.  No chest complaints including chest pain, cough or sputum production. No GI complaints including nausea, vomiting, change in bowel habits, or past peptic ulcer disease. No history of blood in the stools. No urinary complaints including dysuria or frequency. No history of alopecia, photosensitivity, oral ulcerations, Raynaud's phenomena.       Objective:     /60   Pulse 72   Temp 36.4 °C (97.5 °F) (Temporal)   Resp 12   Wt 59.4 kg (131 lb)   SpO2 97%  Body mass index is 22.49 kg/m².   Physical Exam:    Constitutional: Alert and oriented X3, patient is talkative with good eye contact.Skin: Warm, dry, good turgor, no rashes in visible areas.Eye: Equal, round and reactive, conjunctiva clear, lids normal EOM intactENMT: Lips without lesions, good dentition, no oropharyngeal ulcers, moist buccal mucosa, pinna without deformityNeck: Trachea midline, no masses, no thyromegaly.Lymph:  No cervical lymphadenopathy, no axillary lymphadenopathy, no supraclavicular lymphadenopathyRespiratory: Unlabored respiratory effort, lungs clear to auscultation, no wheezes, no ronchi.Cardiovascular: Normal S1, S2, .Abdomen: Soft, non-tender, no masses, no hepatosplenomegaly.Psych: Alert and oriented x3, normal affect and mood.Neuro: Cranial nerves 2-12 are grossly  intact, no loss of sensation LEExt:no joint laxity noted in bilateral arms, no joint laxity noted in bilateral legs, no dowagers hump,    Lab Results   Component Value Date/Time    HEPCAB Negative 10/10/2017 07:06 AM     Lab Results   Component Value Date/Time    SODIUM 139 05/25/2022 11:31 AM    POTASSIUM 4.7 05/25/2022 11:31 AM    CHLORIDE 106 05/25/2022 11:31 AM    CO2 24 05/25/2022 11:31 AM    GLUCOSE 93 05/25/2022 11:31 AM    BUN 17 05/25/2022 11:31 AM    CREATININE 0.95 05/25/2022 11:31 AM      Lab Results   Component Value Date/Time    WBC 5.5 07/16/2020 11:12 AM    RBC 4.01 (L) 07/16/2020 11:12 AM    HEMOGLOBIN 12.8 07/16/2020 11:12 AM    HEMATOCRIT 39.3 07/16/2020 11:12 AM    MCV 98.0 (H) 07/16/2020 11:12 AM    MCH 31.9 07/16/2020 11:12 AM    MCHC 32.6 (L) 07/16/2020 11:12 AM    MPV 9.8 07/16/2020 11:12 AM    NEUTSPOLYS 48.40 07/16/2020 11:12 AM    LYMPHOCYTES 42.80 (H) 07/16/2020 11:12 AM    MONOCYTES 6.60 07/16/2020 11:12 AM    EOSINOPHILS 0.90 07/16/2020 11:12 AM    BASOPHILS 1.10 07/16/2020 11:12 AM      Lab Results   Component Value Date/Time    CALCIUM 8.9 05/25/2022 11:31 AM    ASTSGOT 23 05/25/2022 11:31 AM    ALTSGPT 15 05/25/2022 11:31 AM    ALKPHOSPHAT 58 05/25/2022 11:31 AM    TBILIRUBIN 0.3 05/25/2022 11:31 AM    ALBUMIN 4.2 05/25/2022 11:31 AM    TOTPROTEIN 6.6 05/25/2022 11:31 AM     Lab Results   Component Value Date/Time    SEDRATEWES 16 09/07/2018 02:39 PM     Lab Results   Component Value Date/Time    HBA1C 5.7 (H) 07/16/2020 11:12 AM     Lab Results   Component Value Date/Time    PTHINTACT 51.4 07/16/2020 11:12 AM       Assessment and Plan:     1. Osteoporosis, post-menopausal  As seen by patient's bone density scan done in June 2022, long discussion with patient regarding treatment options we opted to start Prolia 60 mg subcu every 6 months of note patient already status post 5 years of Fosamax and 5 years of Actonel.  We will check bone density scan in June 2024.    2. Encounter for  monitoring denosumab therapy  Start Prolia 60 mg subcu every 6 months    3. Primary osteoarthritis involving multiple joints  Patient uses Tylenol as needed    4. Hypothyroidism, unspecified type  Followed by patients PCP, can exacerbate joint pain, I recommend monitoring thyroid on a regular basis to assure it is not contributing to the patient's joint pain    Followup: Return in about 1 year (around 7/12/2023). or sooner jarett Sandoval  was seen 30 minutes face-to-face of which more than 50% of the time was spent counseling the patient (excluding time for procedures)  regarding  rheumatological condition and care. Therapy was discussed in detail.      Please note that this dictation was created using voice recognition software. I have made every reasonable attempt to correct obvious errors, but I expect that there are errors of grammar and possibly content that I did not discover before finalizing the note.

## 2022-07-19 ENCOUNTER — TELEPHONE (OUTPATIENT)
Dept: RHEUMATOLOGY | Facility: MEDICAL CENTER | Age: 77
End: 2022-07-19
Payer: MEDICARE

## 2022-07-19 NOTE — TELEPHONE ENCOUNTER
Sara called and wanted to clarify how much Calcium and Vit D she should be taking a day?     Please advise and thank you

## 2022-07-27 ENCOUNTER — APPOINTMENT (RX ONLY)
Dept: URBAN - METROPOLITAN AREA CLINIC 6 | Facility: CLINIC | Age: 77
Setting detail: DERMATOLOGY
End: 2022-07-27

## 2022-07-27 DIAGNOSIS — L82.1 OTHER SEBORRHEIC KERATOSIS: ICD-10-CM

## 2022-07-27 DIAGNOSIS — L57.0 ACTINIC KERATOSIS: ICD-10-CM

## 2022-07-27 DIAGNOSIS — Z71.89 OTHER SPECIFIED COUNSELING: ICD-10-CM

## 2022-07-27 DIAGNOSIS — L81.4 OTHER MELANIN HYPERPIGMENTATION: ICD-10-CM

## 2022-07-27 DIAGNOSIS — Z85.828 PERSONAL HISTORY OF OTHER MALIGNANT NEOPLASM OF SKIN: ICD-10-CM

## 2022-07-27 DIAGNOSIS — D22 MELANOCYTIC NEVI: ICD-10-CM

## 2022-07-27 DIAGNOSIS — D18.0 HEMANGIOMA: ICD-10-CM

## 2022-07-27 PROBLEM — D22.5 MELANOCYTIC NEVI OF TRUNK: Status: ACTIVE | Noted: 2022-07-27

## 2022-07-27 PROBLEM — D18.01 HEMANGIOMA OF SKIN AND SUBCUTANEOUS TISSUE: Status: ACTIVE | Noted: 2022-07-27

## 2022-07-27 PROCEDURE — ? SUNSCREEN TREATMENT REGIMEN

## 2022-07-27 PROCEDURE — 17000 DESTRUCT PREMALG LESION: CPT

## 2022-07-27 PROCEDURE — ? LIQUID NITROGEN

## 2022-07-27 PROCEDURE — ? SUNSCREEN RECOMMENDATIONS

## 2022-07-27 PROCEDURE — 17003 DESTRUCT PREMALG LES 2-14: CPT

## 2022-07-27 PROCEDURE — 99213 OFFICE O/P EST LOW 20 MIN: CPT | Mod: 25

## 2022-07-27 PROCEDURE — ? COUNSELING

## 2022-07-27 ASSESSMENT — LOCATION DETAILED DESCRIPTION DERM
LOCATION DETAILED: LEFT DISTAL RADIAL DORSAL FOREARM
LOCATION DETAILED: SUBXIPHOID
LOCATION DETAILED: MIDDLE STERNUM
LOCATION DETAILED: LEFT RADIAL DORSAL HAND
LOCATION DETAILED: LEFT MEDIAL CANTHUS
LOCATION DETAILED: UPPER STERNUM
LOCATION DETAILED: RIGHT RADIAL DORSAL HAND
LOCATION DETAILED: LEFT DORSAL WRIST
LOCATION DETAILED: EPIGASTRIC SKIN
LOCATION DETAILED: LEFT DISTAL DORSAL FOREARM
LOCATION DETAILED: RIGHT VENTRAL DISTAL FOREARM
LOCATION DETAILED: NASAL DORSUM
LOCATION DETAILED: RIGHT PROXIMAL RADIAL DORSAL FOREARM
LOCATION DETAILED: RIGHT DISTAL DORSAL FOREARM
LOCATION DETAILED: LEFT PROXIMAL DORSAL FOREARM

## 2022-07-27 ASSESSMENT — LOCATION SIMPLE DESCRIPTION DERM
LOCATION SIMPLE: LEFT HAND
LOCATION SIMPLE: NOSE
LOCATION SIMPLE: LEFT FOREARM
LOCATION SIMPLE: RIGHT FOREARM
LOCATION SIMPLE: CHEST
LOCATION SIMPLE: LEFT WRIST
LOCATION SIMPLE: ABDOMEN
LOCATION SIMPLE: LEFT EYELID
LOCATION SIMPLE: RIGHT HAND

## 2022-07-27 ASSESSMENT — LOCATION ZONE DERM
LOCATION ZONE: TRUNK
LOCATION ZONE: NOSE
LOCATION ZONE: EYELID
LOCATION ZONE: HAND
LOCATION ZONE: ARM

## 2022-07-27 NOTE — PROCEDURE: LIQUID NITROGEN
Number Of Freeze-Thaw Cycles: 2 freeze-thaw cycles
Detail Level: Detailed
Consent: The patient's consent was obtained including but not limited to risks of crusting, scabbing, blistering, scarring, darker or lighter pigmentary change, recurrence, incomplete removal and infection.
Show Aperture Variable?: Yes
Render Post-Care Instructions In Note?: no
Duration Of Freeze Thaw-Cycle (Seconds): 10
Post-Care Instructions: I reviewed with the patient in detail post-care instructions. Patient is to wear sunprotection, and avoid picking at any of the treated lesions. Pt may apply Vaseline to crusted or scabbing areas.

## 2022-08-01 ENCOUNTER — OUTPATIENT INFUSION SERVICES (OUTPATIENT)
Dept: ONCOLOGY | Facility: MEDICAL CENTER | Age: 77
End: 2022-08-01
Attending: INTERNAL MEDICINE
Payer: MEDICARE

## 2022-08-01 VITALS
BODY MASS INDEX: 23.2 KG/M2 | OXYGEN SATURATION: 96 % | HEIGHT: 63 IN | SYSTOLIC BLOOD PRESSURE: 153 MMHG | TEMPERATURE: 97.7 F | HEART RATE: 71 BPM | WEIGHT: 130.95 LBS | DIASTOLIC BLOOD PRESSURE: 81 MMHG | RESPIRATION RATE: 18 BRPM

## 2022-08-01 DIAGNOSIS — M81.0 OSTEOPOROSIS, POST-MENOPAUSAL: ICD-10-CM

## 2022-08-01 LAB
CA-I BLD ISE-SCNC: 1.23 MMOL/L (ref 1.1–1.3)
CREAT BLD-MCNC: 0.9 MG/DL (ref 0.5–1.4)

## 2022-08-01 PROCEDURE — 96372 THER/PROPH/DIAG INJ SC/IM: CPT

## 2022-08-01 PROCEDURE — 82565 ASSAY OF CREATININE: CPT

## 2022-08-01 PROCEDURE — 82330 ASSAY OF CALCIUM: CPT

## 2022-08-01 PROCEDURE — 36415 COLL VENOUS BLD VENIPUNCTURE: CPT

## 2022-08-01 PROCEDURE — 700111 HCHG RX REV CODE 636 W/ 250 OVERRIDE (IP): Mod: JG | Performed by: INTERNAL MEDICINE

## 2022-08-01 RX ORDER — UBIDECARENONE 200 MG
200 CAPSULE ORAL DAILY
COMMUNITY

## 2022-08-01 RX ORDER — MAGNESIUM OXIDE 400 MG/1
400 TABLET ORAL DAILY
COMMUNITY
End: 2023-03-21

## 2022-08-01 RX ORDER — IBUPROFEN 200 MG
1000 CAPSULE ORAL DAILY
COMMUNITY
End: 2023-03-21

## 2022-08-01 RX ORDER — EPINEPHRINE 1 MG/ML(1)
0.5 AMPUL (ML) INJECTION PRN
Status: CANCELLED | OUTPATIENT
Start: 2023-01-28

## 2022-08-01 RX ORDER — METHYLPREDNISOLONE SODIUM SUCCINATE 125 MG/2ML
125 INJECTION, POWDER, LYOPHILIZED, FOR SOLUTION INTRAMUSCULAR; INTRAVENOUS PRN
Status: CANCELLED | OUTPATIENT
Start: 2023-01-28

## 2022-08-01 RX ORDER — DIPHENHYDRAMINE HYDROCHLORIDE 50 MG/ML
50 INJECTION INTRAMUSCULAR; INTRAVENOUS PRN
Status: CANCELLED | OUTPATIENT
Start: 2023-01-28

## 2022-08-01 RX ADMIN — DENOSUMAB 60 MG: 60 INJECTION SUBCUTANEOUS at 14:25

## 2022-08-01 NOTE — PROGRESS NOTES
Pt presents to [facility] for prolia injection. Pt denies any oral sx in the last 6 weeks and is taking calcium and vitamin D; education provided and verbalized understanding. Labs drawn from [facility]; brisk blood return observed and pt tolerated well. Labs within treatable parameters. Prolia injected into back of left arm with no s/s of adverse reactions. Next appointment to be scheduled for 6 months out, education provided regarding Prolia. Pt discharged to self care with all personal belongings and in NAD.

## 2022-08-30 ENCOUNTER — HOSPITAL ENCOUNTER (OUTPATIENT)
Dept: LAB | Facility: MEDICAL CENTER | Age: 77
End: 2022-08-30
Attending: FAMILY MEDICINE
Payer: MEDICARE

## 2022-08-30 LAB
CHOLEST SERPL-MCNC: 197 MG/DL (ref 100–199)
FASTING STATUS PATIENT QL REPORTED: NORMAL
HDLC SERPL-MCNC: 69 MG/DL
LDLC SERPL CALC-MCNC: 114 MG/DL
T3FREE SERPL-MCNC: 2.05 PG/ML (ref 2–4.4)
T4 FREE SERPL-MCNC: 1.26 NG/DL (ref 0.93–1.7)
TRIGL SERPL-MCNC: 69 MG/DL (ref 0–149)
TSH SERPL DL<=0.005 MIU/L-ACNC: 0.98 UIU/ML (ref 0.38–5.33)

## 2022-08-30 PROCEDURE — 84443 ASSAY THYROID STIM HORMONE: CPT

## 2022-08-30 PROCEDURE — 84439 ASSAY OF FREE THYROXINE: CPT

## 2022-08-30 PROCEDURE — 80061 LIPID PANEL: CPT

## 2022-08-30 PROCEDURE — 36415 COLL VENOUS BLD VENIPUNCTURE: CPT

## 2022-08-30 PROCEDURE — 84481 FREE ASSAY (FT-3): CPT

## 2022-11-09 ENCOUNTER — PATIENT MESSAGE (OUTPATIENT)
Dept: HEALTH INFORMATION MANAGEMENT | Facility: OTHER | Age: 77
End: 2022-11-09

## 2023-01-26 ENCOUNTER — APPOINTMENT (RX ONLY)
Dept: URBAN - METROPOLITAN AREA CLINIC 6 | Facility: CLINIC | Age: 78
Setting detail: DERMATOLOGY
End: 2023-01-26

## 2023-01-26 DIAGNOSIS — L82.1 OTHER SEBORRHEIC KERATOSIS: ICD-10-CM

## 2023-01-26 DIAGNOSIS — Z71.89 OTHER SPECIFIED COUNSELING: ICD-10-CM

## 2023-01-26 DIAGNOSIS — L57.0 ACTINIC KERATOSIS: ICD-10-CM

## 2023-01-26 DIAGNOSIS — L81.4 OTHER MELANIN HYPERPIGMENTATION: ICD-10-CM

## 2023-01-26 DIAGNOSIS — Z85.828 PERSONAL HISTORY OF OTHER MALIGNANT NEOPLASM OF SKIN: ICD-10-CM

## 2023-01-26 DIAGNOSIS — D18.0 HEMANGIOMA: ICD-10-CM

## 2023-01-26 DIAGNOSIS — D22 MELANOCYTIC NEVI: ICD-10-CM

## 2023-01-26 PROBLEM — D22.5 MELANOCYTIC NEVI OF TRUNK: Status: ACTIVE | Noted: 2023-01-26

## 2023-01-26 PROBLEM — D22.62 MELANOCYTIC NEVI OF LEFT UPPER LIMB, INCLUDING SHOULDER: Status: ACTIVE | Noted: 2023-01-26

## 2023-01-26 PROBLEM — D18.01 HEMANGIOMA OF SKIN AND SUBCUTANEOUS TISSUE: Status: ACTIVE | Noted: 2023-01-26

## 2023-01-26 PROCEDURE — ? SUNSCREEN TREATMENT REGIMEN

## 2023-01-26 PROCEDURE — ? LIQUID NITROGEN

## 2023-01-26 PROCEDURE — ? SUNSCREEN RECOMMENDATIONS

## 2023-01-26 PROCEDURE — ? OBSERVATION

## 2023-01-26 PROCEDURE — 17003 DESTRUCT PREMALG LES 2-14: CPT

## 2023-01-26 PROCEDURE — ? COUNSELING

## 2023-01-26 PROCEDURE — 99213 OFFICE O/P EST LOW 20 MIN: CPT | Mod: 25

## 2023-01-26 PROCEDURE — 17000 DESTRUCT PREMALG LESION: CPT

## 2023-01-26 ASSESSMENT — LOCATION DETAILED DESCRIPTION DERM
LOCATION DETAILED: LEFT RADIAL DORSAL HAND
LOCATION DETAILED: RIGHT RADIAL DORSAL HAND
LOCATION DETAILED: UPPER STERNUM
LOCATION DETAILED: MIDDLE STERNUM
LOCATION DETAILED: RIGHT VENTRAL DISTAL FOREARM
LOCATION DETAILED: EPIGASTRIC SKIN
LOCATION DETAILED: LEFT VENTRAL PROXIMAL FOREARM
LOCATION DETAILED: LEFT PROXIMAL DORSAL FOREARM
LOCATION DETAILED: RIGHT VENTRAL PROXIMAL FOREARM
LOCATION DETAILED: SUBXIPHOID
LOCATION DETAILED: LEFT MEDIAL CANTHUS
LOCATION DETAILED: LEFT ELBOW

## 2023-01-26 ASSESSMENT — LOCATION ZONE DERM
LOCATION ZONE: EYELID
LOCATION ZONE: TRUNK
LOCATION ZONE: ARM
LOCATION ZONE: HAND

## 2023-01-26 ASSESSMENT — LOCATION SIMPLE DESCRIPTION DERM
LOCATION SIMPLE: LEFT ELBOW
LOCATION SIMPLE: LEFT HAND
LOCATION SIMPLE: RIGHT HAND
LOCATION SIMPLE: RIGHT FOREARM
LOCATION SIMPLE: LEFT EYELID
LOCATION SIMPLE: CHEST
LOCATION SIMPLE: ABDOMEN
LOCATION SIMPLE: LEFT FOREARM

## 2023-01-26 ASSESSMENT — PAIN INTENSITY VAS: HOW INTENSE IS YOUR PAIN 0 BEING NO PAIN, 10 BEING THE MOST SEVERE PAIN POSSIBLE?: NO PAIN

## 2023-01-26 NOTE — PROCEDURE: LIQUID NITROGEN
Number Of Freeze-Thaw Cycles: 2 freeze-thaw cycles
Show Aperture Variable?: Yes
Duration Of Freeze Thaw-Cycle (Seconds): 10
Render Note In Bullet Format When Appropriate: No
Detail Level: Detailed
Post-Care Instructions: I reviewed with the patient in detail post-care instructions. Patient is to wear sunprotection, and avoid picking at any of the treated lesions. Pt may apply Vaseline to crusted or scabbing areas.
Consent: The patient's consent was obtained including but not limited to risks of crusting, scabbing, blistering, scarring, darker or lighter pigmentary change, recurrence, incomplete removal and infection.

## 2023-02-07 ENCOUNTER — HOSPITAL ENCOUNTER (OUTPATIENT)
Dept: RADIOLOGY | Facility: MEDICAL CENTER | Age: 78
End: 2023-02-07
Attending: FAMILY MEDICINE
Payer: MEDICARE

## 2023-02-07 DIAGNOSIS — Z12.31 VISIT FOR SCREENING MAMMOGRAM: ICD-10-CM

## 2023-02-07 PROCEDURE — 77063 BREAST TOMOSYNTHESIS BI: CPT

## 2023-02-09 ENCOUNTER — OUTPATIENT INFUSION SERVICES (OUTPATIENT)
Dept: ONCOLOGY | Facility: MEDICAL CENTER | Age: 78
End: 2023-02-09
Attending: INTERNAL MEDICINE
Payer: MEDICARE

## 2023-02-09 VITALS
SYSTOLIC BLOOD PRESSURE: 120 MMHG | OXYGEN SATURATION: 95 % | WEIGHT: 134.04 LBS | DIASTOLIC BLOOD PRESSURE: 68 MMHG | RESPIRATION RATE: 16 BRPM | HEIGHT: 63 IN | TEMPERATURE: 97.9 F | HEART RATE: 76 BPM | BODY MASS INDEX: 23.75 KG/M2

## 2023-02-09 DIAGNOSIS — M81.0 OSTEOPOROSIS, POST-MENOPAUSAL: ICD-10-CM

## 2023-02-09 LAB
CA-I BLD ISE-SCNC: 1.25 MMOL/L (ref 1.1–1.3)
CREAT BLD-MCNC: 1 MG/DL (ref 0.5–1.4)

## 2023-02-09 PROCEDURE — 36415 COLL VENOUS BLD VENIPUNCTURE: CPT

## 2023-02-09 PROCEDURE — 82330 ASSAY OF CALCIUM: CPT

## 2023-02-09 PROCEDURE — 96372 THER/PROPH/DIAG INJ SC/IM: CPT

## 2023-02-09 PROCEDURE — 82565 ASSAY OF CREATININE: CPT

## 2023-02-09 PROCEDURE — 700111 HCHG RX REV CODE 636 W/ 250 OVERRIDE (IP): Mod: JG | Performed by: INTERNAL MEDICINE

## 2023-02-09 RX ORDER — DIPHENHYDRAMINE HYDROCHLORIDE 50 MG/ML
50 INJECTION INTRAMUSCULAR; INTRAVENOUS PRN
Status: CANCELLED | OUTPATIENT
Start: 2023-07-27

## 2023-02-09 RX ORDER — MIRTAZAPINE 15 MG/1
7.5 TABLET, FILM COATED ORAL NIGHTLY PRN
COMMUNITY
Start: 2023-02-01 | End: 2023-07-07

## 2023-02-09 RX ORDER — METHYLPREDNISOLONE SODIUM SUCCINATE 125 MG/2ML
125 INJECTION, POWDER, LYOPHILIZED, FOR SOLUTION INTRAMUSCULAR; INTRAVENOUS PRN
Status: CANCELLED | OUTPATIENT
Start: 2023-07-27

## 2023-02-09 RX ORDER — PROPRANOLOL HYDROCHLORIDE 10 MG/1
10 TABLET ORAL 3 TIMES DAILY PRN
COMMUNITY
Start: 2023-02-01 | End: 2023-03-21

## 2023-02-09 RX ORDER — EPINEPHRINE 1 MG/ML(1)
0.5 AMPUL (ML) INJECTION PRN
Status: CANCELLED | OUTPATIENT
Start: 2023-07-27

## 2023-02-09 RX ADMIN — DENOSUMAB 60 MG: 60 INJECTION SUBCUTANEOUS at 15:39

## 2023-02-09 NOTE — PROGRESS NOTES
[facility]iko [facility] for a Prolia injection.  Pt denied having any new complaints, acute infections, or dental procedures in the last month or scheduled for the next month. Tamiko has had prolia injection prior and  25G butterfly needle used to draw blood from LAC, bleeding controlled with gauze and coban after. Ionized calcium/creatinine tested, WNL, pharmacist notified that Pt within parameters to treat.  Tamiko aware to continue taking vitamin D and calcium supplements. Prolia injection given in upper back of left arm SQ. Pt tolerated well, band-aid applied to injection site. Future appointments confirmed with Pt prior to leaving, Tamiko discharged home to self care.

## 2023-03-20 ENCOUNTER — HOSPITAL ENCOUNTER (OUTPATIENT)
Dept: LAB | Facility: MEDICAL CENTER | Age: 78
End: 2023-03-20
Attending: FAMILY MEDICINE
Payer: MEDICARE

## 2023-03-20 LAB
ALBUMIN SERPL BCP-MCNC: 4.2 G/DL (ref 3.2–4.9)
ALBUMIN/GLOB SERPL: 1.6 G/DL
ALP SERPL-CCNC: 43 U/L (ref 30–99)
ALT SERPL-CCNC: 14 U/L (ref 2–50)
ANION GAP SERPL CALC-SCNC: 10 MMOL/L (ref 7–16)
AST SERPL-CCNC: 16 U/L (ref 12–45)
BASOPHILS # BLD AUTO: 0.5 % (ref 0–1.8)
BASOPHILS # BLD: 0.04 K/UL (ref 0–0.12)
BILIRUB SERPL-MCNC: 0.4 MG/DL (ref 0.1–1.5)
BUN SERPL-MCNC: 17 MG/DL (ref 8–22)
CALCIUM ALBUM COR SERPL-MCNC: 8.9 MG/DL (ref 8.5–10.5)
CALCIUM SERPL-MCNC: 9.1 MG/DL (ref 8.5–10.5)
CHLORIDE SERPL-SCNC: 106 MMOL/L (ref 96–112)
CHOLEST SERPL-MCNC: 199 MG/DL (ref 100–199)
CO2 SERPL-SCNC: 24 MMOL/L (ref 20–33)
CREAT SERPL-MCNC: 1.04 MG/DL (ref 0.5–1.4)
EOSINOPHIL # BLD AUTO: 0.02 K/UL (ref 0–0.51)
EOSINOPHIL NFR BLD: 0.3 % (ref 0–6.9)
ERYTHROCYTE [DISTWIDTH] IN BLOOD BY AUTOMATED COUNT: 50.4 FL (ref 35.9–50)
EST. AVERAGE GLUCOSE BLD GHB EST-MCNC: 117 MG/DL
FASTING STATUS PATIENT QL REPORTED: NORMAL
GFR SERPLBLD CREATININE-BSD FMLA CKD-EPI: 54 ML/MIN/1.73 M 2
GLOBULIN SER CALC-MCNC: 2.6 G/DL (ref 1.9–3.5)
GLUCOSE SERPL-MCNC: 96 MG/DL (ref 65–99)
HBA1C MFR BLD: 5.7 % (ref 4–5.6)
HCT VFR BLD AUTO: 40.4 % (ref 37–47)
HCV AB SER QL: NORMAL
HDLC SERPL-MCNC: 71 MG/DL
HGB BLD-MCNC: 12.9 G/DL (ref 12–16)
IMM GRANULOCYTES # BLD AUTO: 0.02 K/UL (ref 0–0.11)
IMM GRANULOCYTES NFR BLD AUTO: 0.3 % (ref 0–0.9)
LDLC SERPL CALC-MCNC: 107 MG/DL
LYMPHOCYTES # BLD AUTO: 1.89 K/UL (ref 1–4.8)
LYMPHOCYTES NFR BLD: 25.2 % (ref 22–41)
MCH RBC QN AUTO: 31.6 PG (ref 27–33)
MCHC RBC AUTO-ENTMCNC: 31.9 G/DL (ref 33.6–35)
MCV RBC AUTO: 99 FL (ref 81.4–97.8)
MONOCYTES # BLD AUTO: 0.42 K/UL (ref 0–0.85)
MONOCYTES NFR BLD AUTO: 5.6 % (ref 0–13.4)
NEUTROPHILS # BLD AUTO: 5.11 K/UL (ref 2–7.15)
NEUTROPHILS NFR BLD: 68.1 % (ref 44–72)
NRBC # BLD AUTO: 0 K/UL
NRBC BLD-RTO: 0 /100 WBC
PLATELET # BLD AUTO: 299 K/UL (ref 164–446)
PMV BLD AUTO: 9.5 FL (ref 9–12.9)
POTASSIUM SERPL-SCNC: 4.6 MMOL/L (ref 3.6–5.5)
PROT SERPL-MCNC: 6.8 G/DL (ref 6–8.2)
RBC # BLD AUTO: 4.08 M/UL (ref 4.2–5.4)
SODIUM SERPL-SCNC: 140 MMOL/L (ref 135–145)
T3FREE SERPL-MCNC: 2.27 PG/ML (ref 2–4.4)
T4 FREE SERPL-MCNC: 1.45 NG/DL (ref 0.93–1.7)
TRIGL SERPL-MCNC: 106 MG/DL (ref 0–149)
TSH SERPL DL<=0.005 MIU/L-ACNC: 1.58 UIU/ML (ref 0.38–5.33)
WBC # BLD AUTO: 7.5 K/UL (ref 4.8–10.8)

## 2023-03-20 PROCEDURE — 85025 COMPLETE CBC W/AUTO DIFF WBC: CPT

## 2023-03-20 PROCEDURE — 80061 LIPID PANEL: CPT

## 2023-03-20 PROCEDURE — 86803 HEPATITIS C AB TEST: CPT

## 2023-03-20 PROCEDURE — 83036 HEMOGLOBIN GLYCOSYLATED A1C: CPT | Mod: GA

## 2023-03-20 PROCEDURE — 80053 COMPREHEN METABOLIC PANEL: CPT

## 2023-03-20 PROCEDURE — 84439 ASSAY OF FREE THYROXINE: CPT

## 2023-03-20 PROCEDURE — 84481 FREE ASSAY (FT-3): CPT

## 2023-03-20 PROCEDURE — 84443 ASSAY THYROID STIM HORMONE: CPT

## 2023-03-20 PROCEDURE — 36415 COLL VENOUS BLD VENIPUNCTURE: CPT

## 2023-03-21 ENCOUNTER — APPOINTMENT (OUTPATIENT)
Dept: RADIOLOGY | Facility: MEDICAL CENTER | Age: 78
End: 2023-03-21
Attending: EMERGENCY MEDICINE
Payer: MEDICARE

## 2023-03-21 ENCOUNTER — HOSPITAL ENCOUNTER (EMERGENCY)
Facility: MEDICAL CENTER | Age: 78
End: 2023-03-21
Attending: EMERGENCY MEDICINE
Payer: MEDICARE

## 2023-03-21 VITALS
HEIGHT: 65 IN | BODY MASS INDEX: 22.55 KG/M2 | TEMPERATURE: 97.8 F | HEART RATE: 74 BPM | OXYGEN SATURATION: 96 % | SYSTOLIC BLOOD PRESSURE: 148 MMHG | DIASTOLIC BLOOD PRESSURE: 73 MMHG | WEIGHT: 135.36 LBS | RESPIRATION RATE: 12 BRPM

## 2023-03-21 DIAGNOSIS — R03.0 ELEVATED BLOOD PRESSURE READING: ICD-10-CM

## 2023-03-21 DIAGNOSIS — R10.9 ACUTE ABDOMINAL PAIN: ICD-10-CM

## 2023-03-21 LAB
ALBUMIN SERPL BCP-MCNC: 4.3 G/DL (ref 3.2–4.9)
ALBUMIN/GLOB SERPL: 1.4 G/DL
ALP SERPL-CCNC: 44 U/L (ref 30–99)
ALT SERPL-CCNC: 15 U/L (ref 2–50)
ANION GAP SERPL CALC-SCNC: 10 MMOL/L (ref 7–16)
AST SERPL-CCNC: 25 U/L (ref 12–45)
BASOPHILS # BLD AUTO: 0.5 % (ref 0–1.8)
BASOPHILS # BLD: 0.04 K/UL (ref 0–0.12)
BILIRUB SERPL-MCNC: 0.4 MG/DL (ref 0.1–1.5)
BUN SERPL-MCNC: 21 MG/DL (ref 8–22)
CALCIUM ALBUM COR SERPL-MCNC: 9.3 MG/DL (ref 8.5–10.5)
CALCIUM SERPL-MCNC: 9.5 MG/DL (ref 8.4–10.2)
CHLORIDE SERPL-SCNC: 103 MMOL/L (ref 96–112)
CO2 SERPL-SCNC: 21 MMOL/L (ref 20–33)
CREAT SERPL-MCNC: 0.84 MG/DL (ref 0.5–1.4)
EKG IMPRESSION: NORMAL
EOSINOPHIL # BLD AUTO: 0.02 K/UL (ref 0–0.51)
EOSINOPHIL NFR BLD: 0.2 % (ref 0–6.9)
ERYTHROCYTE [DISTWIDTH] IN BLOOD BY AUTOMATED COUNT: 47.5 FL (ref 35.9–50)
GFR SERPLBLD CREATININE-BSD FMLA CKD-EPI: 70 ML/MIN/1.73 M 2
GLOBULIN SER CALC-MCNC: 3 G/DL (ref 1.9–3.5)
GLUCOSE SERPL-MCNC: 97 MG/DL (ref 65–99)
HCT VFR BLD AUTO: 38.6 % (ref 37–47)
HGB BLD-MCNC: 12.9 G/DL (ref 12–16)
IMM GRANULOCYTES # BLD AUTO: 0.03 K/UL (ref 0–0.11)
IMM GRANULOCYTES NFR BLD AUTO: 0.3 % (ref 0–0.9)
LYMPHOCYTES # BLD AUTO: 1.81 K/UL (ref 1–4.8)
LYMPHOCYTES NFR BLD: 20.7 % (ref 22–41)
MCH RBC QN AUTO: 32.2 PG (ref 27–33)
MCHC RBC AUTO-ENTMCNC: 33.4 G/DL (ref 33.6–35)
MCV RBC AUTO: 96.3 FL (ref 81.4–97.8)
MONOCYTES # BLD AUTO: 0.63 K/UL (ref 0–0.85)
MONOCYTES NFR BLD AUTO: 7.2 % (ref 0–13.4)
NEUTROPHILS # BLD AUTO: 6.2 K/UL (ref 2–7.15)
NEUTROPHILS NFR BLD: 71.1 % (ref 44–72)
NRBC # BLD AUTO: 0 K/UL
NRBC BLD-RTO: 0 /100 WBC
PLATELET # BLD AUTO: 294 K/UL (ref 164–446)
PMV BLD AUTO: 9.2 FL (ref 9–12.9)
POTASSIUM SERPL-SCNC: 4.4 MMOL/L (ref 3.6–5.5)
PROT SERPL-MCNC: 7.3 G/DL (ref 6–8.2)
RBC # BLD AUTO: 4.01 M/UL (ref 4.2–5.4)
SODIUM SERPL-SCNC: 134 MMOL/L (ref 135–145)
TROPONIN T SERPL-MCNC: 11 NG/L (ref 6–19)
TROPONIN T SERPL-MCNC: 12 NG/L (ref 6–19)
WBC # BLD AUTO: 8.7 K/UL (ref 4.8–10.8)

## 2023-03-21 PROCEDURE — 84484 ASSAY OF TROPONIN QUANT: CPT

## 2023-03-21 PROCEDURE — 36415 COLL VENOUS BLD VENIPUNCTURE: CPT

## 2023-03-21 PROCEDURE — 80053 COMPREHEN METABOLIC PANEL: CPT

## 2023-03-21 PROCEDURE — 74175 CTA ABDOMEN W/CONTRAST: CPT

## 2023-03-21 PROCEDURE — 85025 COMPLETE CBC W/AUTO DIFF WBC: CPT

## 2023-03-21 PROCEDURE — 99284 EMERGENCY DEPT VISIT MOD MDM: CPT

## 2023-03-21 PROCEDURE — 700117 HCHG RX CONTRAST REV CODE 255: Performed by: EMERGENCY MEDICINE

## 2023-03-21 PROCEDURE — 93005 ELECTROCARDIOGRAM TRACING: CPT | Performed by: EMERGENCY MEDICINE

## 2023-03-21 PROCEDURE — 71045 X-RAY EXAM CHEST 1 VIEW: CPT

## 2023-03-21 PROCEDURE — 93005 ELECTROCARDIOGRAM TRACING: CPT

## 2023-03-21 RX ORDER — GLUCOSAMINE/CHONDR SU A SOD 750-600 MG
1 TABLET ORAL DAILY
COMMUNITY

## 2023-03-21 RX ORDER — DENOSUMAB 60 MG/ML
60 INJECTION SUBCUTANEOUS
COMMUNITY

## 2023-03-21 RX ORDER — HYDROXYZINE HYDROCHLORIDE 10 MG/1
10 TABLET, FILM COATED ORAL 3 TIMES DAILY PRN
Status: SHIPPED | COMMUNITY
End: 2023-04-06

## 2023-03-21 RX ORDER — BUTYROSPERMUM PARKII(SHEA BUTTER), SIMMONDSIA CHINENSIS (JOJOBA) SEED OIL, ALOE BARBADENSIS LEAF EXTRACT .01; 1; 3.5 G/100G; G/100G; G/100G
2000 LIQUID TOPICAL DAILY
COMMUNITY

## 2023-03-21 RX ADMIN — IOHEXOL 80 ML: 350 INJECTION, SOLUTION INTRAVENOUS at 16:55

## 2023-03-21 ASSESSMENT — FIBROSIS 4 INDEX: FIB4 SCORE: 1.13

## 2023-03-21 NOTE — ED TRIAGE NOTES
Chief Complaint   Patient presents with    Hypertension     Ran out of her BP meds for 3 days ago, PCP restarted her this am, she comes to ED because her BP remains elevated.    She describes resolved chest pressure, she also said she had an EKG at her PCP that ws totally normal.

## 2023-03-21 NOTE — ED PROVIDER NOTES
"  ER Provider Note    Scribed for Jake Zuniga M.d. by Emmanuel Brandon. 3/21/2023  3:01 PM    Primary Care Provider: Rebecca Sifuentes M.D.    CHIEF COMPLAINT  Chief Complaint   Patient presents with    Hypertension     Ran out of her BP meds for 3 days ago, PCP restarted her this am, she comes to ED because her BP remains elevated.         HPI/ROS  LIMITATION TO HISTORY   Select: : None    OUTSIDE HISTORIAN(S):  None     Sara Sandoval is a 77 y.o. female who presents to the ED for evaluation of persistent elevated blood pressure, onset today. She states that she has a history of hypertension and ran out of her blood pressure medication three days ago. She is taking Lisinopril. Then yesterday evening she began felling a pressure and \"pulsating/throbbing\" sensation in her epigastric area and extremities that she believed was due to her elevated blood pressure. This morning she presented to her primary care provider who restarted her on her blood pressure medication. However, even after restarting her medication her systolic blood pressure remained over 150 so she was instructed by her primary care provider to present to the ED for further evaluation. While in the ED today she notes that the throbbing and pulsating sensation has subsided since resuming her blood pressure medication. She also reports associated symptoms of mild abdominal bloating. She denies any fever, headache, nausea, vomiting, diarrhea, abdominal pain, dysuria, or lower extremity swelling. She has not history of stroke or heat attack. The patient also notes that she has been experiencing increased anxiety and stress due to her significant other passing away and issues with her house. She is currently taking hydroxyzine and thyroid medication as well. She drinks alcohol very infrequently and does not use any recreational drugs.     PAST MEDICAL HISTORY  Past Medical History:   Diagnosis Date    Arthritis     Pain 6/11/12    left hip    " Shingles 2010    Unspecified disorder of thyroid     hypothyroid       SURGICAL HISTORY  Past Surgical History:   Procedure Laterality Date    HIP ARTHROPLASTY TOTAL  12/10/2012    Performed by Gio Lyons M.D. at SURGERY [facility]    HIP ARTHROPLASTY TOTAL  6/25/2012    right; Performed by GIO LYONS at SURGERY [facility]    COLONOSCOPY  2004    HYSTERECTOMY, TOTAL ABDOMINAL  1977    OTHER ORTHOPEDIC SURGERY         FAMILY HISTORY  Family History   Problem Relation Age of Onset    Heart Disease Paternal Aunt     Cancer Paternal Aunt     Hypertension Unknown     Stroke Unknown        SOCIAL HISTORY   reports that she has never smoked. She has never used smokeless tobacco. She reports that she does not drink alcohol and does not use drugs.    CURRENT MEDICATIONS  Previous Medications    ASPIRIN (ASA) 81 MG CHEW    Take 81 mg by mouth every day.      CALCIUM CITRATE (CALCITRATE) 950 (200 CA) MG TAB    Take 1,000 mg by mouth every day.    CALCIUM-MAGNESIUM-VITAMIN D (CITRACAL CALCIUM+D PO)    Take 1,000 mg by mouth every day.    CHOLECALCIFEROL (VITAMIN D3) 2000 UNIT TABS    Take  by mouth every day.    COENZYME Q-10 30 MG CAPSULE    Take 200 mg by mouth every day.    IBUPROFEN (MOTRIN) 800 MG TABS    Take 800 mg by mouth as needed.    LEVOTHYROXINE (SYNTHROID) 50 MCG TABS    Take 75 mcg by mouth every day.    LISINOPRIL (PRINIVIL) 20 MG TAB    Take 20 mg by mouth every day.    LUTEIN 20 MG TABS    Take  by mouth every day.    MAGNESIUM OXIDE (MAG-OX) 400 MG TAB TABLET    Take 400 mg by mouth every day.    MIRTAZAPINE (REMERON) 15 MG TAB    TAKE 1-2 TABLETS BY MOUTH AT BEDTIME 1 HOUR PRIOR TO SLEEP    MULTIPLE VITAMIN (MULTIVITAMIN PO)    Take  by mouth every day.    OMEGA-3 FATTY ACIDS (FISH OIL) 1000 MG CAP CAPSULE    Take 1,000 mg by mouth 3 times a day, with meals.    PROPRANOLOL (INDERAL) 10 MG TAB    Take 10 mg by mouth 3 times a day as needed.    PSYLLIUM (METAMUCIL) 58.12 % PACK     "Take 1 Packet by mouth every day.    TURMERIC 500 MG CAP    Take 1,200 mg by mouth.       ALLERGIES  Patient has no known allergies.    PHYSICAL EXAM  BP (!) 178/97   Pulse 80   Temp 36.8 °C (98.2 °F) (Temporal)   Resp 16   Ht 1.638 m (5' 4.5\")   Wt 61.4 kg (135 lb 5.8 oz)   SpO2 97%   BMI 22.88 kg/m²   Constitutional: Alert in no apparent distress.  HENT: No signs of trauma, Bilateral external ears normal, Nose normal. Uvula midline.   Eyes: Pupils are equal and reactive, Conjunctiva normal, Non-icteric.   Neck: Normal range of motion, No tenderness, Supple, No stridor.   Lymphatic: No lymphadenopathy noted.   Cardiovascular: Regular rate and rhythm, no murmurs.   Thorax & Lungs: Normal breath sounds, No respiratory distress, No wheezing, No chest tenderness.   Abdomen: Bowel sounds normal, Soft, No tenderness, No peritoneal signs, No masses, No pulsatile masses.   Skin: Warm, Dry, No erythema, No rash.   Back: No bony tenderness, No CVA tenderness.   Extremities: Intact distal pulses, 2+ peripheral pulses, No lower extremity edema, No tenderness, No cyanosis.  Musculoskeletal: Good range of motion in all major joints. No tenderness to palpation or major deformities noted.   Neurologic: Alert , Normal motor function, Normal sensory function, No focal deficits noted.   Psychiatric: Affect normal, Judgment normal, Mood normal.        DIAGNOSTIC STUDIES    Labs:   Labs Reviewed   CBC WITH DIFFERENTIAL - Abnormal; Notable for the following components:       Result Value    RBC 4.01 (*)     MCHC 33.4 (*)     Lymphocytes 20.70 (*)     All other components within normal limits    Narrative:     Biotin intake of greater than 5 mg per day may interfere with  troponin levels, causing false low values.   COMP METABOLIC PANEL - Abnormal; Notable for the following components:    Sodium 134 (*)     All other components within normal limits    Narrative:     Biotin intake of greater than 5 mg per day may interfere " with  troponin levels, causing false low values.   TROPONIN    Narrative:     Biotin intake of greater than 5 mg per day may interfere with  troponin levels, causing false low values.   TROPONIN    Narrative:     Biotin intake of greater than 5 mg per day may interfere with  troponin levels, causing false low values.   CORRECTED CALCIUM    Narrative:     Biotin intake of greater than 5 mg per day may interfere with  troponin levels, causing false low values.   ESTIMATED GFR    Narrative:     Biotin intake of greater than 5 mg per day may interfere with  troponin levels, causing false low values.        EKG:   I have independently interpreted this EKG   Report   Date Value Ref Range Status   2023       [facility] Emergency Dept.    Test Date:  2023  Pt Name:    JOSETTE MORALES                 Department: [facility]  MRN:        [id number]                      Room:  Gender:     Female                       Technician: [id number]  :        [id number]                   Requested By:ER TRIAGE PROTOCOL  Order #:    [id number]                    Reading MD:    Measurements  Intervals                                Axis  Rate:       74                           P:          -13  HI:         155                          QRS:        80  QRSD:       100                          T:          53  QT:         389  QTc:        432    Interpretive Statements  Sinus rhythm  Baseline wander in lead(s) I,aVL  Compared to ECG 2012 11:57:19  No significant changes                  Radiology:   The attending emergency physician has independently interpreted the diagnostic imaging associated with this visit and am waiting the final reading from the radiologist.   Preliminary interpretation is a follows: no dissection  Radiologist interpretation:     CT-CTA COMPLETE THORACOABDOMINAL AORTA   Final Result      1.  No thoracic or abdominal aortic aneurysm or dissection.   2.  No acute cardiopulmonary disease.   3.   No focal mesenteric inflammatory process.                           DX-CHEST-PORTABLE (1 VIEW)   Final Result      Mild cardiomegaly.           COURSE & MEDICAL DECISION MAKING     ED Observation Status? No; Patient does not meet criteria for ED Observation.     INITIAL ASSESSMENT, COURSE AND PLAN  Care Narrative:     I verified that the patient was wearing a mask and I was wearing appropriate PPE every time I entered the room. The patient's mask was on the patient at all times during my encounter except for a brief view of the oropharynx.     3:15 PM- Patient seen and examined at bedside. Discussed plan of care, including lab work and imaging. Patient agrees to the plan of care. Ordered for DX-Chest, CT-CTA complete thoracoabdominal aorta, Troponin now and every two hours, CBC with differential, CMP, and an EKG to evaluate her symptoms.      #abdominal pain    CT scan of thoracoabdominal ordered in order to rule out aortic dissection given patient's presenting profound hypertension along with patient's description of pulsatile abdominal pain  No RLQ pain, TTP or fever to suggest appendicitis  No LLQ pain or TTP or fever to suggest diverticulitis  ECG unremarkable and no chest pain to suggest intrathoracic etiology of abd pain   Troponin negative  Heart score 3      #Hypertension  Blood work and imaging was reassuring  Patient with significant improvement in hypertension with reassurance in the emergency department from 180-150 prior to discharge.      5:42 PM - I reevaluated the patient at bedside. I discussed the patient's diagnostic study results. I discussed plan for discharge and follow up as outlined below. The patient is stable for discharge at this time and will return for any new or worsening symptoms. Patient verbalizes understanding and support with my plan for discharge.        DISPOSITION AND DISCUSSIONS  I have discussed management of the patient with the following physicians and SREE's:   None    Discussion of management with other \[facility]\"" or appropriate source(s): None     Escalation of care considered, and ultimately not performed: acute inpatient care management, however at this time, the patient is most appropriate for outpatient management.      The patient will return for new or worsening symptoms and is stable at the time of discharge.      DISPOSITION:  Patient will be discharged home in stable condition.    FOLLOW UP:  Rebecca Sifuentes M.D.  [address]  [address]  [place] [address]  [phone]    In 3 days      [facility], Emergency Dept  [address]  [facility] [address] [phone]    If symptoms worsen      FINAL DIANGOSIS  1. Acute abdominal pain    2. Elevated blood pressure reading         Emmanuel BONILLA (Scribe), am scribing for, and in the presence of, Jake Zuniga M.D..    Electronically signed by: Emmanuel Brandon (Scribe), 3/21/2023    Jake BONILLA M.D. personally performed the services described in this documentation, as scribed by Emmanuel Brandon in my presence, and it is both accurate and complete.      The note accurately reflects work and decisions made by me.  Jake Zuniga M.D.  3/21/2023  10:44 PM

## 2023-03-22 NOTE — DISCHARGE INSTRUCTIONS
Please discuss the following findings with your regular doctor:  CT-CTA COMPLETE THORACOABDOMINAL AORTA   Final Result      1.  No thoracic or abdominal aortic aneurysm or dissection.   2.  No acute cardiopulmonary disease.   3.  No focal mesenteric inflammatory process.                           DX-CHEST-PORTABLE (1 VIEW)   Final Result      Mild cardiomegaly.        Labs Reviewed   CBC WITH DIFFERENTIAL - Abnormal; Notable for the following components:       Result Value    RBC 4.01 (*)     MCHC 33.4 (*)     Lymphocytes 20.70 (*)     All other components within normal limits    Narrative:     Biotin intake of greater than 5 mg per day may interfere with  troponin levels, causing false low values.   COMP METABOLIC PANEL - Abnormal; Notable for the following components:    Sodium 134 (*)     All other components within normal limits    Narrative:     Biotin intake of greater than 5 mg per day may interfere with  troponin levels, causing false low values.   TROPONIN    Narrative:     Biotin intake of greater than 5 mg per day may interfere with  troponin levels, causing false low values.   TROPONIN    Narrative:     Biotin intake of greater than 5 mg per day may interfere with  troponin levels, causing false low values.   CORRECTED CALCIUM    Narrative:     Biotin intake of greater than 5 mg per day may interfere with  troponin levels, causing false low values.   ESTIMATED GFR    Narrative:     Biotin intake of greater than 5 mg per day may interfere with  troponin levels, causing false low values.

## 2023-03-22 NOTE — ED NOTES
Med rec updated and complete, per pt   Allergies reviewed, per pt  Interviewed pt with family at bedside with permission from pt.  Pt had a list of medications at bedside, went over list of medications and returned list back to pts family. Pt reports that she ran out of her LISINOPRIL 20MG on 3/18/2023 for 3 days, but then took one today @ 1200

## 2023-04-04 ENCOUNTER — TELEPHONE (OUTPATIENT)
Dept: CARDIOLOGY | Facility: MEDICAL CENTER | Age: 78
End: 2023-04-04
Payer: MEDICARE

## 2023-04-04 NOTE — TELEPHONE ENCOUNTER
Called patient to request records for NP appointment with Giuliana Fernandez. Called to confirm if this will be first time patient is seeing a cardiologist as well as verify all records including labs, imaging and any other cardiac testing are in epic. No answer, left voicemail to call back.

## 2023-04-05 NOTE — PROGRESS NOTES
Cardiology Consultation Note    Date of note:    4/5/2023    Primary Care Provider: Rebecca Sifuentes M.D.  Referring Provider: Jake Zuniga M.D. Emergency department physician    Patient Name: Tamiko Sandoval   YOB: 1945  MRN:              [id number]      Chief Complaint   Patient presents with    HTN (Uncontrolled)     New hospital follow up           Subjective:   Tamiko Sandoval is a very pleasant 77 y.o. female who presents today for initial consultation.    Tamiko presented to the emergency department 3/21/2023 for elevated blood pressure.  She had run out of her blood pressure 3 days prior and the day of her ER visit had just restarted the lisinopril however her blood pressure remained over 150 systolic therefore her primary care provider recommended she seek emergent care.  At the emergency department her blood pressure was 178/97.  She described throbbing and pulsating sensation that subsided since resuming her blood pressure medication.  She had a CT scan of her thoracoabdominal aorta which showed no sign of aortic dissection or aneurysm.  Her high-sensitivity troponin was normal.  Her EKG was unremarkable.  At the time of her discharge her systolic blood pressure was down to 150.    Tamiko describes a significant amount of anxiety in the last year.  Her  committed suicide 1 year ago.  She now is selling her 4 bedroom house and downsizing which has been a lot of work for her.  Fortunately she does have support from her son and she is also seeing a therapist.  She describes feeling a heart pounding that she has never felt before.  She does not have a fast heart rate but just feels her heart is beating strongly.  Around the time this started she started checking her blood pressures and noticed they were elevated.  In the past she has been on lisinopril 20 mg.  She saw her primary care provider in February who gave her antianxiolytics as well as propanolol to use as  needed.  Stephania has been using the propanolol 1-2 times per day with good relief of her palpitations.  She continues to take 20 mg of lisinopril.  She brings her blood pressure readings in today which show elevated blood pressures at home, generally readings between 140-160 systolic.    Other medical problems include hypothyroidism, osteoporosis.    Father  at 84 yo of heart attack. Siblings with no heart disease.  She is a retired teacher.    Past Medical History:   Diagnosis Date    Arthritis     Pain 12    left hip    Shingles     Unspecified disorder of thyroid     hypothyroid       Social History     Tobacco Use   Smoking Status Never   Smokeless Tobacco Never          Social History     Substance and Sexual Activity   Alcohol Use No    Alcohol/week: 1.5 oz    Types: 3 Standard drinks or equivalent per week         Family History   Problem Relation Age of Onset    Heart Disease Paternal Aunt     Cancer Paternal Aunt     Hypertension Unknown     Stroke Unknown          No Known Allergies      Current Outpatient Medications   Medication Sig Dispense Refill    propranolol (INDERAL) 10 MG Tab Take 10 mg by mouth 3 times a day as needed. Palpitations, anxiety      lisinopril (PRINIVIL) 20 MG Tab Take 1 Tablet by mouth 2 times a day. 180 Tablet 3    Cholecalciferol (D3 2000) 2000 UNIT Cap Take 2,000 Units by mouth every day.      Non Formulary Request Take 1 Tablet by mouth every day. Calcium Citrate, Magnesium, and Zinc      Multiple Vitamins-Minerals (MULTI FOR HER 50+ PO) Take 1 Tablet by mouth every day.      Lutein-Zeaxanthin 25-5 MG Cap Take 1 Tablet by mouth every day.      PSYLLIUM PO Take 1 Packet by mouth every day.      mirtazapine (REMERON) 15 MG Tab Take 7.5 mg by mouth at bedtime as needed (For sleep).      TURMERIC PO Take 1,200 mg by mouth.      Coenzyme Q10 200 MG Cap Take 200 mg by mouth every day.      Omega-3 Fatty Acids (FISH OIL) 1000 MG Cap capsule Take 4,000 mg by mouth every day.  "     levothyroxine (SYNTHROID) 75 MCG Tab Take 75 mcg by mouth every day.      denosumab (PROLIA) 60 MG/ML Solution Prefilled Syringe injection Inject 60 mg under the skin every 6 months. Last dose was 2/9/2023       No current facility-administered medications for this visit.         Review of Systems   Constitutional:  Negative for weight loss.   Respiratory:  Negative for cough and shortness of breath.    Cardiovascular:  Positive for chest pain and palpitations. Negative for orthopnea, leg swelling and PND.   Gastrointestinal:  Negative for blood in stool and melena.   Neurological:  Negative for dizziness.   Psychiatric/Behavioral:  Negative for substance abuse and suicidal ideas. The patient is nervous/anxious and has insomnia.         Objective:   BP (!) 140/64 (BP Location: Left arm, Patient Position: Sitting)   Pulse 86   Resp 16   Ht 1.626 m (5' 4\")   Wt 59.4 kg (131 lb)   SpO2 100%   BMI 22.49 kg/m²        Physical Exam  Vitals reviewed.   HENT:      Head: Normocephalic and atraumatic.   Cardiovascular:      Rate and Rhythm: Normal rate and regular rhythm.      Heart sounds: No murmur heard.  Pulmonary:      Effort: Pulmonary effort is normal.      Breath sounds: No wheezing or rales.   Musculoskeletal:      Right lower leg: No edema.      Left lower leg: No edema.   Skin:     General: Skin is warm and dry.   Neurological:      Mental Status: She is alert.   Psychiatric:         Judgment: Judgment normal.        Cardiac Imaging and Procedures Review:    EKG 3/21/2023: Normal sinus rhythm     CTA Aorta 3/21/23  IMPRESSION:     1.  No thoracic or abdominal aortic aneurysm or dissection.  2.  No acute cardiopulmonary disease.  3.  No focal mesenteric inflammatory process.  Assessment:     1. Essential hypertension, benign  Basic Metabolic Panel      2. Palpitations             Medical Decision Making:  Today's Assessment / Plan:   Hypertension  Palpitations  - suspect a combination of primary " hypertension due to atherosclerosis as well as secondary due to anxiety.   - She had symptomatic palpitations while in the office and on ausculation she was in normal rhythm without ectopy noted.     - increase lisinopril to 20mg bid, check BMP in 2-3 weeks  - may use propranolol 10mg TID PRN  - if palpitations persist or seem long lasting we'll check a event monitor to rule out arrhythmias  - return in 3mo to reassess symptoms.     Thank you for this consultation.     Giuliana Fernandez PA-C  [facility]    I personally spent a total of 45 minutes which includes face-to-face time and non-face-to-face time spent on preparing to see the patient, reviewing hospital notes and tests, obtaining history from the patient, performing a medically appropriate exam, counseling and educating the patient, ordering medications/tests/procedures/referrals as clinically indicated, and documenting information in the electronic medical record.

## 2023-04-06 ENCOUNTER — OFFICE VISIT (OUTPATIENT)
Dept: CARDIOLOGY | Facility: MEDICAL CENTER | Age: 78
End: 2023-04-06
Attending: EMERGENCY MEDICINE
Payer: MEDICARE

## 2023-04-06 VITALS
HEIGHT: 64 IN | OXYGEN SATURATION: 100 % | RESPIRATION RATE: 16 BRPM | BODY MASS INDEX: 22.36 KG/M2 | HEART RATE: 86 BPM | DIASTOLIC BLOOD PRESSURE: 64 MMHG | SYSTOLIC BLOOD PRESSURE: 140 MMHG | WEIGHT: 131 LBS

## 2023-04-06 DIAGNOSIS — I10 ESSENTIAL HYPERTENSION, BENIGN: ICD-10-CM

## 2023-04-06 DIAGNOSIS — R00.2 PALPITATIONS: ICD-10-CM

## 2023-04-06 PROCEDURE — 99204 OFFICE O/P NEW MOD 45 MIN: CPT | Performed by: PHYSICIAN ASSISTANT

## 2023-04-06 RX ORDER — LISINOPRIL 20 MG/1
20 TABLET ORAL 2 TIMES DAILY
Qty: 180 TABLET | Refills: 3 | Status: SHIPPED | OUTPATIENT
Start: 2023-04-06 | End: 2023-07-07 | Stop reason: SDUPTHER

## 2023-04-06 RX ORDER — PROPRANOLOL HYDROCHLORIDE 10 MG/1
10 TABLET ORAL 3 TIMES DAILY PRN
COMMUNITY
End: 2024-02-13

## 2023-04-06 RX ORDER — ALPRAZOLAM 0.25 MG/1
TABLET ORAL
COMMUNITY
Start: 2023-04-04 | End: 2023-04-06

## 2023-04-06 RX ORDER — LISINOPRIL 20 MG/1
20 TABLET ORAL 2 TIMES DAILY
Qty: 60 TABLET | Refills: 11 | Status: SHIPPED | OUTPATIENT
Start: 2023-04-06 | End: 2023-04-06

## 2023-04-06 ASSESSMENT — ENCOUNTER SYMPTOMS
SHORTNESS OF BREATH: 0
COUGH: 0
INSOMNIA: 1
NERVOUS/ANXIOUS: 1
DIZZINESS: 0
PND: 0
PALPITATIONS: 1
ORTHOPNEA: 0
WEIGHT LOSS: 0
BLOOD IN STOOL: 0

## 2023-04-06 ASSESSMENT — FIBROSIS 4 INDEX: FIB4 SCORE: 1.73

## 2023-04-06 ASSESSMENT — LIFESTYLE VARIABLES: SUBSTANCE_ABUSE: 0

## 2023-04-06 NOTE — PATIENT INSTRUCTIONS
Take lisinopril 20mg twice a day (morning and night). You can all the [facility] pharmacy to confirm they will update your RX    Goal blood pressure is 140/80 or less.     You can use Propranolol 10mg up to three times a day for heart pounding, anxiety. If you are using it consistently three times a day then I recommend decreasing slowly to twice a day then once a day once you are no longer needing it. Have your blood drawn in about 2-3 weeks.

## 2023-04-28 ENCOUNTER — HOSPITAL ENCOUNTER (OUTPATIENT)
Dept: LAB | Facility: MEDICAL CENTER | Age: 78
End: 2023-04-28
Attending: PHYSICIAN ASSISTANT
Payer: MEDICARE

## 2023-04-28 DIAGNOSIS — I10 ESSENTIAL HYPERTENSION, BENIGN: ICD-10-CM

## 2023-04-28 LAB
ANION GAP SERPL CALC-SCNC: 9 MMOL/L (ref 7–16)
BUN SERPL-MCNC: 23 MG/DL (ref 8–22)
CALCIUM SERPL-MCNC: 8.9 MG/DL (ref 8.5–10.5)
CHLORIDE SERPL-SCNC: 99 MMOL/L (ref 96–112)
CO2 SERPL-SCNC: 23 MMOL/L (ref 20–33)
CREAT SERPL-MCNC: 1.01 MG/DL (ref 0.5–1.4)
GFR SERPLBLD CREATININE-BSD FMLA CKD-EPI: 56 ML/MIN/1.73 M 2
GLUCOSE SERPL-MCNC: 68 MG/DL (ref 65–99)
POTASSIUM SERPL-SCNC: 5.5 MMOL/L (ref 3.6–5.5)
SODIUM SERPL-SCNC: 131 MMOL/L (ref 135–145)

## 2023-04-28 PROCEDURE — 36415 COLL VENOUS BLD VENIPUNCTURE: CPT

## 2023-04-28 PROCEDURE — 80048 BASIC METABOLIC PNL TOTAL CA: CPT

## 2023-04-28 NOTE — RESULT ENCOUNTER NOTE
Tamiko,   Your labs are stable. Your potassium is on the high side of normal which is good, but please avoid high potassium foods such as citrus, bananas, potatoes, avocado, etc.     Giuliana

## 2023-05-08 SDOH — ECONOMIC STABILITY: FOOD INSECURITY: WITHIN THE PAST 12 MONTHS, YOU WORRIED THAT YOUR FOOD WOULD RUN OUT BEFORE YOU GOT MONEY TO BUY MORE.: NEVER TRUE

## 2023-05-08 SDOH — ECONOMIC STABILITY: HOUSING INSECURITY
IN THE LAST 12 MONTHS, WAS THERE A TIME WHEN YOU DID NOT HAVE A STEADY PLACE TO SLEEP OR SLEPT IN A SHELTER (INCLUDING NOW)?: NO

## 2023-05-08 SDOH — ECONOMIC STABILITY: INCOME INSECURITY: HOW HARD IS IT FOR YOU TO PAY FOR THE VERY BASICS LIKE FOOD, HOUSING, MEDICAL CARE, AND HEATING?: NOT HARD AT ALL

## 2023-05-08 SDOH — HEALTH STABILITY: PHYSICAL HEALTH: ON AVERAGE, HOW MANY MINUTES DO YOU ENGAGE IN EXERCISE AT THIS LEVEL?: 150+ MIN

## 2023-05-08 SDOH — ECONOMIC STABILITY: TRANSPORTATION INSECURITY
IN THE PAST 12 MONTHS, HAS THE LACK OF TRANSPORTATION KEPT YOU FROM MEDICAL APPOINTMENTS OR FROM GETTING MEDICATIONS?: NO

## 2023-05-08 SDOH — ECONOMIC STABILITY: INCOME INSECURITY: IN THE LAST 12 MONTHS, WAS THERE A TIME WHEN YOU WERE NOT ABLE TO PAY THE MORTGAGE OR RENT ON TIME?: NO

## 2023-05-08 SDOH — ECONOMIC STABILITY: TRANSPORTATION INSECURITY
IN THE PAST 12 MONTHS, HAS LACK OF TRANSPORTATION KEPT YOU FROM MEETINGS, WORK, OR FROM GETTING THINGS NEEDED FOR DAILY LIVING?: NO

## 2023-05-08 SDOH — ECONOMIC STABILITY: FOOD INSECURITY: WITHIN THE PAST 12 MONTHS, THE FOOD YOU BOUGHT JUST DIDN'T LAST AND YOU DIDN'T HAVE MONEY TO GET MORE.: NEVER TRUE

## 2023-05-08 SDOH — ECONOMIC STABILITY: HOUSING INSECURITY: IN THE LAST 12 MONTHS, HOW MANY PLACES HAVE YOU LIVED?: 1

## 2023-05-08 SDOH — HEALTH STABILITY: PHYSICAL HEALTH: ON AVERAGE, HOW MANY DAYS PER WEEK DO YOU ENGAGE IN MODERATE TO STRENUOUS EXERCISE (LIKE A BRISK WALK)?: 5 DAYS

## 2023-05-08 SDOH — HEALTH STABILITY: MENTAL HEALTH
STRESS IS WHEN SOMEONE FEELS TENSE, NERVOUS, ANXIOUS, OR CAN'T SLEEP AT NIGHT BECAUSE THEIR MIND IS TROUBLED. HOW STRESSED ARE YOU?: ONLY A LITTLE

## 2023-05-08 SDOH — ECONOMIC STABILITY: TRANSPORTATION INSECURITY
IN THE PAST 12 MONTHS, HAS LACK OF RELIABLE TRANSPORTATION KEPT YOU FROM MEDICAL APPOINTMENTS, MEETINGS, WORK OR FROM GETTING THINGS NEEDED FOR DAILY LIVING?: NO

## 2023-05-08 ASSESSMENT — SOCIAL DETERMINANTS OF HEALTH (SDOH)
IN A TYPICAL WEEK, HOW MANY TIMES DO YOU TALK ON THE PHONE WITH FAMILY, FRIENDS, OR NEIGHBORS?: MORE THAN THREE TIMES A WEEK
DO YOU BELONG TO ANY CLUBS OR ORGANIZATIONS SUCH AS CHURCH GROUPS UNIONS, FRATERNAL OR ATHLETIC GROUPS, OR SCHOOL GROUPS?: YES
HOW MANY DRINKS CONTAINING ALCOHOL DO YOU HAVE ON A TYPICAL DAY WHEN YOU ARE DRINKING: 1 OR 2
HOW OFTEN DO YOU HAVE SIX OR MORE DRINKS ON ONE OCCASION: NEVER
HOW OFTEN DO YOU ATTEND CHURCH OR RELIGIOUS SERVICES?: 1 TO 4 TIMES PER YEAR
HOW HARD IS IT FOR YOU TO PAY FOR THE VERY BASICS LIKE FOOD, HOUSING, MEDICAL CARE, AND HEATING?: NOT HARD AT ALL
HOW OFTEN DO YOU ATTENT MEETINGS OF THE CLUB OR ORGANIZATION YOU BELONG TO?: MORE THAN 4 TIMES PER YEAR
HOW OFTEN DO YOU HAVE A DRINK CONTAINING ALCOHOL: MONTHLY OR LESS
WITHIN THE PAST 12 MONTHS, YOU WORRIED THAT YOUR FOOD WOULD RUN OUT BEFORE YOU GOT THE MONEY TO BUY MORE: NEVER TRUE
HOW OFTEN DO YOU GET TOGETHER WITH FRIENDS OR RELATIVES?: TWICE A WEEK
HOW OFTEN DO YOU ATTEND CHURCH OR RELIGIOUS SERVICES?: 1 TO 4 TIMES PER YEAR
HOW OFTEN DO YOU GET TOGETHER WITH FRIENDS OR RELATIVES?: TWICE A WEEK
HOW OFTEN DO YOU ATTENT MEETINGS OF THE CLUB OR ORGANIZATION YOU BELONG TO?: MORE THAN 4 TIMES PER YEAR
DO YOU BELONG TO ANY CLUBS OR ORGANIZATIONS SUCH AS CHURCH GROUPS UNIONS, FRATERNAL OR ATHLETIC GROUPS, OR SCHOOL GROUPS?: YES
IN A TYPICAL WEEK, HOW MANY TIMES DO YOU TALK ON THE PHONE WITH FAMILY, FRIENDS, OR NEIGHBORS?: MORE THAN THREE TIMES A WEEK

## 2023-05-08 ASSESSMENT — LIFESTYLE VARIABLES
HOW OFTEN DO YOU HAVE A DRINK CONTAINING ALCOHOL: MONTHLY OR LESS
HOW OFTEN DO YOU HAVE SIX OR MORE DRINKS ON ONE OCCASION: NEVER
AUDIT-C TOTAL SCORE: 1
SKIP TO QUESTIONS 9-10: 1
HOW MANY STANDARD DRINKS CONTAINING ALCOHOL DO YOU HAVE ON A TYPICAL DAY: 1 OR 2

## 2023-05-10 ENCOUNTER — OFFICE VISIT (OUTPATIENT)
Dept: MEDICAL GROUP | Facility: PHYSICIAN GROUP | Age: 78
End: 2023-05-10
Payer: MEDICARE

## 2023-05-10 VITALS
HEART RATE: 68 BPM | BODY MASS INDEX: 22.16 KG/M2 | TEMPERATURE: 98.9 F | SYSTOLIC BLOOD PRESSURE: 144 MMHG | WEIGHT: 129.8 LBS | OXYGEN SATURATION: 99 % | HEIGHT: 64 IN | DIASTOLIC BLOOD PRESSURE: 80 MMHG

## 2023-05-10 DIAGNOSIS — R73.03 PREDIABETES: ICD-10-CM

## 2023-05-10 DIAGNOSIS — I10 PRIMARY HYPERTENSION: ICD-10-CM

## 2023-05-10 DIAGNOSIS — F41.0 PANIC DISORDER WITHOUT AGORAPHOBIA: ICD-10-CM

## 2023-05-10 DIAGNOSIS — E78.00 PURE HYPERCHOLESTEROLEMIA: ICD-10-CM

## 2023-05-10 PROCEDURE — 99204 OFFICE O/P NEW MOD 45 MIN: CPT

## 2023-05-10 ASSESSMENT — ENCOUNTER SYMPTOMS
MYALGIAS: 0
NERVOUS/ANXIOUS: 1
DIARRHEA: 0
BLURRED VISION: 0
DIZZINESS: 0
WEIGHT LOSS: 0
COUGH: 0
CONSTIPATION: 0
HEADACHES: 0
FEVER: 0
DEPRESSION: 1
VOMITING: 0
ABDOMINAL PAIN: 0
CHILLS: 0
WEAKNESS: 0
NAUSEA: 0
SHORTNESS OF BREATH: 0

## 2023-05-10 ASSESSMENT — FIBROSIS 4 INDEX: FIB4 SCORE: 1.73

## 2023-05-10 ASSESSMENT — LIFESTYLE VARIABLES: SUBSTANCE_ABUSE: 0

## 2023-05-10 ASSESSMENT — PATIENT HEALTH QUESTIONNAIRE - PHQ9: CLINICAL INTERPRETATION OF PHQ2 SCORE: 0

## 2023-05-10 NOTE — PROGRESS NOTES
"Subjective:     CC:  Diagnoses of Primary hypertension, Panic disorder without agoraphobia, Prediabetes, and Pure hypercholesterolemia were pertinent to this visit.    HISTORY OF THE PRESENT ILLNESS: Patient is a 77 y.o. female. This pleasant patient is here today to establish care and discuss the following problems:    Problem   Primary Hypertension    Seeing cardiology-Dr. Fernandez  Taking Lisinopril 20 mg BID, propanolol 10 mg TID (prn panic attacks)  -BP today is 144/80  -Recent stressors: new move, recent loss of         Panic Disorder Without Agoraphobia    Reports to have panic disorder for which she is taking propanolol 10 mg TID prn and xananx 0.25 mg 1-2 times per day PRN  -Seeing therapist twice monthly  -Starting EMDR therapy  -Recent move and loss of -grief response are triggers           Health Maintenance: Completed    ROS:   Review of Systems   Constitutional:  Negative for chills, fever, malaise/fatigue and weight loss.   Eyes:  Negative for blurred vision.   Respiratory:  Negative for cough and shortness of breath.    Cardiovascular:  Negative for chest pain.   Gastrointestinal:  Negative for abdominal pain, constipation, diarrhea, nausea and vomiting.   Musculoskeletal:  Negative for myalgias.   Neurological:  Negative for dizziness, weakness and headaches.   Psychiatric/Behavioral:  Positive for depression. Negative for substance abuse and suicidal ideas. The patient is nervous/anxious.          Objective:     Exam: BP (!) 144/80 (BP Location: Left arm, Patient Position: Sitting, BP Cuff Size: Adult)   Pulse 68   Temp 37.2 °C (98.9 °F) (Temporal)   Ht 1.626 m (5' 4\")   Wt 58.9 kg (129 lb 12.8 oz)   SpO2 99%  Body mass index is 22.28 kg/m².    Physical Exam  Constitutional:       General: She is not in acute distress.     Appearance: Normal appearance. She is not ill-appearing or toxic-appearing.   HENT:      Head: Normocephalic.   Eyes:      Conjunctiva/sclera: Conjunctivae " normal.   Pulmonary:      Effort: Pulmonary effort is normal.   Skin:     General: Skin is warm and dry.   Neurological:      General: No focal deficit present.      Mental Status: She is alert and oriented to person, place, and time.   Psychiatric:         Mood and Affect: Mood normal.         Behavior: Behavior normal.           Labs:   Lab Results   Component Value Date/Time    CHOLSTRLTOT 199 03/20/2023 09:24 AM     (H) 03/20/2023 09:24 AM    HDL 71 03/20/2023 09:24 AM    TRIGLYCERIDE 106 03/20/2023 09:24 AM       Lab Results   Component Value Date/Time    SODIUM 131 (L) 04/28/2023 08:28 AM    POTASSIUM 5.5 04/28/2023 08:28 AM    CHLORIDE 99 04/28/2023 08:28 AM    CO2 23 04/28/2023 08:28 AM    GLUCOSE 68 04/28/2023 08:28 AM    BUN 23 (H) 04/28/2023 08:28 AM    CREATININE 1.01 04/28/2023 08:28 AM     Lab Results   Component Value Date/Time    ALKPHOSPHAT 44 03/21/2023 02:37 PM    ASTSGOT 25 03/21/2023 02:37 PM    ALTSGPT 15 03/21/2023 02:37 PM    TBILIRUBIN 0.4 03/21/2023 02:37 PM      Lab Results   Component Value Date/Time    HBA1C 5.7 (H) 03/20/2023 09:24 AM    HBA1C 5.7 (H) 07/16/2020 11:12 AM    HBA1C 5.7 (H) 11/06/2019 07:18 AM     No results found for: TSH  Lab Results   Component Value Date/Time    FREET4 1.45 03/20/2023 09:24 AM    FREET4 1.26 08/30/2022 12:57 PM         Assessment & Plan: Medical Decision Making   77 y.o. female with the following -    1. Primary hypertension  Chronic condition uncontrolled  -Patient with panic and recent stressors likely to exacerbate this condition recommend home blood pressure monitoring.  If levels greater than 140/90 consistently Will advised to follow-up with cardiology.  Today she will continue taking lisinopril 20 mg twice daily  - Comp Metabolic Panel; Future  - Lipid Profile; Future  - HEMOGLOBIN A1C; Future  - CBC WITH DIFFERENTIAL; Future    2. Panic disorder without agoraphobia  Chronic condition stable at this time  -Recommend continuation of  propanolol 10 mg 3 times daily if needed for panic response, recommend trying to eventually wean off Xanax as we discussed benzodiazepines are not ideal for elderly patients.  Discussed use of SSRIs as first-line treatment for this disorder.  - Comp Metabolic Panel; Future  - Lipid Profile; Future  - HEMOGLOBIN A1C; Future  - CBC WITH DIFFERENTIAL; Future    3. Prediabetes  Chronic condition stable  -Exercise: At least 150 minutes of moderate aerobic activity per week or 75 minutes of vigorous aerobic activity per week, +2 days/week of strength training  - Healthy lifestyle and eating habits: Mediterranean-based diet (rich in fruits, vegetables, nuts and healthy oils), proper hydration and avoiding sugary beverages, adequate sleep hygiene-(allowing 7 to 8 hours of overnight sleep).    - Comp Metabolic Panel; Future  - Lipid Profile; Future  - HEMOGLOBIN A1C; Future  - CBC WITH DIFFERENTIAL; Future    4. Pure hypercholesterolemia    -Chronic condition stable  - Comp Metabolic Panel; Future  - Lipid Profile; Future  - HEMOGLOBIN A1C; Future  - CBC WITH DIFFERENTIAL; Future        Differential diagnosis, natural history, supportive care, and indications for immediate follow-up discussed.  Shared decision making approach was utilized, and patient is amendable with plan of care.  Patient understands to return to clinic or go to the emergency department if symptoms worsen. All questions and concerns addressed to the best of my knowledge.      Return in about 3 months (around 8/10/2023) for F/U Lab Review.    Please note that this dictation was created using voice recognition software. I have made every reasonable attempt to correct obvious errors, but I expect that there are errors of grammar and possibly content that I did not discover before finalizing the note.

## 2023-06-21 ENCOUNTER — OFFICE VISIT (OUTPATIENT)
Dept: MEDICAL GROUP | Facility: PHYSICIAN GROUP | Age: 78
End: 2023-06-21
Payer: MEDICARE

## 2023-06-21 VITALS
HEART RATE: 68 BPM | SYSTOLIC BLOOD PRESSURE: 112 MMHG | TEMPERATURE: 98.8 F | HEIGHT: 64 IN | WEIGHT: 128.6 LBS | DIASTOLIC BLOOD PRESSURE: 60 MMHG | OXYGEN SATURATION: 94 % | BODY MASS INDEX: 21.95 KG/M2

## 2023-06-21 DIAGNOSIS — F41.0 PANIC DISORDER WITHOUT AGORAPHOBIA: ICD-10-CM

## 2023-06-21 DIAGNOSIS — M25.552 BILATERAL HIP PAIN: ICD-10-CM

## 2023-06-21 DIAGNOSIS — M54.50 ACUTE BILATERAL LOW BACK PAIN WITHOUT SCIATICA: ICD-10-CM

## 2023-06-21 DIAGNOSIS — M25.551 BILATERAL HIP PAIN: ICD-10-CM

## 2023-06-21 DIAGNOSIS — F41.9 ANXIETY: ICD-10-CM

## 2023-06-21 DIAGNOSIS — F51.04 PSYCHOPHYSIOLOGICAL INSOMNIA: ICD-10-CM

## 2023-06-21 PROCEDURE — 3074F SYST BP LT 130 MM HG: CPT

## 2023-06-21 PROCEDURE — 3078F DIAST BP <80 MM HG: CPT

## 2023-06-21 PROCEDURE — 99214 OFFICE O/P EST MOD 30 MIN: CPT

## 2023-06-21 RX ORDER — SERTRALINE HYDROCHLORIDE 25 MG/1
25 TABLET, FILM COATED ORAL DAILY
Qty: 30 TABLET | Refills: 11 | Status: SHIPPED | OUTPATIENT
Start: 2023-06-21 | End: 2023-07-07

## 2023-06-21 ASSESSMENT — PATIENT HEALTH QUESTIONNAIRE - PHQ9: CLINICAL INTERPRETATION OF PHQ2 SCORE: 0

## 2023-06-21 ASSESSMENT — ANXIETY QUESTIONNAIRES
GAD7 TOTAL SCORE: 3
4. TROUBLE RELAXING: NOT AT ALL
2. NOT BEING ABLE TO STOP OR CONTROL WORRYING: NOT AT ALL
6. BECOMING EASILY ANNOYED OR IRRITABLE: NOT AT ALL
3. WORRYING TOO MUCH ABOUT DIFFERENT THINGS: SEVERAL DAYS
7. FEELING AFRAID AS IF SOMETHING AWFUL MIGHT HAPPEN: SEVERAL DAYS
5. BEING SO RESTLESS THAT IT IS HARD TO SIT STILL: NOT AT ALL
1. FEELING NERVOUS, ANXIOUS, OR ON EDGE: SEVERAL DAYS

## 2023-06-21 ASSESSMENT — FIBROSIS 4 INDEX: FIB4 SCORE: 1.73

## 2023-06-22 ASSESSMENT — ENCOUNTER SYMPTOMS
MYALGIAS: 0
DIZZINESS: 0
DIARRHEA: 0
COUGH: 0
BLURRED VISION: 0
ABDOMINAL PAIN: 0
NERVOUS/ANXIOUS: 0
CONSTIPATION: 0
VOMITING: 0
HEADACHES: 0
CHILLS: 0
FEVER: 0
WEIGHT LOSS: 0
DEPRESSION: 1
NAUSEA: 0
SHORTNESS OF BREATH: 0
WEAKNESS: 0

## 2023-06-22 NOTE — PROGRESS NOTES
Subjective:     CC: Follow-up for anxiety    HPI:   Sara presents today with    Problem   Psychophysiological Insomnia    Chronic, stable  Taking Mirtazapine if needed for this condition, last taken one week ago.     Anxiety    REJI 7 = 3  Feeling much better  -Still would like to try Zoloft secondary to anxiety and grief response over loss of spouse last year to suicide ( 55 years)     Bilateral Hip Pain   Acute Bilateral Low Back Pain Without Sciatica    Patient reports 5 weeks of back and hip pain.  She feels this has been brought on by recent move where she has been packing and carrying a lot of boxes.  -She does have a history of bilateral hip replacement in the past with Dr. Lyons.  -She denies any numbness or tingling of legs or sciatica.  -She denies loss of bowel or bladder control or saddle anesthesia.  -She is requesting x-ray for this condition.     Panic Disorder Without Agoraphobia    Reports to have panic disorder for which she is taking propanolol 10 mg every other day.  and xananx 0.25 mg 1-2 times per day PRN (Last took 5/26)  -This has greatly improved since last visit in May  -Seeing therapist twice monthly  -Starting EMDR therapy  -Recent move and loss of -grief response are triggers         Health Maintenance: Declines    ROS:  Review of Systems   Constitutional:  Negative for chills, fever, malaise/fatigue and weight loss.   Eyes:  Negative for blurred vision.   Respiratory:  Negative for cough and shortness of breath.    Cardiovascular:  Negative for chest pain.   Gastrointestinal:  Negative for abdominal pain, constipation, diarrhea, nausea and vomiting.   Musculoskeletal:  Negative for myalgias.   Neurological:  Negative for dizziness, weakness and headaches.   Psychiatric/Behavioral:  Positive for depression. The patient is not nervous/anxious.        Objective:     Exam:  /60 (BP Location: Left arm, Patient Position: Sitting, BP Cuff Size: Adult)   Pulse 68   Temp  "37.1 °C (98.8 °F) (Temporal)   Ht 1.626 m (5' 4\")   Wt 58.3 kg (128 lb 9.6 oz)   SpO2 94%   BMI 22.07 kg/m²  Body mass index is 22.07 kg/m².    Physical Exam  Constitutional:       General: She is not in acute distress.     Appearance: Normal appearance. She is not ill-appearing or toxic-appearing.   HENT:      Head: Normocephalic.   Eyes:      Conjunctiva/sclera: Conjunctivae normal.   Pulmonary:      Effort: Pulmonary effort is normal.   Skin:     General: Skin is warm and dry.   Neurological:      General: No focal deficit present.      Mental Status: She is alert and oriented to person, place, and time.   Psychiatric:         Mood and Affect: Mood normal.         Behavior: Behavior normal.       Examination of the L-spine:  Full ROM motion of the l-spine  No midline spinous process tenderness. No tenderness to the paraspinous muscles of the L-spine.  No Tenderness to the SI joints. 5/5 strength of the illiosoas, 5/5 strength of the quads, 5/5 hamstrings, 5/5 PF, 5/5 DF. Lower Extremity DTRs intact. Able to rise up on toes and walk on heels. Able to stand on one leg. Sensation is grossly intact.   Examination of the Hip: Neg Straight leg raise eliciting back pain.  SHELBI. Neg Pain with internal, external rotation of the hip eliciting groin pain.       Labs:   Lab Results   Component Value Date/Time    CHOLSTRLTOT 199 03/20/2023 09:24 AM     (H) 03/20/2023 09:24 AM    HDL 71 03/20/2023 09:24 AM    TRIGLYCERIDE 106 03/20/2023 09:24 AM       Lab Results   Component Value Date/Time    SODIUM 131 (L) 04/28/2023 08:28 AM    POTASSIUM 5.5 04/28/2023 08:28 AM    CHLORIDE 99 04/28/2023 08:28 AM    CO2 23 04/28/2023 08:28 AM    GLUCOSE 68 04/28/2023 08:28 AM    BUN 23 (H) 04/28/2023 08:28 AM    CREATININE 1.01 04/28/2023 08:28 AM     Lab Results   Component Value Date/Time    ALKPHOSPHAT 44 03/21/2023 02:37 PM    ASTSGOT 25 03/21/2023 02:37 PM    ALTSGPT 15 03/21/2023 02:37 PM    TBILIRUBIN 0.4 03/21/2023 02:37 " PM      Lab Results   Component Value Date/Time    HBA1C 5.7 (H) 03/20/2023 09:24 AM    HBA1C 5.7 (H) 07/16/2020 11:12 AM    HBA1C 5.7 (H) 11/06/2019 07:18 AM     No results found for: TSH  Lab Results   Component Value Date/Time    FREET4 1.45 03/20/2023 09:24 AM    FREET4 1.26 08/30/2022 12:57 PM     No results found for: CBC      Assessment & Plan: Medical Decision Making     77 y.o. female with the following -     1. Panic disorder without agoraphobia  Chronic condition stable  -Patient to continue panel and Xanax if needed.  Requesting prescription today for Zoloft as discussed at last visit  SSRI dose discussed including side effects and black box warning of possible suicidal ideations.  Patient denies suicidal ideations at this time  - sertraline (ZOLOFT) 25 MG tablet; Take 1 Tablet by mouth every day.  Dispense: 30 Tablet; Refill: 11    2. Psychophysiological insomnia  Chronic condition stable  -Patient to continue Mirapex 7.5 mg nightly if needed for insomnia  -Sleep hygiene discussed    3. Anxiety  Chronic condition stable at this time    - sertraline (ZOLOFT) 25 MG tablet; Take 1 Tablet by mouth every day.  Dispense: 30 Tablet; Refill: 11    4. Bilateral hip pain  Acute condition-uncomplicated however will obtain x-rays  -RICE modalities encouraged including rest, ice and/or heat, NSAIDs if needed for pain as directed.  Patient declines referral to PT at this time  - DX-HIP-BILATERAL-WITH PELVIS-2 VIEWS; Future    5. Acute bilateral low back pain without sciatica  Acute condition uncomplicated  -As above RICE modalities encouraged  - DX-LUMBAR SPINE-2 OR 3 VIEWS; Future      Differential diagnosis, natural history, supportive care, and indications for immediate follow-up discussed.  Shared decision making approach utilized, and patient is amendable with plan of care.  Patient understands to return to clinic or go to the emergency department if symptoms worsen. All questions and concerns addressed to the  best of my knowledge.    Return in about 3 months (around 9/21/2023).    Please note that this dictation was created using voice recognition software. I have made every reasonable attempt to correct obvious errors, but I expect that there are errors of grammar and possibly content that I did not discover before finalizing the note.

## 2023-07-06 NOTE — PROGRESS NOTES
Chief Complaint   Patient presents with    Hypertension     Follow up          Subjective:   Tamiko Sandoval is a 77 y.o. female who presents today for follow-up.    Initially seen by me in April after she had an ER visit for hypertensive urgency.  She has been off of her medications for several days prior to the ER visit.  She also described several months of high stress and anxiety as she was moving from her long-term home, she was recently .  At the time I increased her lisinopril to twice daily.  She was using propanolol as needed for anxiety.    Tamiko is feeling much better since our last visit.  Her blood pressures were well controlled on the lisinopril twice daily.  Until about 1 week ago when she noticed her blood pressures were lower, with 1 reading 88/54, she was symptomatic.  Since then she has stopped taking the evening dose of lisinopril.  She took 1-2 blood pressure readings which were in the normal range.    At baseline she can walk several blocks without symptoms.    She completed her move and has much less anxiety.  She is not using the propanolol as often.  She is seeing a therapist.       Other medical problems include hypothyroidism, osteoporosis.     Father  at 82 yo of heart attack. Siblings with no heart disease.  She is a retired teacher.  She is recently .    Past Medical History:   Diagnosis Date    Arthritis     Osteoporosis     Pain 2012    left hip    Shingles 2010    Unspecified disorder of thyroid     hypothyroid         Past Surgical History:   Procedure Laterality Date    HIP ARTHROPLASTY TOTAL  12/10/2012    Performed by Gio Lyons M.D. at SURGERY [facility]    HIP ARTHROPLASTY TOTAL  2012    right; Performed by GIO LYONS at SURGERY [facility]    COLONOSCOPY  2004    HYSTERECTOMY, TOTAL ABDOMINAL  1977    OTHER ORTHOPEDIC SURGERY           Family History   Problem Relation Age of Onset    Lung Disease Mother     Heart  Disease Mother     Heart Disease Father     Heart Disease Paternal Aunt     Cancer Paternal Aunt     Hypertension Other     Stroke Other          Social History     Tobacco Use   Smoking Status Never   Smokeless Tobacco Never   Vaping Use    Vaping Use: Never used          Social History     Substance and Sexual Activity   Alcohol Use Yes    Alcohol/week: 1.5 oz    Types: 3 Standard drinks or equivalent per week    Comment: rare wine         No Known Allergies      Outpatient Encounter Medications as of 7/7/2023   Medication Sig Dispense Refill    lisinopril (PRINIVIL) 20 MG Tab Take 1 Tablet by mouth every day. 90 Tablet 3    METAMUCIL FIBER PO Take  by mouth.      Cholecalciferol (D3 2000) 2000 UNIT Cap Take 2,000 Units by mouth every day.      Non Formulary Request Take 1 Tablet by mouth every day. Calcium Citrate, Magnesium, and Zinc      Multiple Vitamins-Minerals (MULTI FOR HER 50+ PO) Take 1 Tablet by mouth every day.      Lutein-Zeaxanthin 25-5 MG Cap Take 1 Tablet by mouth every day.      denosumab (PROLIA) 60 MG/ML Solution Prefilled Syringe injection Inject 60 mg under the skin every 6 months. Last dose was 2/9/2023      TURMERIC PO Take 1,200 mg by mouth.      Coenzyme Q10 200 MG Cap Take 200 mg by mouth every day.      Omega-3 Fatty Acids (FISH OIL) 1000 MG Cap capsule Take 4,000 mg by mouth every day.      levothyroxine (SYNTHROID) 75 MCG Tab Take 75 mcg by mouth every day.      ALPRAZolam (XANAX) 0.25 MG Tab Take 0.25 mg by mouth every day. (Patient not taking: Reported on 7/7/2023)      [DISCONTINUED] sertraline (ZOLOFT) 25 MG tablet Take 1 Tablet by mouth every day. (Patient not taking: Reported on 7/7/2023) 30 Tablet 11    propranolol (INDERAL) 10 MG Tab Take 10 mg by mouth 3 times a day as needed. Palpitations, anxiety, one every other day (Patient not taking: Reported on 7/7/2023)      [DISCONTINUED] lisinopril (PRINIVIL) 20 MG Tab Take 1 Tablet by mouth 2 times a day. 180 Tablet 3     "[DISCONTINUED] mirtazapine (REMERON) 15 MG Tab Take 7.5 mg by mouth at bedtime as needed (For sleep). (Patient not taking: Reported on 7/7/2023)       No facility-administered encounter medications on file as of 7/7/2023.         Review of Systems   Constitutional:         No unexpected weight changes   Respiratory:  Negative for cough and shortness of breath.    Cardiovascular:  Negative for chest pain, palpitations, orthopnea, leg swelling and PND.   Neurological:  Negative for dizziness.   Psychiatric/Behavioral:  The patient is not nervous/anxious.           Objective:   /70 (BP Location: Left arm, Patient Position: Sitting)   Pulse 72   Resp 16   Ht 1.626 m (5' 4\")   Wt 58.1 kg (128 lb)   SpO2 97%   BMI 21.97 kg/m²        Physical Exam  Vitals reviewed.   Constitutional:       General: She is not in acute distress.     Appearance: Normal appearance.   HENT:      Head: Normocephalic and atraumatic.   Neck:      Vascular: No carotid bruit.   Cardiovascular:      Rate and Rhythm: Normal rate and regular rhythm.      Pulses: Normal pulses.      Heart sounds: No murmur heard.  Pulmonary:      Effort: Pulmonary effort is normal. No respiratory distress.      Breath sounds: Normal breath sounds. No rales.   Musculoskeletal:      Right lower leg: No edema.      Left lower leg: No edema.   Skin:     General: Skin is warm and dry.      Capillary Refill: Capillary refill takes less than 2 seconds.   Neurological:      Mental Status: She is alert and oriented to person, place, and time.      Gait: Gait normal.   Psychiatric:         Judgment: Judgment normal.            Assessment:     1. Primary hypertension  lisinopril (PRINIVIL) 20 MG Tab           Medical Decision Making:  Today's Assessment / Plan:     Hypertension  Palpitations  - suspect a combination of primary hypertension due to atherosclerosis as well as secondary due to anxiety.  The anxiety component is now well controlled.  Continue to use " propanolol as needed.  -Reduce the lisinopril to 20 mg daily.  Goal BP<140/90, ideally less than 130/80 without over medicating her     RTC as needed

## 2023-07-07 ENCOUNTER — OFFICE VISIT (OUTPATIENT)
Dept: CARDIOLOGY | Facility: MEDICAL CENTER | Age: 78
End: 2023-07-07
Attending: PHYSICIAN ASSISTANT
Payer: MEDICARE

## 2023-07-07 VITALS
DIASTOLIC BLOOD PRESSURE: 70 MMHG | BODY MASS INDEX: 21.85 KG/M2 | OXYGEN SATURATION: 97 % | HEIGHT: 64 IN | HEART RATE: 72 BPM | SYSTOLIC BLOOD PRESSURE: 130 MMHG | RESPIRATION RATE: 16 BRPM | WEIGHT: 128 LBS

## 2023-07-07 DIAGNOSIS — I10 PRIMARY HYPERTENSION: ICD-10-CM

## 2023-07-07 DIAGNOSIS — R00.2 PALPITATIONS: ICD-10-CM

## 2023-07-07 PROCEDURE — 99213 OFFICE O/P EST LOW 20 MIN: CPT | Performed by: PHYSICIAN ASSISTANT

## 2023-07-07 PROCEDURE — 3075F SYST BP GE 130 - 139MM HG: CPT | Performed by: PHYSICIAN ASSISTANT

## 2023-07-07 PROCEDURE — 3078F DIAST BP <80 MM HG: CPT | Performed by: PHYSICIAN ASSISTANT

## 2023-07-07 PROCEDURE — 99212 OFFICE O/P EST SF 10 MIN: CPT | Performed by: PHYSICIAN ASSISTANT

## 2023-07-07 RX ORDER — LISINOPRIL 20 MG/1
20 TABLET ORAL DAILY
Qty: 90 TABLET | Refills: 3 | Status: SHIPPED | OUTPATIENT
Start: 2023-07-07

## 2023-07-07 RX ORDER — ALPRAZOLAM 0.25 MG/1
0.25 TABLET ORAL
COMMUNITY
Start: 2023-05-12 | End: 2024-02-13

## 2023-07-07 ASSESSMENT — ENCOUNTER SYMPTOMS
PALPITATIONS: 0
DIZZINESS: 0
SHORTNESS OF BREATH: 0
ORTHOPNEA: 0
COUGH: 0
NERVOUS/ANXIOUS: 0
PND: 0

## 2023-07-07 ASSESSMENT — FIBROSIS 4 INDEX: FIB4 SCORE: 1.73

## 2023-07-07 NOTE — PATIENT INSTRUCTIONS
Goal is blood pressure less than 140/90, better if less than 130/80. Continue the Lisinopril 20mg once a day. If your blood pressures are consistently higher than 140/90 at home over 1-2 weeks then restart Lisinopril twice a day

## 2023-07-11 ENCOUNTER — HOSPITAL ENCOUNTER (OUTPATIENT)
Dept: RADIOLOGY | Facility: MEDICAL CENTER | Age: 78
End: 2023-07-11
Payer: MEDICARE

## 2023-07-11 DIAGNOSIS — M25.552 BILATERAL HIP PAIN: ICD-10-CM

## 2023-07-11 DIAGNOSIS — M25.551 BILATERAL HIP PAIN: ICD-10-CM

## 2023-07-11 DIAGNOSIS — M54.50 ACUTE BILATERAL LOW BACK PAIN WITHOUT SCIATICA: ICD-10-CM

## 2023-07-11 PROCEDURE — 72100 X-RAY EXAM L-S SPINE 2/3 VWS: CPT

## 2023-07-11 PROCEDURE — 73521 X-RAY EXAM HIPS BI 2 VIEWS: CPT

## 2023-07-18 ENCOUNTER — TELEPHONE (OUTPATIENT)
Dept: RHEUMATOLOGY | Facility: MEDICAL CENTER | Age: 78
End: 2023-07-18

## 2023-07-18 ENCOUNTER — HOSPITAL ENCOUNTER (OUTPATIENT)
Dept: RADIOLOGY | Facility: MEDICAL CENTER | Age: 78
End: 2023-07-18
Attending: INTERNAL MEDICINE
Payer: MEDICARE

## 2023-07-18 ENCOUNTER — OFFICE VISIT (OUTPATIENT)
Dept: RHEUMATOLOGY | Facility: MEDICAL CENTER | Age: 78
End: 2023-07-18
Attending: INTERNAL MEDICINE
Payer: MEDICARE

## 2023-07-18 VITALS
SYSTOLIC BLOOD PRESSURE: 130 MMHG | TEMPERATURE: 97.4 F | WEIGHT: 131 LBS | OXYGEN SATURATION: 99 % | HEART RATE: 68 BPM | DIASTOLIC BLOOD PRESSURE: 60 MMHG | RESPIRATION RATE: 14 BRPM | BODY MASS INDEX: 22.49 KG/M2

## 2023-07-18 DIAGNOSIS — E55.9 VITAMIN D DEFICIENCY: ICD-10-CM

## 2023-07-18 DIAGNOSIS — Z79.899 ENCOUNTER FOR MONITORING DENOSUMAB THERAPY: ICD-10-CM

## 2023-07-18 DIAGNOSIS — M79.672 FOOT PAIN, LEFT: ICD-10-CM

## 2023-07-18 DIAGNOSIS — E03.9 HYPOTHYROIDISM, UNSPECIFIED TYPE: ICD-10-CM

## 2023-07-18 DIAGNOSIS — M81.0 OSTEOPOROSIS, POST-MENOPAUSAL: ICD-10-CM

## 2023-07-18 DIAGNOSIS — Z51.81 ENCOUNTER FOR MONITORING DENOSUMAB THERAPY: ICD-10-CM

## 2023-07-18 DIAGNOSIS — M15.9 PRIMARY OSTEOARTHRITIS INVOLVING MULTIPLE JOINTS: ICD-10-CM

## 2023-07-18 PROCEDURE — 99214 OFFICE O/P EST MOD 30 MIN: CPT | Performed by: INTERNAL MEDICINE

## 2023-07-18 PROCEDURE — 99212 OFFICE O/P EST SF 10 MIN: CPT | Performed by: INTERNAL MEDICINE

## 2023-07-18 PROCEDURE — 73630 X-RAY EXAM OF FOOT: CPT | Mod: LT

## 2023-07-18 PROCEDURE — 3078F DIAST BP <80 MM HG: CPT | Performed by: INTERNAL MEDICINE

## 2023-07-18 PROCEDURE — 3075F SYST BP GE 130 - 139MM HG: CPT | Performed by: INTERNAL MEDICINE

## 2023-07-18 RX ORDER — DIPHENHYDRAMINE HYDROCHLORIDE 50 MG/ML
50 INJECTION INTRAMUSCULAR; INTRAVENOUS PRN
Status: CANCELLED | OUTPATIENT
Start: 2023-08-08

## 2023-07-18 RX ORDER — METHYLPREDNISOLONE SODIUM SUCCINATE 125 MG/2ML
125 INJECTION, POWDER, LYOPHILIZED, FOR SOLUTION INTRAMUSCULAR; INTRAVENOUS PRN
Status: CANCELLED | OUTPATIENT
Start: 2023-08-08

## 2023-07-18 RX ORDER — EPINEPHRINE 1 MG/ML(1)
0.5 AMPUL (ML) INJECTION PRN
Status: CANCELLED | OUTPATIENT
Start: 2023-08-08

## 2023-07-18 ASSESSMENT — FIBROSIS 4 INDEX: FIB4 SCORE: 1.73

## 2023-07-18 NOTE — PROGRESS NOTES
Chief Complaint- joint pain     Subjective:   Sara Sandoval is a 77 y.o. female here today for follow up of rheumatological issues    This is a follow-up visit for this patient who we see in this clinic for osteoporosis.  Patient's last bone density scan August 2022 indicates osteoporosis by T-scores.  Patient is currently on Prolia 60 mg subcu every 6 months with benefit.  Patient denies any side effects from the medication, denies any unexplained weight loss, denies any fevers of unknown etiology, denies any GI upset, denies any rashes, denies any new joint swelling, denies recurrent infections.     Additional issue today patient and her foot at home about 3 weeks ago continues to have some pain in her left fourth toe metatarsal area, wonders about a broken bone and wonders about getting an x-ray today.     Of note patient status post bilateral hip replacements and was told by her orthopedic surgeon that it was imperative that she keep her bones strong so as to maintain her bilateral hip replacements. Patient doesn't really have much complaints, is doing exercise programs to maintain her strength.      Bilateral CHLOE     S/p fosamax [id number]  S/p actonel until 2010     DEXA 1/2014 T scores -1.5, -3.0  DEXA 1/2016 T scores -2.0, -1.9     DEXA 3/20/2018 T scores -2.2, -2.0    DEXA 6/2/2020 T scores -2.5, -1.8  FRAX 6/2/2020 not done   DEXA 6/21/2022 T scores -2.8, -2.5  FRAX 6/21/2022 not done  On Prolia since 6/2022      Current Outpatient Medications   Medication Sig Dispense Refill    lisinopril (PRINIVIL) 20 MG Tab Take 1 Tablet by mouth every day. 90 Tablet 3    METAMUCIL FIBER PO Take  by mouth.      Cholecalciferol (D3 2000) 2000 UNIT Cap Take 2,000 Units by mouth every day.      Non Formulary Request Take 1 Tablet by mouth every day. Calcium Citrate, Magnesium, and Zinc      Multiple Vitamins-Minerals (MULTI FOR HER 50+ PO) Take 1 Tablet by mouth every day.      Lutein-Zeaxanthin 25-5 MG Cap Take 1  Tablet by mouth every day.      denosumab (PROLIA) 60 MG/ML Solution Prefilled Syringe injection Inject 60 mg under the skin every 6 months. Last dose was 2/9/2023      TURMERIC PO Take 1,200 mg by mouth.      Coenzyme Q10 200 MG Cap Take 200 mg by mouth every day.      Omega-3 Fatty Acids (FISH OIL) 1000 MG Cap capsule Take 4,000 mg by mouth every day.      levothyroxine (SYNTHROID) 75 MCG Tab Take 75 mcg by mouth every day.      ALPRAZolam (XANAX) 0.25 MG Tab Take 0.25 mg by mouth every day. (Patient not taking: Reported on 7/7/2023)      propranolol (INDERAL) 10 MG Tab Take 10 mg by mouth 3 times a day as needed. Palpitations, anxiety, one every other day (Patient not taking: Reported on 7/7/2023)       No current facility-administered medications for this visit.     She  has a past medical history of Arthritis, Osteoporosis, Pain (06/11/2012), Shingles (01/01/2010), and Unspecified disorder of thyroid.    ROS   Other than what is mentioned in HPI or physical exam, there is no history of headaches, double vision or blurred vision.  No trouble swallowing difficulties .  No chest complaints including chest pain, cough or sputum production. No GI complaints including nausea, vomiting, change in bowel habits, or past peptic ulcer disease. No history of blood in the stools. No urinary complaints including dysuria or frequency. No history of alopecia, photosensitivity     Objective:     /60   Pulse 68   Temp 36.3 °C (97.4 °F) (Temporal)   Resp 14   Wt 59.4 kg (131 lb)   SpO2 99%  Body mass index is 22.49 kg/m².   Physical Exam:    Constitutional: Alert and oriented X3, patient is talkative with good eye contact.Skin: Warm, dry, good turgor, no rashes in visible areas.Eye: Equal, round and reactive, conjunctiva clear, lids normal EOM intactENMT: Lips without lesions,  pinna without deformityNeck: Trachea midline, no masses, no thyromegaly.Lymph:  No cervical lymphadenopathy, no axillary lymphadenopathy, no  supraclavicular lymphadenopathyRespiratory: Unlabored respiratory effort, lungs clear to auscultation, no wheezes, no ronchi., .Abdomen: Soft, non-distended.Psych: Alert and oriented x3, normal affect and mood.Neuro: Cranial nerves 2-12 are grossly intact Ext:no joint laxity noted in bilateral arms, no joint laxity noted in bilateral legs, left fourth toe slightly swollen, tender to palpation along left fourth toe metatarsal, no lymphangitis,    Lab Results   Component Value Date/Time    HEPCAB Non-Reactive 03/20/2023 09:24 AM     Lab Results   Component Value Date/Time    SODIUM 131 (L) 04/28/2023 08:28 AM    POTASSIUM 5.5 04/28/2023 08:28 AM    CHLORIDE 99 04/28/2023 08:28 AM    CO2 23 04/28/2023 08:28 AM    GLUCOSE 68 04/28/2023 08:28 AM    BUN 23 (H) 04/28/2023 08:28 AM    CREATININE 1.01 04/28/2023 08:28 AM      Lab Results   Component Value Date/Time    WBC 8.7 03/21/2023 02:37 PM    RBC 4.01 (L) 03/21/2023 02:37 PM    HEMOGLOBIN 12.9 03/21/2023 02:37 PM    HEMATOCRIT 38.6 03/21/2023 02:37 PM    MCV 96.3 03/21/2023 02:37 PM    MCH 32.2 03/21/2023 02:37 PM    MCHC 33.4 (L) 03/21/2023 02:37 PM    MPV 9.2 03/21/2023 02:37 PM    NEUTSPOLYS 71.10 03/21/2023 02:37 PM    LYMPHOCYTES 20.70 (L) 03/21/2023 02:37 PM    MONOCYTES 7.20 03/21/2023 02:37 PM    EOSINOPHILS 0.20 03/21/2023 02:37 PM    BASOPHILS 0.50 03/21/2023 02:37 PM      Lab Results   Component Value Date/Time    CALCIUM 8.9 04/28/2023 08:28 AM    ASTSGOT 25 03/21/2023 02:37 PM    ALTSGPT 15 03/21/2023 02:37 PM    ALKPHOSPHAT 44 03/21/2023 02:37 PM    TBILIRUBIN 0.4 03/21/2023 02:37 PM    ALBUMIN 4.3 03/21/2023 02:37 PM    TOTPROTEIN 7.3 03/21/2023 02:37 PM     Lab Results   Component Value Date/Time    SEDRATEWES 16 09/07/2018 02:39 PM     Lab Results   Component Value Date/Time    HBA1C 5.7 (H) 03/20/2023 09:24 AM     Lab Results   Component Value Date/Time    PTHINTACT 51.4 07/16/2020 11:12 AM     Assessment and Plan:     1. Osteoporosis,  post-menopausal  Clinically stable, no fractures since last visit, continue Prolia 60 mg subcu every 6 months, today check vitamin D level.  Next DEXA June 2024.   continue calcium citrate 1200 mg by mouth daily and vitamin D about 2000 units by mouth daily and magnesium 200 mg by mouth daily  - DX-FOOT-COMPLETE 3+ LEFT; Future  - VITAMIN D,25 HYDROXY (DEFICIENCY); Future    2. Encounter for monitoring denosumab therapy  Currently on Prolia 60 mg subcu every 6 months since about June 2022  - DX-FOOT-COMPLETE 3+ LEFT; Future  - VITAMIN D,25 HYDROXY (DEFICIENCY); Future    3. Foot pain, left  Today do x-ray to rule out fracture if positive fracture send to [facility] urgent care for walking boot.  - DX-FOOT-COMPLETE 3+ LEFT; Future    4. Primary osteoarthritis involving multiple joints  Patient uses Tylenol as needed  - DX-FOOT-COMPLETE 3+ LEFT; Future    5. Hypothyroidism, unspecified type  Followed by patients PCP, can exacerbate joint pain, I recommend monitoring thyroid on a regular basis to assure it is not contributing to the patient's joint pain    6. Vitamin D deficiency  Today check vitamin D levels  - VITAMIN D,25 HYDROXY (DEFICIENCY); Future    Followup: Return in about 1 year (around 7/18/2024). or sooner jarett Sandoval  was seen 30 minutes face-to-face of which more than 50% of the time was spent counseling the patient (excluding time for procedures)  regarding  rheumatological condition and care. Therapy was discussed in detail.      Please note that this dictation was created using voice recognition software. I have made every reasonable attempt to correct obvious errors, but I expect that there are errors of grammar and possibly content that I did not discover before finalizing the note.

## 2023-08-01 ENCOUNTER — APPOINTMENT (RX ONLY)
Dept: URBAN - METROPOLITAN AREA CLINIC 6 | Facility: CLINIC | Age: 78
Setting detail: DERMATOLOGY
End: 2023-08-01

## 2023-08-01 DIAGNOSIS — L73.9 FOLLICULAR DISORDER, UNSPECIFIED: ICD-10-CM

## 2023-08-01 DIAGNOSIS — L738 OTHER SPECIFIED DISEASES OF HAIR AND HAIR FOLLICLES: ICD-10-CM

## 2023-08-01 DIAGNOSIS — L57.0 ACTINIC KERATOSIS: ICD-10-CM

## 2023-08-01 DIAGNOSIS — L82.1 OTHER SEBORRHEIC KERATOSIS: ICD-10-CM

## 2023-08-01 DIAGNOSIS — D22 MELANOCYTIC NEVI: ICD-10-CM

## 2023-08-01 DIAGNOSIS — Z85.828 PERSONAL HISTORY OF OTHER MALIGNANT NEOPLASM OF SKIN: ICD-10-CM

## 2023-08-01 DIAGNOSIS — Z71.89 OTHER SPECIFIED COUNSELING: ICD-10-CM

## 2023-08-01 DIAGNOSIS — D18.0 HEMANGIOMA: ICD-10-CM

## 2023-08-01 DIAGNOSIS — L663 OTHER SPECIFIED DISEASES OF HAIR AND HAIR FOLLICLES: ICD-10-CM

## 2023-08-01 DIAGNOSIS — L81.4 OTHER MELANIN HYPERPIGMENTATION: ICD-10-CM

## 2023-08-01 PROBLEM — L02.423 FURUNCLE OF RIGHT UPPER LIMB: Status: ACTIVE | Noted: 2023-08-01

## 2023-08-01 PROBLEM — D22.62 MELANOCYTIC NEVI OF LEFT UPPER LIMB, INCLUDING SHOULDER: Status: ACTIVE | Noted: 2023-08-01

## 2023-08-01 PROBLEM — D22.5 MELANOCYTIC NEVI OF TRUNK: Status: ACTIVE | Noted: 2023-08-01

## 2023-08-01 PROBLEM — D18.01 HEMANGIOMA OF SKIN AND SUBCUTANEOUS TISSUE: Status: ACTIVE | Noted: 2023-08-01

## 2023-08-01 PROCEDURE — ? OBSERVATION

## 2023-08-01 PROCEDURE — ? SUNSCREEN RECOMMENDATIONS

## 2023-08-01 PROCEDURE — ? COUNSELING

## 2023-08-01 PROCEDURE — 17003 DESTRUCT PREMALG LES 2-14: CPT

## 2023-08-01 PROCEDURE — 99213 OFFICE O/P EST LOW 20 MIN: CPT | Mod: 25

## 2023-08-01 PROCEDURE — ? SUNSCREEN TREATMENT REGIMEN

## 2023-08-01 PROCEDURE — 17000 DESTRUCT PREMALG LESION: CPT

## 2023-08-01 PROCEDURE — ? LIQUID NITROGEN

## 2023-08-01 ASSESSMENT — LOCATION SIMPLE DESCRIPTION DERM
LOCATION SIMPLE: CHEST
LOCATION SIMPLE: LEFT EYELID
LOCATION SIMPLE: LEFT FOREARM
LOCATION SIMPLE: LEFT ELBOW
LOCATION SIMPLE: RIGHT HAND
LOCATION SIMPLE: LEFT HAND
LOCATION SIMPLE: ABDOMEN
LOCATION SIMPLE: NOSE
LOCATION SIMPLE: RIGHT FOREARM

## 2023-08-01 ASSESSMENT — LOCATION DETAILED DESCRIPTION DERM
LOCATION DETAILED: RIGHT PROXIMAL RADIAL DORSAL FOREARM
LOCATION DETAILED: RIGHT VENTRAL DISTAL FOREARM
LOCATION DETAILED: UPPER STERNUM
LOCATION DETAILED: LEFT ELBOW
LOCATION DETAILED: RIGHT RADIAL DORSAL HAND
LOCATION DETAILED: EPIGASTRIC SKIN
LOCATION DETAILED: MIDDLE STERNUM
LOCATION DETAILED: RIGHT DORSAL MIDDLE FINGER METACARPOPHALANGEAL JOINT
LOCATION DETAILED: LEFT MEDIAL CANTHUS
LOCATION DETAILED: LEFT DORSAL RING FINGER METACARPOPHALANGEAL JOINT
LOCATION DETAILED: RIGHT DISTAL DORSAL FOREARM
LOCATION DETAILED: 3RD WEB SPACE LEFT HAND
LOCATION DETAILED: RIGHT DISTAL RADIAL DORSAL FOREARM
LOCATION DETAILED: RIGHT PROXIMAL DORSAL FOREARM
LOCATION DETAILED: LEFT RADIAL DORSAL HAND
LOCATION DETAILED: LEFT PROXIMAL DORSAL FOREARM
LOCATION DETAILED: NASAL DORSUM
LOCATION DETAILED: SUBXIPHOID

## 2023-08-01 ASSESSMENT — LOCATION ZONE DERM
LOCATION ZONE: HAND
LOCATION ZONE: TRUNK
LOCATION ZONE: NOSE
LOCATION ZONE: ARM
LOCATION ZONE: EYELID

## 2023-08-01 NOTE — PROCEDURE: LIQUID NITROGEN
Post-Care Instructions: I reviewed with the patient in detail post-care instructions. Patient is to wear sunprotection, and avoid picking at any of the treated lesions. Pt may apply Vaseline to crusted or scabbing areas.
Duration Of Freeze Thaw-Cycle (Seconds): 10
Detail Level: Detailed
Show Aperture Variable?: Yes
Consent: The patient's consent was obtained including but not limited to risks of crusting, scabbing, blistering, scarring, darker or lighter pigmentary change, recurrence, incomplete removal and infection.
Render Note In Bullet Format When Appropriate: No
Number Of Freeze-Thaw Cycles: 2 freeze-thaw cycles

## 2023-08-10 ENCOUNTER — OUTPATIENT INFUSION SERVICES (OUTPATIENT)
Dept: ONCOLOGY | Facility: MEDICAL CENTER | Age: 78
End: 2023-08-10
Attending: INTERNAL MEDICINE
Payer: MEDICARE

## 2023-08-10 VITALS
HEIGHT: 63 IN | TEMPERATURE: 98.3 F | SYSTOLIC BLOOD PRESSURE: 115 MMHG | DIASTOLIC BLOOD PRESSURE: 60 MMHG | RESPIRATION RATE: 16 BRPM | BODY MASS INDEX: 23.44 KG/M2 | WEIGHT: 132.28 LBS | HEART RATE: 61 BPM | OXYGEN SATURATION: 97 %

## 2023-08-10 DIAGNOSIS — M81.0 OSTEOPOROSIS, POST-MENOPAUSAL: ICD-10-CM

## 2023-08-10 LAB
CA-I BLD ISE-SCNC: 1.29 MMOL/L (ref 1.1–1.3)
CREAT BLD-MCNC: 0.9 MG/DL (ref 0.5–1.4)

## 2023-08-10 PROCEDURE — 82330 ASSAY OF CALCIUM: CPT

## 2023-08-10 PROCEDURE — 700111 HCHG RX REV CODE 636 W/ 250 OVERRIDE (IP): Mod: JZ,JG | Performed by: INTERNAL MEDICINE

## 2023-08-10 PROCEDURE — 96372 THER/PROPH/DIAG INJ SC/IM: CPT

## 2023-08-10 PROCEDURE — 82565 ASSAY OF CREATININE: CPT

## 2023-08-10 RX ORDER — METHYLPREDNISOLONE SODIUM SUCCINATE 125 MG/2ML
125 INJECTION, POWDER, LYOPHILIZED, FOR SOLUTION INTRAMUSCULAR; INTRAVENOUS PRN
Status: CANCELLED | OUTPATIENT
Start: 2024-02-04

## 2023-08-10 RX ORDER — DIPHENHYDRAMINE HYDROCHLORIDE 50 MG/ML
50 INJECTION INTRAMUSCULAR; INTRAVENOUS PRN
Status: CANCELLED | OUTPATIENT
Start: 2024-02-04

## 2023-08-10 RX ORDER — EPINEPHRINE 1 MG/ML(1)
0.5 AMPUL (ML) INJECTION PRN
Status: CANCELLED | OUTPATIENT
Start: 2024-02-04

## 2023-08-10 RX ADMIN — DENOSUMAB 60 MG: 60 INJECTION SUBCUTANEOUS at 16:02

## 2023-08-10 ASSESSMENT — FIBROSIS 4 INDEX: FIB4 SCORE: 1.73

## 2023-08-10 NOTE — PROGRESS NOTES
Pt arrives to [facility] for Prolia.  Pt denies any s/sx of infection or recent/planned dental procedures.  Patient taking calcium and vitamin D as ordered. ISTAT calcium and creatinine drawn via 22G butterfly needle to right AC.  Labs reviewed and ok to give Prolia per pharmacy.  Prolia given SC to back of left arm.  Email sent to scheduling dept to set up next appt in 6 months.  Pt dc home to self care in stable condition.

## 2023-11-29 ENCOUNTER — PATIENT MESSAGE (OUTPATIENT)
Dept: HEALTH INFORMATION MANAGEMENT | Facility: OTHER | Age: 78
End: 2023-11-29

## 2023-12-13 ENCOUNTER — HOSPITAL ENCOUNTER (OUTPATIENT)
Dept: LAB | Facility: MEDICAL CENTER | Age: 78
End: 2023-12-13
Attending: FAMILY MEDICINE
Payer: MEDICARE

## 2023-12-13 ENCOUNTER — HOSPITAL ENCOUNTER (OUTPATIENT)
Dept: LAB | Facility: MEDICAL CENTER | Age: 78
End: 2023-12-13
Payer: MEDICARE

## 2023-12-13 DIAGNOSIS — Z79.899 ENCOUNTER FOR MONITORING DENOSUMAB THERAPY: ICD-10-CM

## 2023-12-13 DIAGNOSIS — I10 PRIMARY HYPERTENSION: ICD-10-CM

## 2023-12-13 DIAGNOSIS — M81.0 OSTEOPOROSIS, POST-MENOPAUSAL: ICD-10-CM

## 2023-12-13 DIAGNOSIS — E78.00 PURE HYPERCHOLESTEROLEMIA: ICD-10-CM

## 2023-12-13 DIAGNOSIS — E55.9 VITAMIN D DEFICIENCY: ICD-10-CM

## 2023-12-13 DIAGNOSIS — F41.0 PANIC DISORDER WITHOUT AGORAPHOBIA: ICD-10-CM

## 2023-12-13 DIAGNOSIS — Z51.81 ENCOUNTER FOR MONITORING DENOSUMAB THERAPY: ICD-10-CM

## 2023-12-13 DIAGNOSIS — R73.03 PREDIABETES: ICD-10-CM

## 2023-12-13 LAB
25(OH)D3 SERPL-MCNC: 56 NG/ML (ref 30–100)
ALBUMIN SERPL BCP-MCNC: 4 G/DL (ref 3.2–4.9)
ALBUMIN/GLOB SERPL: 1.4 G/DL
ALP SERPL-CCNC: 51 U/L (ref 30–99)
ALT SERPL-CCNC: 15 U/L (ref 2–50)
ANION GAP SERPL CALC-SCNC: 9 MMOL/L (ref 7–16)
AST SERPL-CCNC: 18 U/L (ref 12–45)
BASOPHILS # BLD AUTO: 0.8 % (ref 0–1.8)
BASOPHILS # BLD: 0.05 K/UL (ref 0–0.12)
BILIRUB SERPL-MCNC: 0.2 MG/DL (ref 0.1–1.5)
BUN SERPL-MCNC: 19 MG/DL (ref 8–22)
CALCIUM ALBUM COR SERPL-MCNC: 8.7 MG/DL (ref 8.5–10.5)
CALCIUM SERPL-MCNC: 8.7 MG/DL (ref 8.5–10.5)
CHLORIDE SERPL-SCNC: 108 MMOL/L (ref 96–112)
CHOLEST SERPL-MCNC: 189 MG/DL (ref 100–199)
CO2 SERPL-SCNC: 25 MMOL/L (ref 20–33)
CREAT SERPL-MCNC: 0.88 MG/DL (ref 0.5–1.4)
EOSINOPHIL # BLD AUTO: 0.17 K/UL (ref 0–0.51)
EOSINOPHIL NFR BLD: 2.7 % (ref 0–6.9)
ERYTHROCYTE [DISTWIDTH] IN BLOOD BY AUTOMATED COUNT: 49.1 FL (ref 35.9–50)
EST. AVERAGE GLUCOSE BLD GHB EST-MCNC: 120 MG/DL
FASTING STATUS PATIENT QL REPORTED: NORMAL
GFR SERPLBLD CREATININE-BSD FMLA CKD-EPI: 66 ML/MIN/1.73 M 2
GLOBULIN SER CALC-MCNC: 2.9 G/DL (ref 1.9–3.5)
GLUCOSE SERPL-MCNC: 92 MG/DL (ref 65–99)
HBA1C MFR BLD: 5.8 % (ref 4–5.6)
HCT VFR BLD AUTO: 36.9 % (ref 37–47)
HDLC SERPL-MCNC: 70 MG/DL
HGB BLD-MCNC: 11.9 G/DL (ref 12–16)
IMM GRANULOCYTES # BLD AUTO: 0.01 K/UL (ref 0–0.11)
IMM GRANULOCYTES NFR BLD AUTO: 0.2 % (ref 0–0.9)
LDLC SERPL CALC-MCNC: 104 MG/DL
LYMPHOCYTES # BLD AUTO: 2.06 K/UL (ref 1–4.8)
LYMPHOCYTES NFR BLD: 33.1 % (ref 22–41)
MCH RBC QN AUTO: 31.2 PG (ref 27–33)
MCHC RBC AUTO-ENTMCNC: 32.2 G/DL (ref 32.2–35.5)
MCV RBC AUTO: 96.6 FL (ref 81.4–97.8)
MONOCYTES # BLD AUTO: 0.42 K/UL (ref 0–0.85)
MONOCYTES NFR BLD AUTO: 6.8 % (ref 0–13.4)
NEUTROPHILS # BLD AUTO: 3.51 K/UL (ref 1.82–7.42)
NEUTROPHILS NFR BLD: 56.4 % (ref 44–72)
NRBC # BLD AUTO: 0 K/UL
NRBC BLD-RTO: 0 /100 WBC (ref 0–0.2)
PLATELET # BLD AUTO: 283 K/UL (ref 164–446)
PMV BLD AUTO: 9.4 FL (ref 9–12.9)
POTASSIUM SERPL-SCNC: 4.9 MMOL/L (ref 3.6–5.5)
PROT SERPL-MCNC: 6.9 G/DL (ref 6–8.2)
RBC # BLD AUTO: 3.82 M/UL (ref 4.2–5.4)
SODIUM SERPL-SCNC: 142 MMOL/L (ref 135–145)
TRIGL SERPL-MCNC: 74 MG/DL (ref 0–149)
WBC # BLD AUTO: 6.2 K/UL (ref 4.8–10.8)

## 2023-12-13 PROCEDURE — 82306 VITAMIN D 25 HYDROXY: CPT

## 2023-12-13 PROCEDURE — 80061 LIPID PANEL: CPT

## 2023-12-13 PROCEDURE — 83036 HEMOGLOBIN GLYCOSYLATED A1C: CPT | Mod: GA

## 2023-12-13 PROCEDURE — 36415 COLL VENOUS BLD VENIPUNCTURE: CPT

## 2023-12-13 PROCEDURE — 85025 COMPLETE CBC W/AUTO DIFF WBC: CPT

## 2023-12-13 PROCEDURE — 80053 COMPREHEN METABOLIC PANEL: CPT

## 2024-01-30 ENCOUNTER — APPOINTMENT (RX ONLY)
Dept: URBAN - METROPOLITAN AREA CLINIC 6 | Facility: CLINIC | Age: 79
Setting detail: DERMATOLOGY
End: 2024-01-30

## 2024-01-30 DIAGNOSIS — L82.1 OTHER SEBORRHEIC KERATOSIS: ICD-10-CM

## 2024-01-30 DIAGNOSIS — Z85.828 PERSONAL HISTORY OF OTHER MALIGNANT NEOPLASM OF SKIN: ICD-10-CM

## 2024-01-30 DIAGNOSIS — L72.0 EPIDERMAL CYST: ICD-10-CM

## 2024-01-30 DIAGNOSIS — D22 MELANOCYTIC NEVI: ICD-10-CM

## 2024-01-30 DIAGNOSIS — L81.4 OTHER MELANIN HYPERPIGMENTATION: ICD-10-CM

## 2024-01-30 DIAGNOSIS — L57.0 ACTINIC KERATOSIS: ICD-10-CM

## 2024-01-30 DIAGNOSIS — D18.0 HEMANGIOMA: ICD-10-CM

## 2024-01-30 DIAGNOSIS — Z71.89 OTHER SPECIFIED COUNSELING: ICD-10-CM

## 2024-01-30 PROBLEM — D22.62 MELANOCYTIC NEVI OF LEFT UPPER LIMB, INCLUDING SHOULDER: Status: ACTIVE | Noted: 2024-01-30

## 2024-01-30 PROBLEM — D18.01 HEMANGIOMA OF SKIN AND SUBCUTANEOUS TISSUE: Status: ACTIVE | Noted: 2024-01-30

## 2024-01-30 PROBLEM — D22.5 MELANOCYTIC NEVI OF TRUNK: Status: ACTIVE | Noted: 2024-01-30

## 2024-01-30 PROCEDURE — 99213 OFFICE O/P EST LOW 20 MIN: CPT | Mod: 25

## 2024-01-30 PROCEDURE — 17000 DESTRUCT PREMALG LESION: CPT

## 2024-01-30 PROCEDURE — ? LIQUID NITROGEN

## 2024-01-30 PROCEDURE — ? SUNSCREEN RECOMMENDATIONS

## 2024-01-30 PROCEDURE — ? OBSERVATION

## 2024-01-30 PROCEDURE — ? SUNSCREEN TREATMENT REGIMEN

## 2024-01-30 PROCEDURE — ? EXTRACTIONS

## 2024-01-30 PROCEDURE — 17003 DESTRUCT PREMALG LES 2-14: CPT

## 2024-01-30 PROCEDURE — ? COUNSELING

## 2024-01-30 ASSESSMENT — LOCATION SIMPLE DESCRIPTION DERM
LOCATION SIMPLE: RIGHT HAND
LOCATION SIMPLE: CHEST
LOCATION SIMPLE: LEFT HAND
LOCATION SIMPLE: LEFT FOREARM
LOCATION SIMPLE: LEFT ELBOW
LOCATION SIMPLE: RIGHT FOREARM
LOCATION SIMPLE: LEFT FOREHEAD
LOCATION SIMPLE: LEFT EYELID
LOCATION SIMPLE: ABDOMEN

## 2024-01-30 ASSESSMENT — LOCATION DETAILED DESCRIPTION DERM
LOCATION DETAILED: LEFT RADIAL DORSAL HAND
LOCATION DETAILED: LEFT MEDIAL FOREHEAD
LOCATION DETAILED: LEFT PROXIMAL DORSAL FOREARM
LOCATION DETAILED: SUBXIPHOID
LOCATION DETAILED: RIGHT VENTRAL DISTAL FOREARM
LOCATION DETAILED: LEFT MEDIAL CANTHUS
LOCATION DETAILED: MIDDLE STERNUM
LOCATION DETAILED: RIGHT VENTRAL PROXIMAL FOREARM
LOCATION DETAILED: EPIGASTRIC SKIN
LOCATION DETAILED: LEFT ELBOW
LOCATION DETAILED: RIGHT RADIAL DORSAL HAND
LOCATION DETAILED: LEFT FOREHEAD
LOCATION DETAILED: UPPER STERNUM

## 2024-01-30 ASSESSMENT — LOCATION ZONE DERM
LOCATION ZONE: ARM
LOCATION ZONE: HAND
LOCATION ZONE: EYELID
LOCATION ZONE: TRUNK
LOCATION ZONE: FACE

## 2024-01-30 NOTE — PROCEDURE: LIQUID NITROGEN
Render Post-Care Instructions In Note?: no
Show Applicator Variable?: Yes
Consent: The patient's consent was obtained including but not limited to risks of crusting, scabbing, blistering, scarring, darker or lighter pigmentary change, recurrence, incomplete removal and infection.
Duration Of Freeze Thaw-Cycle (Seconds): 10
Number Of Freeze-Thaw Cycles: 2 freeze-thaw cycles
Detail Level: Detailed
Post-Care Instructions: I reviewed with the patient in detail post-care instructions. Patient is to wear sunprotection, and avoid picking at any of the treated lesions. Pt may apply Vaseline to crusted or scabbing areas.

## 2024-01-30 NOTE — PROCEDURE: EXTRACTIONS
Consent was obtained and risks were reviewed including but not limited to scarring, infection, bleeding, scabbing, incomplete removal, and allergy to anesthesia.
Render Number Of Lesions Treated: no
Acne Type: A milial cyst
Detail Level: Detailed
Prep Text (Optional): Prior to removal the treatment areas were prepped in the usual fashion.
Extraction Method: extractor
Post-Care Instructions: I reviewed with the patient in detail post-care instructions. Patient is to keep the treatment areas dry overnight, and then apply bacitracin twice daily until healed. Patient may apply hydrogen peroxide soaks to remove any crusting.

## 2024-02-13 ENCOUNTER — OUTPATIENT INFUSION SERVICES (OUTPATIENT)
Dept: ONCOLOGY | Facility: MEDICAL CENTER | Age: 79
End: 2024-02-13
Attending: INTERNAL MEDICINE
Payer: MEDICARE

## 2024-02-13 ENCOUNTER — TELEPHONE (OUTPATIENT)
Dept: ONCOLOGY | Facility: MEDICAL CENTER | Age: 79
End: 2024-02-13

## 2024-02-13 VITALS
TEMPERATURE: 98.2 F | DIASTOLIC BLOOD PRESSURE: 69 MMHG | WEIGHT: 144.18 LBS | HEIGHT: 64 IN | SYSTOLIC BLOOD PRESSURE: 132 MMHG | OXYGEN SATURATION: 96 % | HEART RATE: 66 BPM | BODY MASS INDEX: 24.62 KG/M2 | RESPIRATION RATE: 18 BRPM

## 2024-02-13 DIAGNOSIS — M81.0 OSTEOPOROSIS, POST-MENOPAUSAL: ICD-10-CM

## 2024-02-13 LAB
CA-I BLD ISE-SCNC: 1.31 MMOL/L (ref 1.1–1.3)
CREAT BLD-MCNC: 1.2 MG/DL (ref 0.5–1.4)

## 2024-02-13 PROCEDURE — 700111 HCHG RX REV CODE 636 W/ 250 OVERRIDE (IP): Mod: JZ,JG | Performed by: INTERNAL MEDICINE

## 2024-02-13 PROCEDURE — 82565 ASSAY OF CREATININE: CPT

## 2024-02-13 PROCEDURE — 82330 ASSAY OF CALCIUM: CPT

## 2024-02-13 PROCEDURE — 36415 COLL VENOUS BLD VENIPUNCTURE: CPT

## 2024-02-13 PROCEDURE — 96372 THER/PROPH/DIAG INJ SC/IM: CPT

## 2024-02-13 RX ORDER — DIPHENHYDRAMINE HYDROCHLORIDE 50 MG/ML
50 INJECTION INTRAMUSCULAR; INTRAVENOUS PRN
OUTPATIENT
Start: 2024-08-04

## 2024-02-13 RX ORDER — EPINEPHRINE 1 MG/ML(1)
0.5 AMPUL (ML) INJECTION PRN
OUTPATIENT
Start: 2024-08-04

## 2024-02-13 RX ORDER — METHYLPREDNISOLONE SODIUM SUCCINATE 125 MG/2ML
125 INJECTION, POWDER, LYOPHILIZED, FOR SOLUTION INTRAMUSCULAR; INTRAVENOUS PRN
OUTPATIENT
Start: 2024-08-04

## 2024-02-13 RX ADMIN — DENOSUMAB 60 MG: 60 INJECTION SUBCUTANEOUS at 17:35

## 2024-02-13 ASSESSMENT — FIBROSIS 4 INDEX: FIB4 SCORE: 1.31

## 2024-02-14 NOTE — TELEPHONE ENCOUNTER
Called and spoke with Sara and gave her the upcoming prolia appt time for August 20th. Pt stated that she would look in her my chart for this information as well when she got home and if this wouldn't work with her scheduled that she would call to change the date.

## 2024-02-14 NOTE — PROGRESS NOTES
Pt presented to [facility] for Prolia injection. POC discussed and pt verbalized understanding. Pt confirms taking VitD/calcium supplements, denies recent or planned dental/oral procedures in the last month or the next month, and denies s/s of hypocalcemia or infection today. Labs drawn from L[facility] without difficulty; site covered with sterile gauze/coban and pt tolerated well. Labs reviewed by pharmacy and Prolia injection administered per MAR. Band-aid applied to site and pt tolerated well. No s/s of adverse reactions or complications noted. Educated pt on side effects to watch out for and pt verbalized understanding.  emailed and pt confirms MyChart access. Pt discharged to self care in [facility].

## 2024-03-05 ENCOUNTER — OFFICE VISIT (OUTPATIENT)
Dept: RHEUMATOLOGY | Facility: MEDICAL CENTER | Age: 79
End: 2024-03-05
Attending: INTERNAL MEDICINE
Payer: MEDICARE

## 2024-03-05 ENCOUNTER — HOSPITAL ENCOUNTER (OUTPATIENT)
Dept: RADIOLOGY | Facility: MEDICAL CENTER | Age: 79
End: 2024-03-05
Attending: INTERNAL MEDICINE
Payer: MEDICARE

## 2024-03-05 VITALS
WEIGHT: 143 LBS | SYSTOLIC BLOOD PRESSURE: 130 MMHG | BODY MASS INDEX: 24.55 KG/M2 | TEMPERATURE: 98 F | RESPIRATION RATE: 14 BRPM | HEART RATE: 72 BPM | OXYGEN SATURATION: 97 % | DIASTOLIC BLOOD PRESSURE: 62 MMHG

## 2024-03-05 DIAGNOSIS — Z79.899 ENCOUNTER FOR MONITORING DENOSUMAB THERAPY: ICD-10-CM

## 2024-03-05 DIAGNOSIS — M15.9 PRIMARY OSTEOARTHRITIS INVOLVING MULTIPLE JOINTS: ICD-10-CM

## 2024-03-05 DIAGNOSIS — M25.551 BILATERAL HIP PAIN: ICD-10-CM

## 2024-03-05 DIAGNOSIS — Z51.81 ENCOUNTER FOR MONITORING DENOSUMAB THERAPY: ICD-10-CM

## 2024-03-05 DIAGNOSIS — E03.9 HYPOTHYROIDISM, UNSPECIFIED TYPE: ICD-10-CM

## 2024-03-05 DIAGNOSIS — M89.8X5 PAIN IN FEMUR: ICD-10-CM

## 2024-03-05 DIAGNOSIS — M25.552 BILATERAL HIP PAIN: ICD-10-CM

## 2024-03-05 DIAGNOSIS — M81.0 OSTEOPOROSIS, POST-MENOPAUSAL: ICD-10-CM

## 2024-03-05 PROCEDURE — 73552 X-RAY EXAM OF FEMUR 2/>: CPT | Mod: LT

## 2024-03-05 PROCEDURE — 3078F DIAST BP <80 MM HG: CPT | Performed by: INTERNAL MEDICINE

## 2024-03-05 PROCEDURE — 99212 OFFICE O/P EST SF 10 MIN: CPT

## 2024-03-05 PROCEDURE — 3075F SYST BP GE 130 - 139MM HG: CPT | Performed by: INTERNAL MEDICINE

## 2024-03-05 PROCEDURE — 99213 OFFICE O/P EST LOW 20 MIN: CPT | Performed by: INTERNAL MEDICINE

## 2024-03-05 ASSESSMENT — PATIENT HEALTH QUESTIONNAIRE - PHQ9: CLINICAL INTERPRETATION OF PHQ2 SCORE: 0

## 2024-03-05 ASSESSMENT — FIBROSIS 4 INDEX: FIB4 SCORE: 1.31

## 2024-03-05 NOTE — PROGRESS NOTES
Chief Complaint- joint pain     Subjective:   Sara Sandoval is a 78 y.o. female here today for follow up of rheumatological issues    This is a follow-up visit for this patient who we see in this clinic for osteoporosis.  Patient is currently on Prolia 60 mg subcu every 6 months.  Patient comes in today stating that she wonders if Prolia may be causing some problems because as patient walks she gets a catch in her hips and rating pain down to the anterior thigh as well as in her groin area.  It occurs randomly, and occurs bilaterally although not at the same time necessarily.  Patient denies any fractures or falls since last visit.         Bilateral CHLOE by Dr. Lyons orthopedics 2016     S/p fosamax [phone]  S/p actonel until 2010     DEXA 1/2014 T scores -1.5, -3.0  DEXA 1/2016 T scores -2.0, -1.9     DEXA 3/20/2018 T scores -2.2, -2.0    DEXA 6/2/2020 T scores -2.5, -1.8  FRAX 6/2/2020 not done   DEXA 6/21/2022 T scores -2.8, -2.5  FRAX 6/21/2022 not done  On Prolia since 6/2022     Current Outpatient Medications   Medication Sig Dispense Refill    lisinopril (PRINIVIL) 20 MG Tab Take 1 Tablet by mouth every day. 90 Tablet 3    METAMUCIL FIBER PO Take  by mouth.      Cholecalciferol (D3 2000) 2000 UNIT Cap Take 2,000 Units by mouth every day.      Non Formulary Request Take 1 Tablet by mouth every day. Calcium Citrate, Magnesium, and Zinc      Multiple Vitamins-Minerals (MULTI FOR HER 50+ PO) Take 1 Tablet by mouth every day.      Lutein-Zeaxanthin 25-5 MG Cap Take 1 Tablet by mouth every day.      denosumab (PROLIA) 60 MG/ML Solution Prefilled Syringe injection Inject 60 mg under the skin every 6 months. Last dose was 2/9/2023      TURMERIC PO Take 1,200 mg by mouth.      Coenzyme Q10 200 MG Cap Take 200 mg by mouth every day.      Omega-3 Fatty Acids (FISH OIL) 1000 MG Cap capsule Take 4,000 mg by mouth every day.      levothyroxine (SYNTHROID) 75 MCG Tab Take 75 mcg by mouth every day.       No  current facility-administered medications for this visit.     She  has a past medical history of Arthritis, Osteoporosis, Pain (06/11/2012), Shingles (01/01/2010), and Unspecified disorder of thyroid.    ROS   Other than what is mentioned in HPI or physical exam, there is no history of headaches, double vision or blurred vision.  No trouble swallowing difficulties .  No chest complaints including chest pain, cough or sputum production. No GI complaints including nausea, vomiting, change in bowel habits, or past peptic ulcer disease. No history of blood in the stools. No urinary complaints including dysuria or frequency. No history of alopecia, photosensitivity     Objective:     /62   Pulse 72   Temp 36.7 °C (98 °F) (Temporal)   Resp 14   Wt 64.9 kg (143 lb)   SpO2 97%  Body mass index is 24.55 kg/m².   Physical Exam:    Constitutional: Alert and oriented X3, patient is talkative with good eye contact.Skin: Warm, dry, good turgor, no rashes in visible areas.Eye: Equal, round and reactive, conjunctiva clear, lids normal EOM intactENMT: Lips without lesions,  pinna without deformityNeck: Trachea midline, no masses, no thyromegaly.Lymph:  No cervical lymphadenopathy, no axillary lymphadenopathy, no supraclavicular lymphadenopathyRespiratory: Unlabored respiratory effort, lungs clear to auscultation, no wheezes, no ronchi.Cardiovascular: Normal S1, S2, Regular rate and rhythm, no murmurs rubs or gallops  .Abdomen: Soft, non-distended.Psych: Alert and oriented x3, normal affect and mood.Neuro: Cranial nerves 2-12 are grossly intact Ext:no joint laxity noted in bilateral arms, no joint laxity noted in bilateral legs, no joint effusions    Lab Results   Component Value Date/Time    HEPCAB Non-Reactive 03/20/2023 09:24 AM     Lab Results   Component Value Date/Time    SODIUM 142 12/13/2023 08:38 AM    POTASSIUM 4.9 12/13/2023 08:38 AM    CHLORIDE 108 12/13/2023 08:38 AM    CO2 25 12/13/2023 08:38 AM    GLUCOSE 92  12/13/2023 08:38 AM    BUN 19 12/13/2023 08:38 AM    CREATININE 0.88 12/13/2023 08:38 AM      Lab Results   Component Value Date/Time    WBC 6.2 12/13/2023 08:38 AM    RBC 3.82 (L) 12/13/2023 08:38 AM    HEMOGLOBIN 11.9 (L) 12/13/2023 08:38 AM    HEMATOCRIT 36.9 (L) 12/13/2023 08:38 AM    MCV 96.6 12/13/2023 08:38 AM    MCH 31.2 12/13/2023 08:38 AM    MCHC 32.2 12/13/2023 08:38 AM    MPV 9.4 12/13/2023 08:38 AM    NEUTSPOLYS 56.40 12/13/2023 08:38 AM    LYMPHOCYTES 33.10 12/13/2023 08:38 AM    MONOCYTES 6.80 12/13/2023 08:38 AM    EOSINOPHILS 2.70 12/13/2023 08:38 AM    BASOPHILS 0.80 12/13/2023 08:38 AM      Lab Results   Component Value Date/Time    CALCIUM 8.7 12/13/2023 08:38 AM    ASTSGOT 18 12/13/2023 08:38 AM    ALTSGPT 15 12/13/2023 08:38 AM    ALKPHOSPHAT 51 12/13/2023 08:38 AM    TBILIRUBIN 0.2 12/13/2023 08:38 AM    ALBUMIN 4.0 12/13/2023 08:38 AM    TOTPROTEIN 6.9 12/13/2023 08:38 AM     Lab Results   Component Value Date/Time    SEDRATEWES 16 09/07/2018 02:39 PM     Lab Results   Component Value Date/Time    HBA1C 5.8 (H) 12/13/2023 08:38 AM     Lab Results   Component Value Date/Time    PTHINTACT 51.4 07/16/2020 11:12 AM     Assessment and Plan:     1. Osteoporosis, post-menopausal  Last DEXA June 2022 next DEXA June 2024, DEXA ordered for patient currently on Prolia 60 mg subcu every 6 months of note vitamin D level normal   continue calcium citrate 1200 mg by mouth daily and vitamin D about 2000 units by mouth daily and magnesium 200 mg by mouth daily  - DX-FEMUR-2+ LEFT; Future  - DX-FEMUR-2+ RIGHT; Future  - DS-BONE DENSITY STUDY (DEXA); Future  - Referral to Orthopedics    2. Bilateral hip pain  Reviewed x-rays from July 2023, no deformities, THAs without problem, will refer back to Dr. Lyons in orthopedics for reevaluation as for cause of patient's catching in the hips  - Referral to Orthopedics    3. Pain in femur  Possible referral pain from hips, however today we will do bilateral femur  x-rays to assure no stress fractures, patient denies any falls  - DX-FEMUR-2+ LEFT; Future  - DX-FEMUR-2+ RIGHT; Future  - Referral to Orthopedics    4. Encounter for monitoring denosumab therapy  Patient currently on Prolia 60 mg subcu every 6 months, patient gets a calcium level within 2 weeks of each Prolia shot  - DX-FEMUR-2+ LEFT; Future  - DX-FEMUR-2+ RIGHT; Future  - DS-BONE DENSITY STUDY (DEXA); Future  - Referral to Orthopedics    5. Primary osteoarthritis involving multiple joints  Patient uses Tylenol or over-the-counter NSAIDs as needed  - DX-FEMUR-2+ LEFT; Future  - DX-FEMUR-2+ RIGHT; Future  - DS-BONE DENSITY STUDY (DEXA); Future    6. Hypothyroidism, unspecified type  Followed by patients PCP, can exacerbate joint pain, I recommend monitoring thyroid on a regular basis to assure it is not contributing to the patient's joint pain    Followup: Return in about 3 months (around 6/5/2024). or sooner prn      Please note that this dictation was created using voice recognition software. I have made every reasonable attempt to correct obvious errors, but I expect that there are errors of grammar and possibly content that I did not discover before finalizing the note.

## 2024-03-07 ENCOUNTER — TELEPHONE (OUTPATIENT)
Dept: RHEUMATOLOGY | Facility: MEDICAL CENTER | Age: 79
End: 2024-03-07

## 2024-04-02 ENCOUNTER — HOSPITAL ENCOUNTER (OUTPATIENT)
Dept: RADIOLOGY | Facility: MEDICAL CENTER | Age: 79
End: 2024-04-02
Payer: MEDICARE

## 2024-04-02 DIAGNOSIS — Z12.31 VISIT FOR SCREENING MAMMOGRAM: ICD-10-CM

## 2024-04-02 PROCEDURE — 77067 SCR MAMMO BI INCL CAD: CPT

## 2024-05-08 ENCOUNTER — OFFICE VISIT (OUTPATIENT)
Dept: MEDICAL GROUP | Facility: PHYSICIAN GROUP | Age: 79
End: 2024-05-08
Payer: MEDICARE

## 2024-05-08 VITALS
SYSTOLIC BLOOD PRESSURE: 120 MMHG | HEART RATE: 74 BPM | DIASTOLIC BLOOD PRESSURE: 70 MMHG | BODY MASS INDEX: 25.1 KG/M2 | OXYGEN SATURATION: 94 % | HEIGHT: 64 IN | WEIGHT: 147 LBS | TEMPERATURE: 98.3 F

## 2024-05-08 DIAGNOSIS — E78.00 ELEVATED LDL CHOLESTEROL LEVEL: ICD-10-CM

## 2024-05-08 DIAGNOSIS — D64.9 ANEMIA, UNSPECIFIED TYPE: ICD-10-CM

## 2024-05-08 DIAGNOSIS — E03.9 HYPOTHYROIDISM, UNSPECIFIED TYPE: ICD-10-CM

## 2024-05-08 DIAGNOSIS — I10 PRIMARY HYPERTENSION: ICD-10-CM

## 2024-05-08 DIAGNOSIS — R73.03 PREDIABETES: ICD-10-CM

## 2024-05-08 DIAGNOSIS — E55.9 VITAMIN D DEFICIENCY: ICD-10-CM

## 2024-05-08 PROCEDURE — 99214 OFFICE O/P EST MOD 30 MIN: CPT

## 2024-05-08 PROCEDURE — 3074F SYST BP LT 130 MM HG: CPT

## 2024-05-08 PROCEDURE — 3078F DIAST BP <80 MM HG: CPT

## 2024-05-08 RX ORDER — LISINOPRIL 20 MG/1
20 TABLET ORAL DAILY
Qty: 90 TABLET | Refills: 3 | Status: SHIPPED | OUTPATIENT
Start: 2024-05-08

## 2024-05-08 RX ORDER — LEVOTHYROXINE SODIUM 0.07 MG/1
75 TABLET ORAL DAILY
Qty: 90 TABLET | Refills: 3 | Status: SHIPPED | OUTPATIENT
Start: 2024-05-08

## 2024-05-08 ASSESSMENT — FIBROSIS 4 INDEX: FIB4 SCORE: 1.31

## 2024-05-08 ASSESSMENT — ENCOUNTER SYMPTOMS
HEADACHES: 0
MYALGIAS: 0
DIZZINESS: 0
SHORTNESS OF BREATH: 0
BLURRED VISION: 0
CONSTIPATION: 0
FEVER: 0
CHILLS: 0
ABDOMINAL PAIN: 0
COUGH: 0
NAUSEA: 0
VOMITING: 0
WEIGHT LOSS: 0
DIARRHEA: 0
WEAKNESS: 0

## 2024-05-08 NOTE — PROGRESS NOTES
Verbal consent was acquired by the patient to use [employer] ambient listening note generation during this visit  Subjective:     CC: Follow-up    HPI:   Belkys Lennon presents today with  History of Present Illness  The patient is an 78-year-old female who presents for evaluation of multiple medical concerns.    The patient experienced a transient episode of dizziness last week, which has since resolved. She has a history of anemia, however, she is uncertain of the exact cause of her anemia. She reports experiencing fatigue over the past few weeks. Despite her age, she maintains an active lifestyle, engaging in daily walks. Her diet is predominantly composed of chicken, excluding meat. She has experienced weight gain, which she attributes to a healthy eating pattern during a vacation in [place].    The patient has requested a cardiac calcium score test.    Supplemental Information  She has recently moved into a 2-bedroom and 2-bedroom house, which is closing. She is seeing a therapist for her grief, which she finds beneficial. She has good support from her son and friends. She never took Xanax. She passed her panic attacks and heart issues. Her son told her to get a hearing test.   Her father had 4 to 5 strokes and a heart attack. Her grandmother has hearing issues.      Problem   Primary Hypertension      Taking Lisinopril 20 mg BID, -BP today is 120/70       Hypothyroid         ROS:  Review of Systems   Constitutional:  Negative for chills, fever, malaise/fatigue and weight loss.   Eyes:  Negative for blurred vision.   Respiratory:  Negative for cough and shortness of breath.    Cardiovascular:  Negative for chest pain.   Gastrointestinal:  Negative for abdominal pain, constipation, diarrhea, nausea and vomiting.   Musculoskeletal:  Negative for myalgias.   Neurological:  Negative for dizziness, weakness and headaches.       Objective:     Exam:  /70 (BP Location: Left arm, Patient Position: Sitting, BP Cuff  "Size: Adult)   Pulse 74   Temp 36.8 °C (98.3 °F) (Temporal)   Ht 1.626 m (5' 4\")   Wt 66.7 kg (147 lb)   SpO2 94%   BMI 25.23 kg/m²  Body mass index is 25.23 kg/m².    Physical Exam  Constitutional:       General: She is not in acute distress.     Appearance: Normal appearance. She is not ill-appearing or toxic-appearing.   HENT:      Head: Normocephalic.   Eyes:      Conjunctiva/sclera: Conjunctivae normal.   Cardiovascular:      Rate and Rhythm: Normal rate and regular rhythm.      Pulses: Normal pulses.      Heart sounds: Normal heart sounds. No murmur heard.  Pulmonary:      Effort: Pulmonary effort is normal. No respiratory distress.      Breath sounds: Normal breath sounds.   Skin:     General: Skin is warm and dry.   Neurological:      General: No focal deficit present.      Mental Status: She is alert and oriented to person, place, and time.   Psychiatric:         Mood and Affect: Mood normal.         Behavior: Behavior normal.         Labs:   No visits with results within 1 Month(s) from this visit.   Latest known visit with results is:   [facility] on 02/13/2024   Component Date Value    Istat Creatinine 02/13/2024 1.2     Istat Ionized Calcium 02/13/2024 1.31 (H)      Lab Results   Component Value Date/Time    CHOLSTRLTOT 189 12/13/2023 08:38 AM     (H) 12/13/2023 08:38 AM    HDL 70 12/13/2023 08:38 AM    TRIGLYCERIDE 74 12/13/2023 08:38 AM       Lab Results   Component Value Date/Time    SODIUM 142 12/13/2023 08:38 AM    POTASSIUM 4.9 12/13/2023 08:38 AM    CHLORIDE 108 12/13/2023 08:38 AM    CO2 25 12/13/2023 08:38 AM    GLUCOSE 92 12/13/2023 08:38 AM    BUN 19 12/13/2023 08:38 AM    CREATININE 0.88 12/13/2023 08:38 AM     Lab Results   Component Value Date/Time    ALKPHOSPHAT 51 12/13/2023 08:38 AM    ASTSGOT 18 12/13/2023 08:38 AM    ALTSGPT 15 12/13/2023 08:38 AM    TBILIRUBIN 0.2 12/13/2023 08:38 AM      Lab Results   Component Value Date/Time    HBA1C 5.8 (H) " "12/13/2023 08:38 AM    HBA1C 5.7 (H) 03/20/2023 09:24 AM    HBA1C 5.7 (H) 07/16/2020 11:12 AM     No results found for: \"TSH\"  Lab Results   Component Value Date/Time    FREET4 1.45 03/20/2023 09:24 AM    FREET4 1.26 08/30/2022 12:57 PM     No results found for: \"CBC\"      Assessment & Plan: Medical Decision Making     78 y.o. female with the following -   Assessment & Plan  1. Health maintenance.  A metabolic panel, lipid panel, A1c, and CBC with differential will be conducted. A cardiac CT score test will be ordered. A prescription for lisinopril and levothyroxine will be provided.    2. Anemia.  The patient was informed that anemia can manifest as lightheadedness and fatigue. Vitamin B12 and iron levels will be checked. If anemia persists, an iron supplement will be initiated.    3. Prediabetes.  The patient's A1c level is slightly elevated at 5.8, indicating a prediabetic range.    Problem List Items Addressed This Visit          Family Medicine Problems    Vitamin D deficiency disease    Relevant Orders    VITAMIN D,25 HYDROXY (DEFICIENCY)    Primary hypertension     Chronic condition stable therefore we will continue current regimen of lisinopril 20 mg daily, refill sent.         Relevant Medications    lisinopril (PRINIVIL) 20 MG Tab       Other    Hypothyroid     Chronic condition she does complain of current fatigue however last labs shows biochemical stability and she is taking 75 mcg of levothyroxine daily.  Will recheck TSH and T4 at next lab draw.  Medication refill sent.         Relevant Medications    levothyroxine (SYNTHROID) 75 MCG Tab    Other Relevant Orders    TSH+FREE T4     Other Visit Diagnoses       Anemia, unspecified type        Relevant Orders    Comp Metabolic Panel    Lipid Profile    HEMOGLOBIN A1C    CBC WITH DIFFERENTIAL    VITAMIN B12    IRON/TOTAL IRON BIND    Prediabetes        Relevant Orders    Comp Metabolic Panel    HEMOGLOBIN A1C    Elevated LDL cholesterol level        " Relevant Orders    Comp Metabolic Panel    Lipid Profile    CT-CARDIAC SCORING            Differential diagnosis, natural history, supportive care, and indications for immediate follow-up discussed.  Shared decision making approach utilized, and patient is amendable with plan of care.  Patient understands to return to clinic or go to the emergency department if symptoms worsen. All questions and concerns addressed to the best of my knowledge.    Return in about 6 months (around 11/8/2024).    Please note that this dictation was created using voice recognition software. I have made every reasonable attempt to correct obvious errors, but I expect that there are errors of grammar and possibly content that I did not discover before finalizing the note.

## 2024-05-09 NOTE — ASSESSMENT & PLAN NOTE
Chronic condition stable therefore we will continue current regimen of lisinopril 20 mg daily, refill sent.

## 2024-05-09 NOTE — ASSESSMENT & PLAN NOTE
Chronic condition she does complain of current fatigue however last labs shows biochemical stability and she is taking 75 mcg of levothyroxine daily.  Will recheck TSH and T4 at next lab draw.  Medication refill sent.

## 2024-05-13 ENCOUNTER — HOSPITAL ENCOUNTER (OUTPATIENT)
Dept: RADIOLOGY | Facility: MEDICAL CENTER | Age: 79
End: 2024-05-13
Payer: COMMERCIAL

## 2024-05-13 DIAGNOSIS — E78.00 ELEVATED LDL CHOLESTEROL LEVEL: ICD-10-CM

## 2024-05-20 ENCOUNTER — HOSPITAL ENCOUNTER (OUTPATIENT)
Dept: LAB | Facility: MEDICAL CENTER | Age: 79
End: 2024-05-20
Payer: MEDICARE

## 2024-05-20 DIAGNOSIS — D64.9 ANEMIA, UNSPECIFIED TYPE: ICD-10-CM

## 2024-05-20 DIAGNOSIS — R73.03 PREDIABETES: ICD-10-CM

## 2024-05-20 DIAGNOSIS — E78.00 ELEVATED LDL CHOLESTEROL LEVEL: ICD-10-CM

## 2024-05-20 DIAGNOSIS — E55.9 VITAMIN D DEFICIENCY: ICD-10-CM

## 2024-05-20 LAB
25(OH)D3 SERPL-MCNC: 70 NG/ML (ref 30–100)
ALBUMIN SERPL BCP-MCNC: 3.9 G/DL (ref 3.2–4.9)
ALBUMIN/GLOB SERPL: 1.3 G/DL
ALP SERPL-CCNC: 47 U/L (ref 30–99)
ALT SERPL-CCNC: 12 U/L (ref 2–50)
ANION GAP SERPL CALC-SCNC: 11 MMOL/L (ref 7–16)
AST SERPL-CCNC: 17 U/L (ref 12–45)
BASOPHILS # BLD AUTO: 0.5 % (ref 0–1.8)
BASOPHILS # BLD: 0.04 K/UL (ref 0–0.12)
BILIRUB SERPL-MCNC: 0.2 MG/DL (ref 0.1–1.5)
BUN SERPL-MCNC: 23 MG/DL (ref 8–22)
CALCIUM ALBUM COR SERPL-MCNC: 8.8 MG/DL (ref 8.5–10.5)
CALCIUM SERPL-MCNC: 8.7 MG/DL (ref 8.5–10.5)
CHLORIDE SERPL-SCNC: 108 MMOL/L (ref 96–112)
CHOLEST SERPL-MCNC: 162 MG/DL (ref 100–199)
CO2 SERPL-SCNC: 22 MMOL/L (ref 20–33)
CREAT SERPL-MCNC: 0.93 MG/DL (ref 0.5–1.4)
EOSINOPHIL # BLD AUTO: 0.09 K/UL (ref 0–0.51)
EOSINOPHIL NFR BLD: 1.2 % (ref 0–6.9)
ERYTHROCYTE [DISTWIDTH] IN BLOOD BY AUTOMATED COUNT: 47 FL (ref 35.9–50)
EST. AVERAGE GLUCOSE BLD GHB EST-MCNC: 111 MG/DL
FASTING STATUS PATIENT QL REPORTED: NORMAL
GFR SERPLBLD CREATININE-BSD FMLA CKD-EPI: 62 ML/MIN/1.73 M 2
GLOBULIN SER CALC-MCNC: 2.9 G/DL (ref 1.9–3.5)
GLUCOSE SERPL-MCNC: 81 MG/DL (ref 65–99)
HBA1C MFR BLD: 5.5 % (ref 4–5.6)
HCT VFR BLD AUTO: 35 % (ref 37–47)
HDLC SERPL-MCNC: 50 MG/DL
HGB BLD-MCNC: 11.7 G/DL (ref 12–16)
IMM GRANULOCYTES # BLD AUTO: 0.01 K/UL (ref 0–0.11)
IMM GRANULOCYTES NFR BLD AUTO: 0.1 % (ref 0–0.9)
IRON SATN MFR SERPL: 15 % (ref 15–55)
IRON SERPL-MCNC: 41 UG/DL (ref 40–170)
LDLC SERPL CALC-MCNC: 95 MG/DL
LYMPHOCYTES # BLD AUTO: 2.99 K/UL (ref 1–4.8)
LYMPHOCYTES NFR BLD: 41 % (ref 22–41)
MCH RBC QN AUTO: 31.5 PG (ref 27–33)
MCHC RBC AUTO-ENTMCNC: 33.4 G/DL (ref 32.2–35.5)
MCV RBC AUTO: 94.1 FL (ref 81.4–97.8)
MONOCYTES # BLD AUTO: 0.52 K/UL (ref 0–0.85)
MONOCYTES NFR BLD AUTO: 7.1 % (ref 0–13.4)
NEUTROPHILS # BLD AUTO: 3.64 K/UL (ref 1.82–7.42)
NEUTROPHILS NFR BLD: 50.1 % (ref 44–72)
NRBC # BLD AUTO: 0 K/UL
NRBC BLD-RTO: 0 /100 WBC (ref 0–0.2)
PLATELET # BLD AUTO: 306 K/UL (ref 164–446)
PMV BLD AUTO: 9.5 FL (ref 9–12.9)
POTASSIUM SERPL-SCNC: 4.6 MMOL/L (ref 3.6–5.5)
PROT SERPL-MCNC: 6.8 G/DL (ref 6–8.2)
RBC # BLD AUTO: 3.72 M/UL (ref 4.2–5.4)
SODIUM SERPL-SCNC: 141 MMOL/L (ref 135–145)
T4 FREE SERPL-MCNC: 1.39 NG/DL (ref 0.93–1.7)
TIBC SERPL-MCNC: 282 UG/DL (ref 250–450)
TRIGL SERPL-MCNC: 84 MG/DL (ref 0–149)
TSH SERPL DL<=0.005 MIU/L-ACNC: 0.87 UIU/ML (ref 0.38–5.33)
UIBC SERPL-MCNC: 241 UG/DL (ref 110–370)
VIT B12 SERPL-MCNC: 1109 PG/ML (ref 211–911)
WBC # BLD AUTO: 7.3 K/UL (ref 4.8–10.8)

## 2024-06-05 DIAGNOSIS — D64.9 ANEMIA, UNSPECIFIED TYPE: ICD-10-CM

## 2024-06-05 RX ORDER — FERROUS SULFATE 325(65) MG
325 TABLET ORAL
Qty: 30 TABLET | Refills: 1 | Status: SHIPPED | OUTPATIENT
Start: 2024-06-05

## 2024-06-24 ENCOUNTER — HOSPITAL ENCOUNTER (OUTPATIENT)
Dept: RADIOLOGY | Facility: MEDICAL CENTER | Age: 79
End: 2024-06-24
Attending: INTERNAL MEDICINE
Payer: MEDICARE

## 2024-06-24 DIAGNOSIS — Z79.899 ENCOUNTER FOR MONITORING DENOSUMAB THERAPY: ICD-10-CM

## 2024-06-24 DIAGNOSIS — M15.9 PRIMARY OSTEOARTHRITIS INVOLVING MULTIPLE JOINTS: ICD-10-CM

## 2024-06-24 DIAGNOSIS — M81.0 OSTEOPOROSIS, POST-MENOPAUSAL: ICD-10-CM

## 2024-06-24 DIAGNOSIS — Z51.81 ENCOUNTER FOR MONITORING DENOSUMAB THERAPY: ICD-10-CM

## 2024-06-24 PROCEDURE — 77080 DXA BONE DENSITY AXIAL: CPT

## 2024-07-01 ENCOUNTER — APPOINTMENT (OUTPATIENT)
Dept: RHEUMATOLOGY | Facility: MEDICAL CENTER | Age: 79
End: 2024-07-01
Attending: INTERNAL MEDICINE
Payer: MEDICARE

## 2024-07-01 VITALS
RESPIRATION RATE: 12 BRPM | OXYGEN SATURATION: 96 % | HEART RATE: 73 BPM | DIASTOLIC BLOOD PRESSURE: 58 MMHG | TEMPERATURE: 97.2 F | BODY MASS INDEX: 24.03 KG/M2 | SYSTOLIC BLOOD PRESSURE: 118 MMHG | WEIGHT: 140 LBS

## 2024-07-01 DIAGNOSIS — Z51.81 ENCOUNTER FOR MONITORING DENOSUMAB THERAPY: ICD-10-CM

## 2024-07-01 DIAGNOSIS — M81.0 OSTEOPOROSIS, POST-MENOPAUSAL: ICD-10-CM

## 2024-07-01 DIAGNOSIS — M15.9 PRIMARY OSTEOARTHRITIS INVOLVING MULTIPLE JOINTS: ICD-10-CM

## 2024-07-01 DIAGNOSIS — E03.9 HYPOTHYROIDISM, UNSPECIFIED TYPE: ICD-10-CM

## 2024-07-01 DIAGNOSIS — Z79.899 ENCOUNTER FOR MONITORING DENOSUMAB THERAPY: ICD-10-CM

## 2024-07-01 PROCEDURE — 99212 OFFICE O/P EST SF 10 MIN: CPT | Performed by: INTERNAL MEDICINE

## 2024-07-01 PROCEDURE — 3074F SYST BP LT 130 MM HG: CPT | Performed by: INTERNAL MEDICINE

## 2024-07-01 PROCEDURE — 99213 OFFICE O/P EST LOW 20 MIN: CPT | Performed by: INTERNAL MEDICINE

## 2024-07-01 PROCEDURE — 3078F DIAST BP <80 MM HG: CPT | Performed by: INTERNAL MEDICINE

## 2024-07-01 ASSESSMENT — FIBROSIS 4 INDEX: FIB4 SCORE: 1.28

## 2024-08-08 ENCOUNTER — APPOINTMENT (RX ONLY)
Dept: URBAN - METROPOLITAN AREA CLINIC 6 | Facility: CLINIC | Age: 79
Setting detail: DERMATOLOGY
End: 2024-08-08

## 2024-08-08 DIAGNOSIS — L82.0 INFLAMED SEBORRHEIC KERATOSIS: ICD-10-CM

## 2024-08-08 DIAGNOSIS — L57.0 ACTINIC KERATOSIS: ICD-10-CM | Status: INADEQUATELY CONTROLLED

## 2024-08-08 DIAGNOSIS — L82.1 OTHER SEBORRHEIC KERATOSIS: ICD-10-CM

## 2024-08-08 DIAGNOSIS — D22 MELANOCYTIC NEVI: ICD-10-CM

## 2024-08-08 DIAGNOSIS — Z71.89 OTHER SPECIFIED COUNSELING: ICD-10-CM

## 2024-08-08 DIAGNOSIS — Z85.828 PERSONAL HISTORY OF OTHER MALIGNANT NEOPLASM OF SKIN: ICD-10-CM

## 2024-08-08 DIAGNOSIS — L72.0 EPIDERMAL CYST: ICD-10-CM

## 2024-08-08 DIAGNOSIS — L81.4 OTHER MELANIN HYPERPIGMENTATION: ICD-10-CM

## 2024-08-08 DIAGNOSIS — D18.0 HEMANGIOMA: ICD-10-CM

## 2024-08-08 PROBLEM — D18.01 HEMANGIOMA OF SKIN AND SUBCUTANEOUS TISSUE: Status: ACTIVE | Noted: 2024-08-08

## 2024-08-08 PROBLEM — D22.5 MELANOCYTIC NEVI OF TRUNK: Status: ACTIVE | Noted: 2024-08-08

## 2024-08-08 PROBLEM — D22.62 MELANOCYTIC NEVI OF LEFT UPPER LIMB, INCLUDING SHOULDER: Status: ACTIVE | Noted: 2024-08-08

## 2024-08-08 PROCEDURE — ? SUNSCREEN TREATMENT REGIMEN

## 2024-08-08 PROCEDURE — ? EXTRACTIONS

## 2024-08-08 PROCEDURE — 17003 DESTRUCT PREMALG LES 2-14: CPT | Mod: 59

## 2024-08-08 PROCEDURE — 99213 OFFICE O/P EST LOW 20 MIN: CPT | Mod: 25

## 2024-08-08 PROCEDURE — ? COUNSELING

## 2024-08-08 PROCEDURE — ? EDUCATIONAL RESOURCES PROVIDED

## 2024-08-08 PROCEDURE — 17000 DESTRUCT PREMALG LESION: CPT | Mod: 59

## 2024-08-08 PROCEDURE — ? SUNSCREEN RECOMMENDATIONS

## 2024-08-08 PROCEDURE — ? LIQUID NITROGEN

## 2024-08-08 PROCEDURE — 17110 DESTRUCTION B9 LES UP TO 14: CPT

## 2024-08-08 ASSESSMENT — LOCATION SIMPLE DESCRIPTION DERM
LOCATION SIMPLE: ABDOMEN
LOCATION SIMPLE: LEFT HAND
LOCATION SIMPLE: LEFT ELBOW
LOCATION SIMPLE: LEFT FOREHEAD
LOCATION SIMPLE: NOSE
LOCATION SIMPLE: LEFT FOREARM
LOCATION SIMPLE: CHEST
LOCATION SIMPLE: LEFT EYELID
LOCATION SIMPLE: RIGHT FOREARM
LOCATION SIMPLE: RIGHT HAND

## 2024-08-08 ASSESSMENT — LOCATION DETAILED DESCRIPTION DERM
LOCATION DETAILED: LEFT ELBOW
LOCATION DETAILED: UPPER STERNUM
LOCATION DETAILED: LEFT RADIAL DORSAL HAND
LOCATION DETAILED: RIGHT PROXIMAL DORSAL FOREARM
LOCATION DETAILED: RIGHT RADIAL DORSAL HAND
LOCATION DETAILED: SUBXIPHOID
LOCATION DETAILED: RIGHT VENTRAL DISTAL FOREARM
LOCATION DETAILED: LEFT MEDIAL CANTHUS
LOCATION DETAILED: EPIGASTRIC SKIN
LOCATION DETAILED: LEFT FOREHEAD
LOCATION DETAILED: LEFT PROXIMAL DORSAL FOREARM
LOCATION DETAILED: MIDDLE STERNUM
LOCATION DETAILED: NASAL SUPRATIP
LOCATION DETAILED: LOWER STERNUM

## 2024-08-08 ASSESSMENT — LOCATION ZONE DERM
LOCATION ZONE: NOSE
LOCATION ZONE: HAND
LOCATION ZONE: FACE
LOCATION ZONE: TRUNK
LOCATION ZONE: ARM
LOCATION ZONE: EYELID

## 2024-08-08 NOTE — PROCEDURE: LIQUID NITROGEN
Add 52 Modifier (Optional): no
Spray Paint Text: The liquid nitrogen was applied to the skin utilizing a spray paint frosting technique.
Show Aperture Variable?: Yes
Consent: The patient's consent was obtained including but not limited to risks of crusting, scabbing, blistering, scarring, darker or lighter pigmentary change, recurrence, incomplete removal and infection.
Medical Necessity Information: It is in your best interest to select a reason for this procedure from the list below. All of these items fulfill various CMS LCD requirements except the new and changing color options.
Post-Care Instructions: I reviewed with the patient in detail post-care instructions. Patient is to wear sunprotection, and avoid picking at any of the treated lesions. Pt may apply Vaseline to crusted or scabbing areas.
Detail Level: Detailed
Medical Necessity Clause: This procedure was medically necessary because the lesions that were treated were:
Number Of Freeze-Thaw Cycles: 3 freeze-thaw cycles
Duration Of Freeze Thaw-Cycle (Seconds): 3

## 2024-08-12 ENCOUNTER — TELEPHONE (OUTPATIENT)
Dept: MEDICAL GROUP | Facility: PHYSICIAN GROUP | Age: 79
End: 2024-08-12
Payer: MEDICARE

## 2024-08-12 DIAGNOSIS — U07.1 COVID: ICD-10-CM

## 2024-08-12 NOTE — TELEPHONE ENCOUNTER
Patient tested + for Covid on Saturday and would like to know what to do next and would like some advise Please call [phone]

## 2024-08-27 ENCOUNTER — OUTPATIENT INFUSION SERVICES (OUTPATIENT)
Dept: ONCOLOGY | Facility: MEDICAL CENTER | Age: 79
End: 2024-08-27
Attending: INTERNAL MEDICINE
Payer: MEDICARE

## 2024-08-27 VITALS
OXYGEN SATURATION: 96 % | WEIGHT: 143.3 LBS | DIASTOLIC BLOOD PRESSURE: 71 MMHG | HEART RATE: 86 BPM | RESPIRATION RATE: 16 BRPM | BODY MASS INDEX: 24.46 KG/M2 | SYSTOLIC BLOOD PRESSURE: 134 MMHG | HEIGHT: 64 IN | TEMPERATURE: 99.1 F

## 2024-08-27 DIAGNOSIS — M81.0 OSTEOPOROSIS, POST-MENOPAUSAL: ICD-10-CM

## 2024-08-27 LAB
CA-I BLD ISE-SCNC: 1.16 MMOL/L (ref 1.1–1.3)
CREAT BLD-MCNC: 1 MG/DL (ref 0.5–1.4)

## 2024-08-27 PROCEDURE — 36415 COLL VENOUS BLD VENIPUNCTURE: CPT

## 2024-08-27 PROCEDURE — 96372 THER/PROPH/DIAG INJ SC/IM: CPT

## 2024-08-27 PROCEDURE — 82330 ASSAY OF CALCIUM: CPT

## 2024-08-27 PROCEDURE — 700111 HCHG RX REV CODE 636 W/ 250 OVERRIDE (IP): Mod: JZ,JG | Performed by: INTERNAL MEDICINE

## 2024-08-27 PROCEDURE — 82565 ASSAY OF CREATININE: CPT

## 2024-08-27 RX ORDER — DIPHENHYDRAMINE HYDROCHLORIDE 50 MG/ML
50 INJECTION INTRAMUSCULAR; INTRAVENOUS PRN
OUTPATIENT
Start: 2025-02-07

## 2024-08-27 RX ORDER — METHYLPREDNISOLONE SODIUM SUCCINATE 125 MG/2ML
125 INJECTION, POWDER, LYOPHILIZED, FOR SOLUTION INTRAMUSCULAR; INTRAVENOUS PRN
OUTPATIENT
Start: 2025-02-07

## 2024-08-27 RX ORDER — EPINEPHRINE 1 MG/ML(1)
0.5 AMPUL (ML) INJECTION PRN
OUTPATIENT
Start: 2025-02-07

## 2024-08-27 RX ADMIN — DENOSUMAB 60 MG: 60 INJECTION SUBCUTANEOUS at 15:30

## 2024-08-27 ASSESSMENT — FIBROSIS 4 INDEX: FIB4 SCORE: 1.28

## 2024-08-27 NOTE — PROGRESS NOTES
Tamiko arrives ambulatory to [facility] for Prolia injection. Labs drawn by venupuncture to right AC. Site covered with gauze and coban. Results reviewed and Prolia injected into back left arm. Message sent to schedulers for next appointment.

## 2024-12-05 DIAGNOSIS — E55.9 VITAMIN D DEFICIENCY: ICD-10-CM

## 2024-12-05 DIAGNOSIS — R73.03 PREDIABETES: ICD-10-CM

## 2024-12-05 DIAGNOSIS — E78.00 ELEVATED LDL CHOLESTEROL LEVEL: ICD-10-CM

## 2024-12-05 DIAGNOSIS — I10 PRIMARY HYPERTENSION: ICD-10-CM

## 2024-12-05 DIAGNOSIS — E03.9 ACQUIRED HYPOTHYROIDISM: ICD-10-CM

## 2024-12-05 DIAGNOSIS — Z00.00 HEALTHCARE MAINTENANCE: ICD-10-CM

## 2024-12-16 ENCOUNTER — HOSPITAL ENCOUNTER (OUTPATIENT)
Facility: MEDICAL CENTER | Age: 79
End: 2024-12-16
Payer: MEDICARE

## 2024-12-16 DIAGNOSIS — E03.9 ACQUIRED HYPOTHYROIDISM: ICD-10-CM

## 2024-12-16 DIAGNOSIS — E55.9 VITAMIN D DEFICIENCY: ICD-10-CM

## 2024-12-16 DIAGNOSIS — E78.00 ELEVATED LDL CHOLESTEROL LEVEL: ICD-10-CM

## 2024-12-16 DIAGNOSIS — Z00.00 HEALTHCARE MAINTENANCE: ICD-10-CM

## 2024-12-16 DIAGNOSIS — I10 PRIMARY HYPERTENSION: ICD-10-CM

## 2024-12-16 DIAGNOSIS — D64.9 ANEMIA, UNSPECIFIED TYPE: ICD-10-CM

## 2024-12-16 DIAGNOSIS — R73.03 PREDIABETES: ICD-10-CM

## 2024-12-16 LAB
25(OH)D3 SERPL-MCNC: 74 NG/ML (ref 30–100)
ALBUMIN SERPL BCP-MCNC: 4 G/DL (ref 3.2–4.9)
ALBUMIN/GLOB SERPL: 1.3 G/DL
ALP SERPL-CCNC: 44 U/L (ref 30–99)
ALT SERPL-CCNC: 12 U/L (ref 2–50)
ANION GAP SERPL CALC-SCNC: 10 MMOL/L (ref 7–16)
AST SERPL-CCNC: 24 U/L (ref 12–45)
BASOPHILS # BLD AUTO: 0.5 % (ref 0–1.8)
BASOPHILS # BLD: 0.03 K/UL (ref 0–0.12)
BILIRUB SERPL-MCNC: 0.3 MG/DL (ref 0.1–1.5)
BUN SERPL-MCNC: 21 MG/DL (ref 8–22)
CALCIUM ALBUM COR SERPL-MCNC: 9.2 MG/DL (ref 8.5–10.5)
CALCIUM SERPL-MCNC: 9.2 MG/DL (ref 8.5–10.5)
CHLORIDE SERPL-SCNC: 106 MMOL/L (ref 96–112)
CHOLEST SERPL-MCNC: 207 MG/DL (ref 100–199)
CO2 SERPL-SCNC: 23 MMOL/L (ref 20–33)
CREAT SERPL-MCNC: 1.01 MG/DL (ref 0.5–1.4)
EOSINOPHIL # BLD AUTO: 0.15 K/UL (ref 0–0.51)
EOSINOPHIL NFR BLD: 2.5 % (ref 0–6.9)
ERYTHROCYTE [DISTWIDTH] IN BLOOD BY AUTOMATED COUNT: 47.4 FL (ref 35.9–50)
EST. AVERAGE GLUCOSE BLD GHB EST-MCNC: 111 MG/DL
FASTING STATUS PATIENT QL REPORTED: NORMAL
GFR SERPLBLD CREATININE-BSD FMLA CKD-EPI: 56 ML/MIN/1.73 M 2
GLOBULIN SER CALC-MCNC: 3.1 G/DL (ref 1.9–3.5)
GLUCOSE SERPL-MCNC: 90 MG/DL (ref 65–99)
HBA1C MFR BLD: 5.5 % (ref 4–5.6)
HCT VFR BLD AUTO: 38.7 % (ref 37–47)
HDLC SERPL-MCNC: 66 MG/DL
HGB BLD-MCNC: 12.5 G/DL (ref 12–16)
IMM GRANULOCYTES # BLD AUTO: 0.01 K/UL (ref 0–0.11)
IMM GRANULOCYTES NFR BLD AUTO: 0.2 % (ref 0–0.9)
LDLC SERPL CALC-MCNC: 121 MG/DL
LYMPHOCYTES # BLD AUTO: 2.33 K/UL (ref 1–4.8)
LYMPHOCYTES NFR BLD: 38.2 % (ref 22–41)
MCH RBC QN AUTO: 31.3 PG (ref 27–33)
MCHC RBC AUTO-ENTMCNC: 32.3 G/DL (ref 32.2–35.5)
MCV RBC AUTO: 97 FL (ref 81.4–97.8)
MONOCYTES # BLD AUTO: 0.37 K/UL (ref 0–0.85)
MONOCYTES NFR BLD AUTO: 6.1 % (ref 0–13.4)
NEUTROPHILS # BLD AUTO: 3.21 K/UL (ref 1.82–7.42)
NEUTROPHILS NFR BLD: 52.5 % (ref 44–72)
NRBC # BLD AUTO: 0 K/UL
NRBC BLD-RTO: 0 /100 WBC (ref 0–0.2)
PLATELET # BLD AUTO: 267 K/UL (ref 164–446)
PMV BLD AUTO: 9.1 FL (ref 9–12.9)
POTASSIUM SERPL-SCNC: 4.9 MMOL/L (ref 3.6–5.5)
PROT SERPL-MCNC: 7.1 G/DL (ref 6–8.2)
RBC # BLD AUTO: 3.99 M/UL (ref 4.2–5.4)
SODIUM SERPL-SCNC: 139 MMOL/L (ref 135–145)
TRIGL SERPL-MCNC: 98 MG/DL (ref 0–149)
WBC # BLD AUTO: 6.1 K/UL (ref 4.8–10.8)

## 2024-12-16 PROCEDURE — 83036 HEMOGLOBIN GLYCOSYLATED A1C: CPT | Mod: GA

## 2024-12-16 PROCEDURE — 85025 COMPLETE CBC W/AUTO DIFF WBC: CPT

## 2024-12-16 PROCEDURE — 80061 LIPID PANEL: CPT

## 2024-12-16 PROCEDURE — 82306 VITAMIN D 25 HYDROXY: CPT

## 2024-12-16 PROCEDURE — 80053 COMPREHEN METABOLIC PANEL: CPT

## 2024-12-16 PROCEDURE — 36415 COLL VENOUS BLD VENIPUNCTURE: CPT

## 2024-12-18 ENCOUNTER — OFFICE VISIT (OUTPATIENT)
Dept: MEDICAL GROUP | Facility: PHYSICIAN GROUP | Age: 79
End: 2024-12-18
Payer: MEDICARE

## 2024-12-18 VITALS
DIASTOLIC BLOOD PRESSURE: 60 MMHG | WEIGHT: 142 LBS | OXYGEN SATURATION: 97 % | HEIGHT: 64 IN | TEMPERATURE: 99.4 F | HEART RATE: 80 BPM | SYSTOLIC BLOOD PRESSURE: 120 MMHG | BODY MASS INDEX: 24.24 KG/M2

## 2024-12-18 DIAGNOSIS — E03.8 OTHER SPECIFIED HYPOTHYROIDISM: ICD-10-CM

## 2024-12-18 DIAGNOSIS — Z00.00 ENCOUNTER FOR MEDICARE ANNUAL WELLNESS EXAM: ICD-10-CM

## 2024-12-18 DIAGNOSIS — M81.0 OSTEOPOROSIS, POST-MENOPAUSAL: ICD-10-CM

## 2024-12-18 DIAGNOSIS — E78.00 ELEVATED LDL CHOLESTEROL LEVEL: ICD-10-CM

## 2024-12-18 DIAGNOSIS — R73.03 PREDIABETES: ICD-10-CM

## 2024-12-18 DIAGNOSIS — E55.9 VITAMIN D DEFICIENCY: ICD-10-CM

## 2024-12-18 DIAGNOSIS — F41.9 ANXIETY: ICD-10-CM

## 2024-12-18 DIAGNOSIS — E03.9 ACQUIRED HYPOTHYROIDISM: ICD-10-CM

## 2024-12-18 DIAGNOSIS — I10 PRIMARY HYPERTENSION: ICD-10-CM

## 2024-12-18 PROCEDURE — 3074F SYST BP LT 130 MM HG: CPT

## 2024-12-18 PROCEDURE — G0439 PPPS, SUBSEQ VISIT: HCPCS

## 2024-12-18 PROCEDURE — 3078F DIAST BP <80 MM HG: CPT

## 2024-12-18 ASSESSMENT — FIBROSIS 4 INDEX: FIB4 SCORE: 2.1

## 2024-12-18 ASSESSMENT — PATIENT HEALTH QUESTIONNAIRE - PHQ9: CLINICAL INTERPRETATION OF PHQ2 SCORE: 0

## 2024-12-18 ASSESSMENT — ENCOUNTER SYMPTOMS: GENERAL WELL-BEING: EXCELLENT

## 2024-12-18 ASSESSMENT — ACTIVITIES OF DAILY LIVING (ADL): BATHING_REQUIRES_ASSISTANCE: 0

## 2024-12-18 NOTE — PROGRESS NOTES
Chief Complaint   Patient presents with    Annual Wellness Visit       HPI:  ValarieCitlalli Sandoval is a 79 y.o. here for Medicare Annual Wellness Visit     Patient Active Problem List    Diagnosis Date Noted    Psychophysiological insomnia 06/21/2023    Anxiety 06/21/2023    Bilateral hip pain 06/21/2023    Acute bilateral low back pain without sciatica 06/21/2023    Primary hypertension 05/10/2023    Panic disorder without agoraphobia 05/10/2023    History of bilateral hip replacements 02/18/2014    Post hysterectomy menopause 02/18/2014    Osteoporosis, post-menopausal 02/18/2014    Hypothyroid 02/14/2014    Vitamin D deficiency disease 02/14/2014    Osteoarthritis 02/14/2014       Current Outpatient Medications   Medication Sig Dispense Refill    Nirmatrelvir&Ritonavir 300/100 20 x 150 MG & 10 x 100MG Tablet Therapy Pack Take 300 mg nirmatrelvir (two 150 mg tablets) with 100 mg ritonavir (one 100 mg tablet) by mouth, with all three tablets taken together twice daily for 5 days. 30 Each 0    ferrous sulfate 325 (65 Fe) MG tablet Take 1 Tablet by mouth every 48 hours. 30 Tablet 1    levothyroxine (SYNTHROID) 75 MCG Tab Take 1 Tablet by mouth every day. 90 Tablet 3    lisinopril (PRINIVIL) 20 MG Tab Take 1 Tablet by mouth every day. 90 Tablet 3    METAMUCIL FIBER PO Take  by mouth.      Cholecalciferol (D3 2000) 2000 UNIT Cap Take 2,000 Units by mouth every day.      Non Formulary Request Take 1 Tablet by mouth every day. Calcium Citrate, Magnesium, and Zinc      Multiple Vitamins-Minerals (MULTI FOR HER 50+ PO) Take 1 Tablet by mouth every day.      Lutein-Zeaxanthin 25-5 MG Cap Take 1 Tablet by mouth every day.      denosumab (PROLIA) 60 MG/ML Solution Prefilled Syringe injection Inject 60 mg under the skin every 6 months. Last dose was 2/9/2023      TURMERIC PO Take 1,200 mg by mouth.      Coenzyme Q10 200 MG Cap Take 200 mg by mouth every day.      Omega-3 Fatty Acids (FISH OIL) 1000 MG Cap capsule Take 4,000 mg  by mouth every day.       No current facility-administered medications for this visit.          Current supplements as per medication list.     Allergies: Patient has no known allergies.    Current social contact/activities:     She  reports that she has never smoked. She has never used smokeless tobacco. She reports current alcohol use of about 1.5 oz of alcohol per week. She reports that she does not use drugs.  Counseling given: Not Answered      ROS:    Gait: Uses no assistive device  Ostomy: No  Other tubes: No  Amputations: No  Chronic oxygen use: No  Last eye exam: 2024  Wears hearing aids: No   : Denies any urinary leakage during the last 6 months    Screening:    Depression Screening  Little interest or pleasure in doing things?  0 - not at all  Feeling down, depressed , or hopeless? 0 - not at all  Patient Health Questionnaire Score: 0     If depressive symptoms identified deferred to follow up visit unless specifically addressed in assessment and plan.    Interpretation of PHQ-9 Total Score   Score Severity   1-4 No Depression   5-9 Mild Depression   10-14 Moderate Depression   15-19 Moderately Severe Depression   20-27 Severe Depression    Screening for Cognitive Impairment  Do you or any of your friends or family members have any concern about your memory? No  Three Minute Recall (Leader, Season, Table) 3/3    Chuckie clock face with all 12 numbers and set the hands to show 10 minutes after 11.  Yes    Cognitive concerns identified deferred for follow up unless specifically addressed in assessment and plan.    Fall Risk Assessment  Has the patient had two or more falls in the last year or any fall with injury in the last year?  No    Safety Assessment  Do you always wear your seatbelt?  Yes  Any changes to home needed to function safely? No  Difficulty hearing.  No  Patient counseled about all safety risks that were identified.    Functional Assessment ADLs  Are there any barriers preventing you from  cooking for yourself or meeting nutritional needs?  No.    Are there any barriers preventing you from driving safely or obtaining transportation?  No.    Are there any barriers preventing you from using a telephone or calling for help?  No    Are there any barriers preventing you from shopping?  No.    Are there any barriers preventing you from taking care of your own finances?  No    Are there any barriers preventing you from managing your medications?  No    Are there any barriers preventing you from showering, bathing or dressing yourself? No    Are there any barriers preventing you from doing housework or laundry? No  Are there any barriers preventing you from using the toilet?No  Are you currently engaging in any exercise or physical activity?  Yes.      Self-Assessment of Health  What is your perception of your health? Excellent    Do you sleep more than six hours a night? No    In the past 7 days, how much did pain keep you from doing your normal work? None    Do you spend quality time with family or friends (virtually or in person)? Yes    Do you usually eat a heart healthy diet that constists of a variety of fruits, vegetables, whole grains and fiber? Yes    Do you eat foods high in fat and/or Fast Food more than three times per week? No    How concerned are you that your medical conditions are not being well managed? Not at all    Are you worried that in the next 2 months, you may not have stable housing that you own, rent, or stay in as part of a household? No      Advance Care Planning  Do you have an Advance Directive, Living Will, Durable Power of , or POLST? No                 Health Maintenance Summary            Overdue - IMM DTaP/Tdap/Td Vaccine (1 - Tdap) Never done      No completion history exists for this topic.              Annual Wellness Visit (Yearly) Next due on 12/18/2025 12/18/2024  Visit Dx: Encounter for Medicare annual wellness exam              Bone Density Scan (Every  5 Years) Next due on 6/24/2029 06/24/2024  DS-BONE DENSITY STUDY (DEXA)    06/21/2022  DS-BONE DENSITY STUDY (DEXA)    06/02/2020  DS-BONE DENSITY STUDY (DEXA)    03/20/2018  DS-BONE DENSITY STUDY (DEXA)    03/11/2016  DS-BONE DENSITY STUDY (DEXA)    Only the first 5 history entries have been loaded, but more history exists.              Zoster (Shingles) Vaccines (Series Information) Completed      10/19/2019  Outside Immunization: Zoster Darryl (Shingrix)    05/21/2019  Outside Immunization: Zoster Darryl (Shingrix)    07/10/2008  Imm Admin: Zoster Vaccine Live (ZVL) (Zostavax) - HISTORICAL DATA              Pneumococcal Vaccine: 65+ Years (Series Information) Completed      10/29/2022  Outside Immunization: PCV20    10/09/2018  Outside Immunization: PPSV23    12/08/2014  Outside Immunization: PCV-13 (Prevnar 13)    11/10/2008  Imm Admin: Pneumococcal polysaccharide vaccine (PPSV-23)              Influenza Vaccine (Series Information) Completed      09/20/2024  Outside Immunization: Influenza, High Dose    09/26/2023  Outside Immunization: Fluzone High-Dose Quad    10/29/2022  Outside Immunization: Fluzone High-Dose Quad    10/05/2021  Outside Immunization: Fluzone High-Dose Quad    10/13/2020  Outside Immunization: Influenza Quad Adjuvanted    Only the first 5 history entries have been loaded, but more history exists.              COVID-19 Vaccine (Series Information) Completed      11/21/2024  Imm Admin: COVID-19, mRNA, LNP-S, PF, jeanette-sucrose, 30 mcg/0.3 mL    09/26/2023  Imm Admin: Comirnaty (Covid-19 Vaccine, Mrna, [id number] Formula)    10/07/2022  Imm Admin: PFIZER BIVALENT SARS-COV-2 VACCINE (12+)    10/19/2021  Imm Admin: PFIZER PURPLE CAP SARS-COV-2 VACCINATION (12+)    02/11/2021  Imm Admin: PFIZER PURPLE CAP SARS-COV-2 VACCINATION (12+)    Only the first 5 history entries have been loaded, but more history exists.              Hepatitis A Vaccine (Hep A) (Series Information) Aged Out      No completion  history exists for this topic.              Hepatitis B Vaccine (Hep B) (Series Information) Aged Out      No completion history exists for this topic.              HPV Vaccines (Series Information) Aged Out      No completion history exists for this topic.              Polio Vaccine (Inactivated Polio) (Series Information) Aged Out      No completion history exists for this topic.              Meningococcal Immunization (Series Information) Aged Out      No completion history exists for this topic.              Discontinued - Mammogram  Discontinued        Frequency changed to Never automatically (Topic No Longer Applies)    04/02/2024  MA-SCREENING MAMMO BILAT W/TOMOSYNTHESIS W/CAD    02/07/2023  MA-SCREENING MAMMO BILAT W/TOMOSYNTHESIS W/CAD    01/10/2022  MA-SCREENING MAMMO BILAT W/TOMOSYNTHESIS W/CAD    10/21/2019  MA-SCREENING MAMMO BILAT W/TOMOSYNTHESIS W/CAD    Only the first 5 history entries have been loaded, but more history exists.              Discontinued - Colorectal Cancer Screening  Discontinued        Frequency changed to Never automatically (Topic No Longer Applies)    10/11/2017  Occult Blood Feces component of OCCULT BLOOD STOOL    02/09/2016  OCCULT BLOOD FECES IMMUNOASSAY              Discontinued - Hepatitis C Screening  Discontinued        Frequency changed to Never automatically (Topic No Longer Applies)    03/20/2023  Hepatitis C Antibody component of HEP C VIRUS ANTIBODY    10/10/2017  Hepatitis C Antibody component of HEP C VIRUS ANTIBODY                    Patient Care Team:  GRECIA JacksonNAdrianneP. as PCP - General (Nurse Practitioner Family)  Kacey Leonard M.D. as Consulting Physician (Rheumatology)        Social History     Tobacco Use    Smoking status: Never    Smokeless tobacco: Never   Vaping Use    Vaping status: Never Used   Substance Use Topics    Alcohol use: Yes     Alcohol/week: 1.5 oz     Types: 3 Standard drinks or equivalent per week     Comment: rare wine    Drug  "use: No     Family History   Problem Relation Age of Onset    Lung Disease Mother     Heart Disease Mother     Heart Disease Father     Heart Disease Paternal Aunt     Cancer Paternal Aunt     Hypertension Other     Stroke Other      She  has a past medical history of Arthritis, Osteoporosis, Pain (06/11/2012), Shingles (01/01/2010), and Unspecified disorder of thyroid.   Past Surgical History:   Procedure Laterality Date    HIP ARTHROPLASTY TOTAL  12/10/2012    Performed by Gio Lyons M.D. at SURGERY [facility]    HIP ARTHROPLASTY TOTAL  6/25/2012    right; Performed by GIO LYONS at SURGERY [facility]    COLONOSCOPY  2004    HYSTERECTOMY, TOTAL ABDOMINAL  1977    OTHER ORTHOPEDIC SURGERY         Exam:   /60 (BP Location: Left arm, Patient Position: Sitting, BP Cuff Size: Adult)   Pulse 80   Temp 37.4 °C (99.4 °F) (Temporal)   Ht 1.626 m (5' 4\")   Wt 64.4 kg (142 lb)   SpO2 97%  Body mass index is 24.37 kg/m².    Hearing good.    Dentition good  Alert, oriented in no acute distress.  Eye contact is good, speech goal directed, affect calm    Assessment and Plan. The following treatment and monitoring plan is recommended:    1. Encounter for Medicare annual wellness exam  - CBC WITH DIFFERENTIAL; Future  - Comp Metabolic Panel; Future  - Lipid Profile; Future  - HEMOGLOBIN A1C; Future  - VITAMIN D,25 HYDROXY (DEFICIENCY); Future  - TSH+FREE T4    2. Vitamin D deficiency disease  - VITAMIN D,25 HYDROXY (DEFICIENCY); Future    3. Primary hypertension  - CBC WITH DIFFERENTIAL; Future  - Comp Metabolic Panel; Future    4. Other specified hypothyroidism    5. Prediabetes  - Comp Metabolic Panel; Future  - HEMOGLOBIN A1C; Future    6. Acquired hypothyroidism  - TSH+FREE T4    7. Elevated LDL cholesterol level  - Comp Metabolic Panel; Future  - Lipid Profile; Future    Problem List Items Addressed This Visit          Family Medicine Problems    Vitamin D deficiency disease    Relevant " Orders    VITAMIN D,25 HYDROXY (DEFICIENCY)    Primary hypertension     Chronic stable condition with blood pressure today in clinic 120/60 therefore recommend continuation of lisinopril 20 mg daily.         Relevant Orders    CBC WITH DIFFERENTIAL    Comp Metabolic Panel       Other    Hypothyroid     Chronic stable condition for which she is biochemically stable therefore we will continue current dosage of levothyroxine 75 mcg daily.         Relevant Orders    TSH+FREE T4    Osteoporosis, post-menopausal     Chronic stable condition for which patient receives Prolia injections twice yearly.  Doing well without recent fracture.  She will continue on density screening as well as vitamin D3, calcium and Prolia infusions.  Weightbearing exercise also recommended.         Anxiety     Chronic stable condition          Other Visit Diagnoses       Encounter for Medicare annual wellness exam        Relevant Orders    CBC WITH DIFFERENTIAL    Comp Metabolic Panel    Lipid Profile    HEMOGLOBIN A1C    VITAMIN D,25 HYDROXY (DEFICIENCY)    TSH+FREE T4    Prediabetes        Relevant Orders    Comp Metabolic Panel    HEMOGLOBIN A1C    Elevated LDL cholesterol level        Relevant Orders    Comp Metabolic Panel    Lipid Profile            Services suggested: No services needed at this time  Health Care Screening: Age-appropriate preventive services recommended by USPTF and ACIP covered by Medicare were discussed today. Services ordered if indicated and agreed upon by the patient.  Referrals offered: Community-based lifestyle interventions to reduce health risks and promote self-management and wellness, fall prevention, nutrition, physical activity, tobacco-use cessation, weight loss, and mental health services as per orders if indicated.    Discussion today about general wellness and lifestyle habits:    Prevent falls and reduce trip hazards; Cautioned about securing or removing rugs.  Have a working fire alarm and carbon  monoxide detector;   Engage in regular physical activity and social activities     Follow-up: Return in about 1 year (around 12/18/2025), or if symptoms worsen or fail to improve.

## 2024-12-19 NOTE — ASSESSMENT & PLAN NOTE
Chronic stable condition for which patient receives Prolia injections twice yearly.  Doing well without recent fracture.  She will continue on density screening as well as vitamin D3, calcium and Prolia infusions.  Weightbearing exercise also recommended.

## 2024-12-19 NOTE — ASSESSMENT & PLAN NOTE
Chronic stable condition for which she is biochemically stable therefore we will continue current dosage of levothyroxine 75 mcg daily.

## 2024-12-19 NOTE — ASSESSMENT & PLAN NOTE
Chronic stable condition with blood pressure today in clinic 120/60 therefore recommend continuation of lisinopril 20 mg daily.

## 2025-01-09 ENCOUNTER — OFFICE VISIT (OUTPATIENT)
Dept: RHEUMATOLOGY | Facility: MEDICAL CENTER | Age: 80
End: 2025-01-09
Attending: INTERNAL MEDICINE
Payer: MEDICARE

## 2025-01-09 VITALS
SYSTOLIC BLOOD PRESSURE: 140 MMHG | OXYGEN SATURATION: 98 % | TEMPERATURE: 96.7 F | WEIGHT: 145 LBS | DIASTOLIC BLOOD PRESSURE: 62 MMHG | BODY MASS INDEX: 24.89 KG/M2 | HEART RATE: 71 BPM | RESPIRATION RATE: 14 BRPM

## 2025-01-09 DIAGNOSIS — I10 ESSENTIAL HYPERTENSION: ICD-10-CM

## 2025-01-09 DIAGNOSIS — M15.0 PRIMARY OSTEOARTHRITIS INVOLVING MULTIPLE JOINTS: ICD-10-CM

## 2025-01-09 DIAGNOSIS — Z79.899 ENCOUNTER FOR MONITORING DENOSUMAB THERAPY: ICD-10-CM

## 2025-01-09 DIAGNOSIS — M81.0 OSTEOPOROSIS, POST-MENOPAUSAL: ICD-10-CM

## 2025-01-09 DIAGNOSIS — E03.9 HYPOTHYROIDISM, UNSPECIFIED TYPE: ICD-10-CM

## 2025-01-09 DIAGNOSIS — Z51.81 ENCOUNTER FOR MONITORING DENOSUMAB THERAPY: ICD-10-CM

## 2025-01-09 PROCEDURE — 3077F SYST BP >= 140 MM HG: CPT | Performed by: INTERNAL MEDICINE

## 2025-01-09 PROCEDURE — 3078F DIAST BP <80 MM HG: CPT | Performed by: INTERNAL MEDICINE

## 2025-01-09 PROCEDURE — 99213 OFFICE O/P EST LOW 20 MIN: CPT | Performed by: INTERNAL MEDICINE

## 2025-01-09 PROCEDURE — 99212 OFFICE O/P EST SF 10 MIN: CPT | Performed by: INTERNAL MEDICINE

## 2025-01-09 ASSESSMENT — FIBROSIS 4 INDEX: FIB4 SCORE: 2.1

## 2025-01-09 ASSESSMENT — PATIENT HEALTH QUESTIONNAIRE - PHQ9: CLINICAL INTERPRETATION OF PHQ2 SCORE: 0

## 2025-01-09 NOTE — PROGRESS NOTES
Chief Complaint- joint pain     Subjective:   ValarieCitlalli Sandoval is a 79 y.o. female here today for follow up of rheumatological issues    This is a follow-up visit for this patient who we see in this clinic for osteoporosis.  Patient continues to be on Prolia 60 mg subcu every 6 months.  Patient denies any fractures since last visit, states she is not have any problems with Prolia injections. Patient denies any side effects from the medication, denies any unexplained weight loss, denies any fevers of unknown etiology, denies any GI upset, denies any rashes, denies any new joint swelling, denies recurrent infections.        Bilateral CHLOE by Dr. [facility] 2016     S/p fosamax [id number]  S/p actonel until 2010     DEXA 1/2014 T scores -1.5, -3.0  DEXA 1/2016 T scores -2.0, -1.9     DEXA 3/20/2018 T scores -2.2, -2.0    DEXA 6/2/2020 T scores -2.5, -1.8  FRAX 6/2/2020 not done   DEXA 6/21/2022 T scores -2.8, -2.5  FRAX 6/21/2022 not done  DEXA 6/24/2024 T scores -2.5, -1.8  FRAX 6/24/2024 not done     On Prolia since 6/2022     Current Outpatient Medications   Medication Sig Dispense Refill    Nirmatrelvir&Ritonavir 300/100 20 x 150 MG & 10 x 100MG Tablet Therapy Pack Take 300 mg nirmatrelvir (two 150 mg tablets) with 100 mg ritonavir (one 100 mg tablet) by mouth, with all three tablets taken together twice daily for 5 days. 30 Each 0    ferrous sulfate 325 (65 Fe) MG tablet Take 1 Tablet by mouth every 48 hours. 30 Tablet 1    levothyroxine (SYNTHROID) 75 MCG Tab Take 1 Tablet by mouth every day. 90 Tablet 3    lisinopril (PRINIVIL) 20 MG Tab Take 1 Tablet by mouth every day. 90 Tablet 3    METAMUCIL FIBER PO Take  by mouth.      Cholecalciferol (D3 2000) 2000 UNIT Cap Take 2,000 Units by mouth every day.      Non Formulary Request Take 1 Tablet by mouth every day. Calcium Citrate, Magnesium, and Zinc      Multiple Vitamins-Minerals (MULTI FOR HER 50+ PO) Take 1 Tablet by mouth every day.       Lutein-Zeaxanthin 25-5 MG Cap Take 1 Tablet by mouth every day.      denosumab (PROLIA) 60 MG/ML Solution Prefilled Syringe injection Inject 60 mg under the skin every 6 months. Last dose was 2/9/2023      TURMERIC PO Take 1,200 mg by mouth.      Coenzyme Q10 200 MG Cap Take 200 mg by mouth every day.      Omega-3 Fatty Acids (FISH OIL) 1000 MG Cap capsule Take 4,000 mg by mouth every day.       No current facility-administered medications for this visit.     She  has a past medical history of Arthritis, Osteoporosis, Pain (06/11/2012), Shingles (01/01/2010), and Unspecified disorder of thyroid.    ROS   Other than what is mentioned in HPI or physical exam, there is no history of headaches, double vision or blurred vision.  No trouble swallowing difficulties .  No chest complaints including chest pain, cough or sputum production. No GI complaints including nausea, vomiting, change in bowel habits, or past peptic ulcer disease. No history of blood in the stools. No urinary complaints including dysuria or frequency. No history of alopecia, photosensitivity     Objective:     BP (!) 140/62   Pulse 71   Temp 35.9 °C (96.7 °F) (Temporal)   Resp 14   Wt 65.8 kg (145 lb)   SpO2 98%  Body mass index is 24.89 kg/m².   Physical Exam:    Constitutional: Alert and oriented X3, patient is talkative with good eye contact.Skin: Warm, dry, good turgor, no rashes in visible areas.Eye: Equal, round and reactive, conjunctiva clear, lids normal EOM intactENMT: Lips without lesions,  pinna without deformityNeck: Trachea midline, no masses, no thyromegaly.Lymph:  No cervical lymphadenopathy, no axillary lymphadenopathy, no supraclavicular lymphadenopathyRespiratory: Unlabored respiratory effort, lungs clear to auscultation, no wheezes, no ronchi.Cardiovascular: Normal S1, S2, Regular rate and rhythm, no murmurs rubs or gallops  .Abdomen: Soft, non-distended.Psych: Alert and oriented x3, normal affect and mood.Neuro: Cranial nerves  2-12 are grossly intact Ext:no joint laxity noted in bilateral arms, no joint laxity noted in bilateral legs    Lab Results   Component Value Date/Time    HEPCAB Non-Reactive 03/20/2023 09:24 AM     Lab Results   Component Value Date/Time    SODIUM 139 12/16/2024 09:38 AM    POTASSIUM 4.9 12/16/2024 09:38 AM    CHLORIDE 106 12/16/2024 09:38 AM    CO2 23 12/16/2024 09:38 AM    GLUCOSE 90 12/16/2024 09:38 AM    BUN 21 12/16/2024 09:38 AM    CREATININE 1.01 12/16/2024 09:38 AM      Lab Results   Component Value Date/Time    WBC 6.1 12/16/2024 09:38 AM    RBC 3.99 (L) 12/16/2024 09:38 AM    HEMOGLOBIN 12.5 12/16/2024 09:38 AM    HEMATOCRIT 38.7 12/16/2024 09:38 AM    MCV 97.0 12/16/2024 09:38 AM    MCH 31.3 12/16/2024 09:38 AM    MCHC 32.3 12/16/2024 09:38 AM    MPV 9.1 12/16/2024 09:38 AM    NEUTSPOLYS 52.50 12/16/2024 09:38 AM    LYMPHOCYTES 38.20 12/16/2024 09:38 AM    MONOCYTES 6.10 12/16/2024 09:38 AM    EOSINOPHILS 2.50 12/16/2024 09:38 AM    BASOPHILS 0.50 12/16/2024 09:38 AM      Lab Results   Component Value Date/Time    CALCIUM 9.2 12/16/2024 09:38 AM    ASTSGOT 24 12/16/2024 09:38 AM    ALTSGPT 12 12/16/2024 09:38 AM    ALKPHOSPHAT 44 12/16/2024 09:38 AM    TBILIRUBIN 0.3 12/16/2024 09:38 AM    ALBUMIN 4.0 12/16/2024 09:38 AM    TOTPROTEIN 7.1 12/16/2024 09:38 AM     Lab Results   Component Value Date/Time    SEDRATEWES 16 09/07/2018 02:39 PM     Lab Results   Component Value Date/Time    HBA1C 5.5 12/16/2024 09:38 AM     Lab Results   Component Value Date/Time    PTHINTACT 51.4 07/16/2020 11:12 AM     Assessment and Plan:     1. Osteoporosis, post-menopausal  Last DEXA June 2024, next DEXA June 2026, currently on Prolia 60 mg subcu every 6 months   continue calcium citrate 1200 mg by mouth daily and vitamin D about 2000 units by mouth daily and magnesium 200 mg by mouth daily    2. Encounter for monitoring denosumab therapy  Currently on Prolia 60 mg subcu every 6 months     3. Primary osteoarthritis  involving multiple joints  Patient takes Tylenol or NSAIDs as needed    4. Hypothyroidism, unspecified type  Followed by patients PCP, can exacerbate joint pain, I recommend monitoring thyroid on a regular basis to assure it is not contributing to the patient's joint pain    5. Essential hypertension  May impact the type of medications we can use for this patient's arthritis. We will have to keep this under advisement.    Followup: Return in about 6 months (around 7/9/2025). or sooner prn      Please note that this dictation was created using voice recognition software. I have made every reasonable attempt to correct obvious errors, but I expect that there are errors of grammar and possibly content that I did not discover before finalizing the note.

## 2025-02-10 ENCOUNTER — APPOINTMENT (OUTPATIENT)
Dept: URBAN - METROPOLITAN AREA CLINIC 6 | Facility: CLINIC | Age: 80
Setting detail: DERMATOLOGY
End: 2025-02-10

## 2025-02-10 DIAGNOSIS — Z85.828 PERSONAL HISTORY OF OTHER MALIGNANT NEOPLASM OF SKIN: ICD-10-CM | Status: STABLE

## 2025-02-10 DIAGNOSIS — D18.0 HEMANGIOMA: ICD-10-CM

## 2025-02-10 DIAGNOSIS — L57.0 ACTINIC KERATOSIS: ICD-10-CM

## 2025-02-10 DIAGNOSIS — D22 MELANOCYTIC NEVI: ICD-10-CM

## 2025-02-10 DIAGNOSIS — L60.8 OTHER NAIL DISORDERS: ICD-10-CM

## 2025-02-10 DIAGNOSIS — L82.1 OTHER SEBORRHEIC KERATOSIS: ICD-10-CM

## 2025-02-10 DIAGNOSIS — L81.4 OTHER MELANIN HYPERPIGMENTATION: ICD-10-CM

## 2025-02-10 PROBLEM — D22.5 MELANOCYTIC NEVI OF TRUNK: Status: ACTIVE | Noted: 2025-02-10

## 2025-02-10 PROBLEM — D18.01 HEMANGIOMA OF SKIN AND SUBCUTANEOUS TISSUE: Status: ACTIVE | Noted: 2025-02-10

## 2025-02-10 PROCEDURE — 17000 DESTRUCT PREMALG LESION: CPT

## 2025-02-10 PROCEDURE — ? LIQUID NITROGEN

## 2025-02-10 PROCEDURE — ? COUNSELING

## 2025-02-10 PROCEDURE — 17003 DESTRUCT PREMALG LES 2-14: CPT

## 2025-02-10 PROCEDURE — 99213 OFFICE O/P EST LOW 20 MIN: CPT | Mod: 25

## 2025-02-10 ASSESSMENT — LOCATION DETAILED DESCRIPTION DERM
LOCATION DETAILED: RIGHT THUMBNAIL
LOCATION DETAILED: LEFT SUPERIOR CENTRAL MALAR CHEEK
LOCATION DETAILED: LOWER STERNUM
LOCATION DETAILED: UPPER STERNUM
LOCATION DETAILED: LEFT NASAL DORSUM
LOCATION DETAILED: RIGHT INFERIOR FOREHEAD
LOCATION DETAILED: LEFT MEDIAL CANTHUS
LOCATION DETAILED: RIGHT THUMBNAIL
LOCATION DETAILED: EPIGASTRIC SKIN
LOCATION DETAILED: LEFT ELBOW
LOCATION DETAILED: LEFT PROXIMAL DORSAL FOREARM
LOCATION DETAILED: INFERIOR THORACIC SPINE
LOCATION DETAILED: RIGHT INFERIOR MEDIAL MALAR CHEEK
LOCATION DETAILED: PERIUMBILICAL SKIN
LOCATION DETAILED: RIGHT ANTERIOR SHOULDER
LOCATION DETAILED: NASAL DORSUM

## 2025-02-10 ASSESSMENT — LOCATION SIMPLE DESCRIPTION DERM
LOCATION SIMPLE: RIGHT THUMBNAIL
LOCATION SIMPLE: RIGHT FOREHEAD
LOCATION SIMPLE: LEFT FOREARM
LOCATION SIMPLE: NOSE
LOCATION SIMPLE: RIGHT THUMBNAIL
LOCATION SIMPLE: ABDOMEN
LOCATION SIMPLE: LEFT EYELID
LOCATION SIMPLE: RIGHT SHOULDER
LOCATION SIMPLE: RIGHT CHEEK
LOCATION SIMPLE: CHEST
LOCATION SIMPLE: UPPER BACK
LOCATION SIMPLE: LEFT ELBOW
LOCATION SIMPLE: LEFT CHEEK

## 2025-02-10 ASSESSMENT — LOCATION ZONE DERM
LOCATION ZONE: TRUNK
LOCATION ZONE: FINGERNAIL
LOCATION ZONE: FACE
LOCATION ZONE: ARM
LOCATION ZONE: FINGERNAIL
LOCATION ZONE: EYELID
LOCATION ZONE: NOSE

## 2025-02-10 NOTE — HPI: FULL BODY SKIN EXAMINATION
SuPrep instructions have been created for patient and sent via [facility] portal.   
How Severe Are Your Spot(S)?: mild
What Type Of Note Output Would You Prefer (Optional)?: Standard Output
What Is The Reason For Today's Visit?: Full Body Skin Examination
What Is The Reason For Today's Visit? (Being Monitored For X): surveillance against the recurrence of atypical nevi

## 2025-02-10 NOTE — PROCEDURE: LIQUID NITROGEN
Post-Care Instructions: I reviewed with the patient in detail post-care instructions. Patient is to wear sunprotection, and avoid picking at any of the treated lesions. Pt may apply Vaseline to crusted or scabbing areas.
Consent: The patient's verbal consent was obtained including but not limited to risks of crusting, scabbing, blistering, scarring, darker or lighter pigmentary change, recurrence, incomplete removal and infection.
Render Post-Care Instructions In Note?: no
Detail Level: Detailed
Duration Of Freeze Thaw-Cycle (Seconds): 10
Number Of Freeze-Thaw Cycles: 2 freeze-thaw cycles
Show Applicator Variable?: Yes

## 2025-02-28 ENCOUNTER — OUTPATIENT INFUSION SERVICES (OUTPATIENT)
Dept: ONCOLOGY | Facility: MEDICAL CENTER | Age: 80
End: 2025-02-28
Payer: MEDICARE

## 2025-02-28 VITALS
RESPIRATION RATE: 18 BRPM | OXYGEN SATURATION: 97 % | BODY MASS INDEX: 24.65 KG/M2 | TEMPERATURE: 97.3 F | SYSTOLIC BLOOD PRESSURE: 130 MMHG | HEART RATE: 63 BPM | HEIGHT: 64 IN | WEIGHT: 144.4 LBS | DIASTOLIC BLOOD PRESSURE: 69 MMHG

## 2025-02-28 DIAGNOSIS — M81.0 OSTEOPOROSIS, POST-MENOPAUSAL: ICD-10-CM

## 2025-02-28 LAB
CA-I BLD ISE-SCNC: 1.24 MMOL/L (ref 1.1–1.3)
CREAT BLD-MCNC: 1.2 MG/DL (ref 0.5–1.4)

## 2025-02-28 PROCEDURE — 96372 THER/PROPH/DIAG INJ SC/IM: CPT

## 2025-02-28 PROCEDURE — 700111 HCHG RX REV CODE 636 W/ 250 OVERRIDE (IP): Mod: JZ,TB

## 2025-02-28 PROCEDURE — 36415 COLL VENOUS BLD VENIPUNCTURE: CPT

## 2025-02-28 RX ORDER — METHYLPREDNISOLONE SODIUM SUCCINATE 125 MG/2ML
125 INJECTION INTRAMUSCULAR; INTRAVENOUS PRN
OUTPATIENT
Start: 2025-08-22

## 2025-02-28 RX ORDER — EPINEPHRINE 1 MG/ML(1)
0.5 AMPUL (ML) INJECTION PRN
OUTPATIENT
Start: 2025-08-22

## 2025-02-28 RX ORDER — DIPHENHYDRAMINE HYDROCHLORIDE 50 MG/ML
50 INJECTION INTRAMUSCULAR; INTRAVENOUS PRN
OUTPATIENT
Start: 2025-08-22

## 2025-02-28 RX ADMIN — DENOSUMAB 60 MG: 60 INJECTION SUBCUTANEOUS at 15:15

## 2025-02-28 ASSESSMENT — FIBROSIS 4 INDEX: FIB4 SCORE: 2.1

## 2025-02-28 NOTE — PROGRESS NOTES
Pt arrives to [facility] for Prolia.  Pt denies any s/sx of infection or recent/planned dental procedures.  ISTAT calcium and creatinine drawn via 23g butterfly needle to L-AC.  Labs reviewed and ok to give Prolia per pharmacy.  Prolia given SC to back of [facility].  Email sent to scheduling dept to set up next appt in 6 months.  Pt dc home to self care.

## 2025-03-17 DIAGNOSIS — I10 PRIMARY HYPERTENSION: ICD-10-CM

## 2025-03-17 RX ORDER — LISINOPRIL 20 MG/1
20 TABLET ORAL DAILY
Qty: 90 TABLET | Refills: 0 | Status: SHIPPED | OUTPATIENT
Start: 2025-03-17

## 2025-03-17 RX ORDER — LEVOTHYROXINE SODIUM 75 UG/1
75 TABLET ORAL DAILY
Qty: 90 TABLET | Refills: 0 | Status: SHIPPED | OUTPATIENT
Start: 2025-03-17

## 2025-03-17 NOTE — TELEPHONE ENCOUNTER
Received request via: Pharmacy    Was the patient seen in the last year in this department? Yes    Does the patient have an active prescription (recently filled or refills available) for medication(s) requested? No    [facility]    Does the patient have detention Plus and need 100-day supply? (This applies to ALL medications) Patient does not have SCP

## 2025-04-10 ENCOUNTER — RESULTS FOLLOW-UP (OUTPATIENT)
Dept: MEDICAL GROUP | Facility: PHYSICIAN GROUP | Age: 80
End: 2025-04-10

## 2025-04-10 ENCOUNTER — HOSPITAL ENCOUNTER (OUTPATIENT)
Dept: RADIOLOGY | Facility: MEDICAL CENTER | Age: 80
End: 2025-04-10
Payer: MEDICARE

## 2025-04-10 DIAGNOSIS — Z12.31 VISIT FOR SCREENING MAMMOGRAM: ICD-10-CM

## 2025-04-10 PROCEDURE — 77067 SCR MAMMO BI INCL CAD: CPT

## 2025-05-27 ENCOUNTER — PATIENT MESSAGE (OUTPATIENT)
Dept: MEDICAL GROUP | Facility: PHYSICIAN GROUP | Age: 80
End: 2025-05-27
Payer: COMMERCIAL

## 2025-05-27 DIAGNOSIS — I10 PRIMARY HYPERTENSION: ICD-10-CM

## 2025-05-27 RX ORDER — LEVOTHYROXINE SODIUM 75 UG/1
75 TABLET ORAL DAILY
Qty: 90 TABLET | Refills: 2 | Status: SHIPPED | OUTPATIENT
Start: 2025-05-27

## 2025-05-27 RX ORDER — LISINOPRIL 20 MG/1
20 TABLET ORAL DAILY
Qty: 90 TABLET | Refills: 2 | Status: SHIPPED | OUTPATIENT
Start: 2025-05-27

## 2025-06-06 ENCOUNTER — RESULTS FOLLOW-UP (OUTPATIENT)
Dept: MEDICAL GROUP | Facility: PHYSICIAN GROUP | Age: 80
End: 2025-06-06

## 2025-06-06 ENCOUNTER — OFFICE VISIT (OUTPATIENT)
Dept: MEDICAL GROUP | Facility: PHYSICIAN GROUP | Age: 80
End: 2025-06-06
Payer: MEDICARE

## 2025-06-06 VITALS
TEMPERATURE: 98.4 F | HEIGHT: 64 IN | OXYGEN SATURATION: 97 % | DIASTOLIC BLOOD PRESSURE: 68 MMHG | WEIGHT: 147.8 LBS | BODY MASS INDEX: 25.23 KG/M2 | HEART RATE: 74 BPM | SYSTOLIC BLOOD PRESSURE: 132 MMHG

## 2025-06-06 DIAGNOSIS — U07.1 COVID-19 VIRUS INFECTION: Primary | ICD-10-CM

## 2025-06-06 LAB
FLUAV RNA SPEC QL NAA+PROBE: NEGATIVE
FLUBV RNA SPEC QL NAA+PROBE: NEGATIVE
RSV RNA SPEC QL NAA+PROBE: NEGATIVE
SARS-COV-2 RNA RESP QL NAA+PROBE: POSITIVE

## 2025-06-06 PROCEDURE — 3078F DIAST BP <80 MM HG: CPT | Performed by: FAMILY MEDICINE

## 2025-06-06 PROCEDURE — 0241U POCT CEPHEID COV-2, FLU A/B, RSV - PCR: CPT | Performed by: FAMILY MEDICINE

## 2025-06-06 PROCEDURE — 3075F SYST BP GE 130 - 139MM HG: CPT | Performed by: FAMILY MEDICINE

## 2025-06-06 PROCEDURE — 99213 OFFICE O/P EST LOW 20 MIN: CPT | Performed by: FAMILY MEDICINE

## 2025-06-06 ASSESSMENT — FIBROSIS 4 INDEX: FIB4 SCORE: 2.1

## 2025-06-06 NOTE — PROGRESS NOTES
"Chief Complaint   Patient presents with    Pharyngitis     Fever, chills, coughing tuesday morning sick tuesday Wednesday Thursday feeling a lot better today. Patient states she might have ear infection        HPI: Patient is a 79 y.o. female complaining of 4 days of illness including: chills, productive cough, fever, rhinorrhea, sore throat, ear fullness.   Mucus is: clear.  Similarly ill exposures: no.  Treatments tried: ibuprofen, fluids  She  reports that she has never smoked. She has never used smokeless tobacco.    Feeling better.     ROS:  No nausea, changes in bowel movements or skin rash.      I reviewed the patient's medications, allergies and medical history:  Current Medications[1]  Patient has no known allergies.  Past Medical History[2]     EXAM:  /68 (BP Location: Left arm, Patient Position: Sitting, BP Cuff Size: Adult)   Pulse 74   Temp 36.9 °C (98.4 °F) (Temporal)   Ht 1.626 m (5' 4\")   Wt 67 kg (147 lb 12.8 oz)   SpO2 97%   General: Alert, no conversational dyspnea or audible wheeze, non-toxic appearance.  Eyes: PERRL, conjunctiva slightly injected, no eye discharge.  Ears: Normal pinnae,TM's normal bilaterally.  Nares: Patent with thin mucus.  Sinuses: non tender over maxillary / frontal sinuses.  Throat: Erythematous injection without exudate.   Neck: Supple, with mildly enlarged cervical nodes.  Lungs: Clear to auscultation bilaterally, no wheeze, crackles or rhonchi.   Heart: Regular rate without murmur.  Skin: Warm and dry without rash.    Results for orders placed or performed in visit on 06/06/25   POCT CEPHEID COV-2, FLU A/B, RSV - PCR    Collection Time: 06/06/25 10:54 AM   Result Value Ref Range    SARS-CoV-2 by PCR Positive (A) Negative, Invalid    Influenza virus A RNA Negative Negative, Invalid    Influenza virus B, PCR Negative Negative, Invalid    RSV, PCR Negative Negative, Invalid        ASSESSMENT:   1. COVID-19 virus infection          PLAN:  1.  COVID-positive in the " office today.  I had offered a prescription for Paxlovid but she wanted to decide after the results were in.  I am awaiting to hear back from her if she wants a Paxlovid prescription.  2. Twice daily use of nasal saline rinse or Neti-Pot.  3. OTC anti-pyretics and decongestants as needed.  4. Follow-up in office or urgent care for worsening symptoms, difficulty breathing, lack of expected recovery, or should new symptoms or problems arise.         [1]   Current Outpatient Medications   Medication Sig Dispense Refill    levothyroxine (SYNTHROID) 75 MCG Tab Take 1 Tablet by mouth every day. 90 Tablet 2    lisinopril (PRINIVIL) 20 MG Tab Take 1 Tablet by mouth every day. 90 Tablet 2    Calcium Carb-Cholecalciferol (CALCIUM 600 + D PO) Take  by mouth.      ferrous sulfate 325 (65 Fe) MG tablet Take 1 Tablet by mouth every 48 hours. 30 Tablet 1    METAMUCIL FIBER PO Take  by mouth.      Cholecalciferol (D3 2000) 2000 UNIT Cap Take 2,000 Units by mouth every day.      Non Formulary Request Take 1 Tablet by mouth every day. Calcium Citrate, Magnesium, and Zinc      Multiple Vitamins-Minerals (MULTI FOR HER 50+ PO) Take 1 Tablet by mouth every day.      Lutein-Zeaxanthin 25-5 MG Cap Take 1 Tablet by mouth every day.      denosumab (PROLIA) 60 MG/ML Solution Prefilled Syringe injection Inject 60 mg under the skin every 6 months. Last dose was 2/9/2023      TURMERIC PO Take 1,200 mg by mouth.      Coenzyme Q10 200 MG Cap Take 200 mg by mouth every day.      Omega-3 Fatty Acids (FISH OIL) 1000 MG Cap capsule Take 4,000 mg by mouth every day.       No current facility-administered medications for this visit.   [2]   Past Medical History:  Diagnosis Date    Arthritis     Osteoporosis     Pain 06/11/2012    left hip    Shingles 01/01/2010    Unspecified disorder of thyroid     hypothyroid

## 2025-08-07 ENCOUNTER — OFFICE VISIT (OUTPATIENT)
Dept: RHEUMATOLOGY | Facility: MEDICAL CENTER | Age: 80
End: 2025-08-07
Attending: STUDENT IN AN ORGANIZED HEALTH CARE EDUCATION/TRAINING PROGRAM
Payer: MEDICARE

## 2025-08-07 VITALS
DIASTOLIC BLOOD PRESSURE: 72 MMHG | BODY MASS INDEX: 24.41 KG/M2 | RESPIRATION RATE: 16 BRPM | HEIGHT: 64 IN | SYSTOLIC BLOOD PRESSURE: 116 MMHG | HEART RATE: 65 BPM | OXYGEN SATURATION: 96 % | WEIGHT: 143 LBS | TEMPERATURE: 96.8 F

## 2025-08-07 DIAGNOSIS — M81.0 OSTEOPOROSIS, POST-MENOPAUSAL: Primary | ICD-10-CM

## 2025-08-07 DIAGNOSIS — M15.0 PRIMARY OSTEOARTHRITIS INVOLVING MULTIPLE JOINTS: ICD-10-CM

## 2025-08-07 DIAGNOSIS — N18.31 STAGE 3A CHRONIC KIDNEY DISEASE: ICD-10-CM

## 2025-08-07 PROCEDURE — 3078F DIAST BP <80 MM HG: CPT | Performed by: STUDENT IN AN ORGANIZED HEALTH CARE EDUCATION/TRAINING PROGRAM

## 2025-08-07 PROCEDURE — 99213 OFFICE O/P EST LOW 20 MIN: CPT | Performed by: STUDENT IN AN ORGANIZED HEALTH CARE EDUCATION/TRAINING PROGRAM

## 2025-08-07 PROCEDURE — 3074F SYST BP LT 130 MM HG: CPT | Performed by: STUDENT IN AN ORGANIZED HEALTH CARE EDUCATION/TRAINING PROGRAM

## 2025-08-07 PROCEDURE — G2212 PROLONG OUTPT/OFFICE VIS: HCPCS | Performed by: STUDENT IN AN ORGANIZED HEALTH CARE EDUCATION/TRAINING PROGRAM

## 2025-08-07 PROCEDURE — 99215 OFFICE O/P EST HI 40 MIN: CPT | Performed by: STUDENT IN AN ORGANIZED HEALTH CARE EDUCATION/TRAINING PROGRAM

## 2025-08-07 ASSESSMENT — FIBROSIS 4 INDEX: FIB4 SCORE: 2.1

## 2025-08-08 RX ORDER — MEPERIDINE HYDROCHLORIDE 25 MG/ML
25 INJECTION INTRAMUSCULAR; INTRAVENOUS; SUBCUTANEOUS PRN
Status: CANCELLED | OUTPATIENT
Start: 2025-08-11

## 2025-08-08 RX ORDER — ONDANSETRON 8 MG/1
8 TABLET, ORALLY DISINTEGRATING ORAL PRN
Status: CANCELLED | OUTPATIENT
Start: 2025-08-11

## 2025-08-08 RX ORDER — EPINEPHRINE 1 MG/ML(1)
0.5 AMPUL (ML) INJECTION PRN
Status: CANCELLED | OUTPATIENT
Start: 2025-08-11

## 2025-08-08 RX ORDER — SODIUM CHLORIDE 9 MG/ML
1000 INJECTION, SOLUTION INTRAVENOUS PRN
Status: CANCELLED | OUTPATIENT
Start: 2025-08-11

## 2025-08-08 RX ORDER — LORAZEPAM 2 MG/ML
0.5 INJECTION INTRAMUSCULAR PRN
Status: CANCELLED | OUTPATIENT
Start: 2025-08-11

## 2025-08-08 RX ORDER — ALBUTEROL SULFATE 5 MG/ML
2.5 SOLUTION RESPIRATORY (INHALATION) PRN
Status: CANCELLED | OUTPATIENT
Start: 2025-08-11

## 2025-08-08 RX ORDER — ONDANSETRON 2 MG/ML
8 INJECTION INTRAMUSCULAR; INTRAVENOUS PRN
Status: CANCELLED | OUTPATIENT
Start: 2025-08-11

## 2025-08-08 RX ORDER — LORAZEPAM 0.5 MG/1
0.5 TABLET ORAL PRN
Status: CANCELLED | OUTPATIENT
Start: 2025-08-11

## 2025-08-08 RX ORDER — ACETAMINOPHEN 325 MG/1
650 TABLET ORAL PRN
Status: CANCELLED | OUTPATIENT
Start: 2025-08-11

## 2025-08-08 RX ORDER — METHYLPREDNISOLONE SODIUM SUCCINATE 125 MG/2ML
125 INJECTION, POWDER, LYOPHILIZED, FOR SOLUTION INTRAMUSCULAR; INTRAVENOUS PRN
Status: CANCELLED | OUTPATIENT
Start: 2025-08-11

## 2025-08-08 RX ORDER — DIPHENHYDRAMINE HYDROCHLORIDE 50 MG/ML
25 INJECTION, SOLUTION INTRAMUSCULAR; INTRAVENOUS PRN
Status: CANCELLED | OUTPATIENT
Start: 2025-08-11

## 2025-08-12 ENCOUNTER — APPOINTMENT (OUTPATIENT)
Dept: URBAN - METROPOLITAN AREA CLINIC 6 | Facility: CLINIC | Age: 80
Setting detail: DERMATOLOGY
End: 2025-08-12

## 2025-08-12 DIAGNOSIS — L57.0 ACTINIC KERATOSIS: ICD-10-CM

## 2025-08-12 DIAGNOSIS — L60.8 OTHER NAIL DISORDERS: ICD-10-CM

## 2025-08-12 DIAGNOSIS — Z85.828 PERSONAL HISTORY OF OTHER MALIGNANT NEOPLASM OF SKIN: ICD-10-CM | Status: STABLE

## 2025-08-12 DIAGNOSIS — L82.1 OTHER SEBORRHEIC KERATOSIS: ICD-10-CM

## 2025-08-12 DIAGNOSIS — D18.0 HEMANGIOMA: ICD-10-CM

## 2025-08-12 DIAGNOSIS — L81.4 OTHER MELANIN HYPERPIGMENTATION: ICD-10-CM

## 2025-08-12 DIAGNOSIS — D22 MELANOCYTIC NEVI: ICD-10-CM

## 2025-08-12 PROBLEM — D18.01 HEMANGIOMA OF SKIN AND SUBCUTANEOUS TISSUE: Status: ACTIVE | Noted: 2025-08-12

## 2025-08-12 PROBLEM — D22.5 MELANOCYTIC NEVI OF TRUNK: Status: ACTIVE | Noted: 2025-08-12

## 2025-08-12 PROCEDURE — ? COUNSELING

## 2025-08-12 PROCEDURE — ? LIQUID NITROGEN

## 2025-08-12 ASSESSMENT — LOCATION DETAILED DESCRIPTION DERM
LOCATION DETAILED: LEFT MEDIAL CANTHUS
LOCATION DETAILED: INFERIOR THORACIC SPINE
LOCATION DETAILED: RIGHT DISTAL DORSAL FOREARM
LOCATION DETAILED: UPPER STERNUM
LOCATION DETAILED: RIGHT INFERIOR FOREHEAD
LOCATION DETAILED: PERIUMBILICAL SKIN
LOCATION DETAILED: EPIGASTRIC SKIN
LOCATION DETAILED: RIGHT CENTRAL MALAR CHEEK
LOCATION DETAILED: RIGHT THUMBNAIL
LOCATION DETAILED: LEFT SCAPHA
LOCATION DETAILED: NASAL SUPRATIP
LOCATION DETAILED: LOWER STERNUM
LOCATION DETAILED: MIDDLE STERNUM
LOCATION DETAILED: RIGHT THUMBNAIL

## 2025-08-12 ASSESSMENT — LOCATION SIMPLE DESCRIPTION DERM
LOCATION SIMPLE: LEFT EAR
LOCATION SIMPLE: LEFT EYELID
LOCATION SIMPLE: RIGHT CHEEK
LOCATION SIMPLE: NOSE
LOCATION SIMPLE: RIGHT FOREHEAD
LOCATION SIMPLE: UPPER BACK
LOCATION SIMPLE: CHEST
LOCATION SIMPLE: RIGHT THUMBNAIL
LOCATION SIMPLE: RIGHT THUMBNAIL
LOCATION SIMPLE: ABDOMEN
LOCATION SIMPLE: RIGHT FOREARM

## 2025-08-12 ASSESSMENT — LOCATION ZONE DERM
LOCATION ZONE: FACE
LOCATION ZONE: FINGERNAIL
LOCATION ZONE: NOSE
LOCATION ZONE: EAR
LOCATION ZONE: TRUNK
LOCATION ZONE: EYELID
LOCATION ZONE: FINGERNAIL
LOCATION ZONE: ARM

## 2025-08-29 ENCOUNTER — OUTPATIENT INFUSION SERVICES (OUTPATIENT)
Dept: ONCOLOGY | Facility: MEDICAL CENTER | Age: 80
End: 2025-08-29
Payer: MEDICARE

## 2025-08-29 VITALS
RESPIRATION RATE: 18 BRPM | SYSTOLIC BLOOD PRESSURE: 160 MMHG | HEART RATE: 69 BPM | BODY MASS INDEX: 24.65 KG/M2 | HEIGHT: 64 IN | TEMPERATURE: 97.5 F | OXYGEN SATURATION: 97 % | DIASTOLIC BLOOD PRESSURE: 86 MMHG | WEIGHT: 144.4 LBS

## 2025-08-29 DIAGNOSIS — M81.0 OSTEOPOROSIS, POST-MENOPAUSAL: Primary | ICD-10-CM

## 2025-08-29 LAB — CA-I BLD ISE-SCNC: 1.21 MMOL/L (ref 1.1–1.3)

## 2025-08-29 PROCEDURE — 96372 THER/PROPH/DIAG INJ SC/IM: CPT

## 2025-08-29 PROCEDURE — 700111 HCHG RX REV CODE 636 W/ 250 OVERRIDE (IP): Mod: JZ,TB | Performed by: STUDENT IN AN ORGANIZED HEALTH CARE EDUCATION/TRAINING PROGRAM

## 2025-08-29 PROCEDURE — 82330 ASSAY OF CALCIUM: CPT

## 2025-08-29 PROCEDURE — 36415 COLL VENOUS BLD VENIPUNCTURE: CPT

## 2025-08-29 RX ORDER — METHYLPREDNISOLONE SODIUM SUCCINATE 125 MG/2ML
125 INJECTION, POWDER, LYOPHILIZED, FOR SOLUTION INTRAMUSCULAR; INTRAVENOUS PRN
OUTPATIENT
Start: 2026-02-25

## 2025-08-29 RX ORDER — LORAZEPAM 1 MG/1
0.5 TABLET ORAL PRN
OUTPATIENT
Start: 2026-02-25

## 2025-08-29 RX ORDER — ONDANSETRON 2 MG/ML
8 INJECTION INTRAMUSCULAR; INTRAVENOUS PRN
OUTPATIENT
Start: 2026-02-25

## 2025-08-29 RX ORDER — ALBUTEROL SULFATE 5 MG/ML
2.5 SOLUTION RESPIRATORY (INHALATION) PRN
OUTPATIENT
Start: 2026-02-25

## 2025-08-29 RX ORDER — ACETAMINOPHEN 325 MG/1
650 TABLET ORAL PRN
OUTPATIENT
Start: 2026-02-25

## 2025-08-29 RX ORDER — MEPERIDINE HYDROCHLORIDE 25 MG/ML
25 INJECTION INTRAMUSCULAR; INTRAVENOUS; SUBCUTANEOUS PRN
OUTPATIENT
Start: 2026-02-25

## 2025-08-29 RX ORDER — EPINEPHRINE 1 MG/ML(1)
0.5 AMPUL (ML) INJECTION PRN
OUTPATIENT
Start: 2026-02-25

## 2025-08-29 RX ORDER — ONDANSETRON 8 MG/1
8 TABLET, ORALLY DISINTEGRATING ORAL PRN
OUTPATIENT
Start: 2026-02-25

## 2025-08-29 RX ORDER — SODIUM CHLORIDE 9 MG/ML
1000 INJECTION, SOLUTION INTRAVENOUS PRN
OUTPATIENT
Start: 2026-02-25

## 2025-08-29 RX ORDER — DIPHENHYDRAMINE HYDROCHLORIDE 50 MG/ML
25 INJECTION, SOLUTION INTRAMUSCULAR; INTRAVENOUS PRN
OUTPATIENT
Start: 2026-02-25

## 2025-08-29 RX ORDER — LORAZEPAM 2 MG/ML
0.5 INJECTION INTRAMUSCULAR PRN
OUTPATIENT
Start: 2026-02-25

## 2025-08-29 RX ADMIN — DENOSUMAB 60 MG: 60 INJECTION SUBCUTANEOUS at 09:25

## 2025-08-29 ASSESSMENT — FIBROSIS 4 INDEX: FIB4 SCORE: 2.1
